# Patient Record
Sex: FEMALE | Race: BLACK OR AFRICAN AMERICAN | Employment: UNEMPLOYED | URBAN - METROPOLITAN AREA
[De-identification: names, ages, dates, MRNs, and addresses within clinical notes are randomized per-mention and may not be internally consistent; named-entity substitution may affect disease eponyms.]

---

## 2017-06-13 ENCOUNTER — HOSPITAL ENCOUNTER (OUTPATIENT)
Dept: MRI IMAGING | Age: 47
Discharge: HOME | End: 2017-06-13
Attending: FAMILY MEDICINE
Payer: MEDICAID

## 2017-06-13 DIAGNOSIS — M79.602 PAIN OF LEFT UPPER EXTREMITY: ICD-10-CM

## 2017-06-13 DIAGNOSIS — M54.2 NECK PAIN: ICD-10-CM

## 2017-06-13 DIAGNOSIS — R20.0 LEFT UPPER EXTREMITY NUMBNESS: ICD-10-CM

## 2017-06-13 PROCEDURE — 72141 MRI NECK SPINE W/O DYE: CPT

## 2017-06-29 PROBLEM — M50.30 DDD (DEGENERATIVE DISC DISEASE), CERVICAL: Status: ACTIVE | Noted: 2017-06-29

## 2017-06-29 PROBLEM — M54.12 CERVICAL RADICULOPATHY: Status: ACTIVE | Noted: 2017-06-29

## 2017-07-17 ENCOUNTER — HOSPITAL ENCOUNTER (OUTPATIENT)
Dept: PHYSICAL THERAPY | Age: 47
Discharge: HOME | End: 2017-07-17
Payer: MEDICAID

## 2017-07-17 PROCEDURE — 97161 PT EVAL LOW COMPLEX 20 MIN: CPT

## 2017-07-17 PROCEDURE — 97110 THERAPEUTIC EXERCISES: CPT

## 2017-07-17 NOTE — PROGRESS NOTES
Katerina Zarate  : 1970 [facility] at [address]  [phone]   [phone]       OUTPATIENT PHYSICAL THERAPY:Initial Assessment and Daily Note 2017    ICD-10: Treatment Diagnosis: Cervicalgia (M54.2)   Precautions/Allergies:   Review of patient's allergies indicates no known allergies. Fall Risk Score: 0 (? 5 = High Risk)  MD Orders: Eval and Treat  MEDICAL/REFERRING DIAGNOSIS:  Other cervical disc degeneration, unspecified cervical region [M50.30]  Radiculopathy, cervical region [M54.12]   DATE OF ONSET: May 10, 2017  REFERRING PHYSICIAN: Avelino Jason MD  RETURN PHYSICIAN APPOINTMENT: TBD by patient      INITIAL ASSESSMENT:  Ms. Katerina Zarate has attended 1 physical therapy session including initial evaluation. Katerina Zarate presents with S/S of increased pain, decreased ROM, decreased strength, decreased functional tolerance consistent with S/S of median nerve radiculopathy. Katerina Zarate will benefit from home exercise program, therapeutic and postural strengthening exercises, manual therapeutic techniques (ie. Distraction, SOR, myofascial release/soft tissue mobilization) as appropriate to address [address] Prymer's current condition. Katerina Zarate will benefit from skilled PT (medically necessary) to address above deficits affecting participation in basic ADLs and overall functional tolerance. PROBLEM LIST (Impacting functional limitations):  1. Decreased Strength  2. Decreased ADL/Functional Activities  3. Decreased Transfer Abilities  4. Decreased Ambulation Ability/Technique  5. Decreased Balance  6. Increased Pain  7. Decreased Work Simplification/Energy Conservation Techniques  8. Decreased Flexibility/Joint Mobility  9. Decreased [facility] with Home Exercise Program INTERVENTIONS PLANNED:  1. Balance Exercise  2. Bed Mobility  3. Cold  4. Cryotherapy  5. Heat  6. Home Exercise Program (HEP)  7.  Manual Therapy  8. Neuromuscular Re-education/Strengthening  9. Range of Motion (ROM)  10. Therapeutic Activites  11. Therapeutic Exercise/Strengthening  12. Transfer Training   TREATMENT PLAN:  Effective Dates: 7/17/2017 TO 10/17/2017. Frequency/Duration: 2 times a week for 8 weeks  GOALS: (Goals have been discussed and agreed upon with patient.)  SHORT-TERM FUNCTIONAL GOALS: Time Frame: 4 weeks  1. Natha Ervin will report <=5/10 pain to cervical spine with performance of functional spinal mobility and rotation. 2.  Natha Ervin will demonstrate improved NDI score, indicating spinal improvement from 35/50 to 25/50 affecting minimal to no difficulty with performance of cervical mobility and strengthening. 3.  Gail Prymer to be independent with initial HEP for cervical region and UE's.   4.  Gail Prymer will improve ROM to >=90% to assist with improved function during instrumental activities of daily living. 5.  Natha Ervin will improve MMT to >=4+/5 to all UE strengthening to return to PLOF and improve functional tolerance. DISCHARGE GOALS: Time Frame: 8 weeks  1. Natha Ervin will report <=2/10 pain to cervical spine with performance of functional spinal mobility and rotation and minimal to no difficulty with such tasks. 2. Natha Ervin will demonstrate improved NDI score, indicating spinal improvement from 25/50 to 15/50 affecting minimal to no difficulty with performance of cervical mobility and strengthening. 3. Gail Prymer to be independent with advanced HEP for cervical region and UE's. Rehabilitation Potential For Stated Goals: Good  Regarding Gail Johnson's therapy, I certify that the treatment plan above will be carried out by a therapist or under their direction.   Thank you for this referral,  Lottie Tran PT     Referring Physician Signature: Jessika Frank MD              Date                    HISTORY:   History of Present Injury/Illness (Reason for Referral):  Ms. Murali Anton reports onset for left sided neck and arm pain that has disturbed sleep, bathing, dressing, job duties, housework, cooking, eating, sitting, and driving. She reports that her symptoms are occasional sharp, throbbing, twinge, ache, numbness (in the middle three fingers on left hand), tingling (occasional), tight, pulling. Past Medical History/Comorbidities:   Ms. Ventura Osgood  has a past medical history of Anxiety; Arthritis; Chronic obstructive pulmonary disease ([facility] [address] Chronic pain; Contact dermatitis and other eczema, due to unspecified cause; Diabetes ([facility] [place] [address] Hypercholesterolemia; Hypertension; and Trauma. Ms. Ventura Osgood  has a past surgical history that includes  section and myomectomy. Social History/Living Environment:     lives alone in an apartment  Prior Level of Function/Work/Activity:    Personal Factors:          Social Background:  Recently moved from another part of the [place]    Current Medications:    Current Outpatient Prescriptions:     aspirin delayed-release 81 mg tablet, Take  by mouth daily. , Disp: , Rfl:     cephALEXin (KEFLEX) 500 mg capsule, Take 500 mg by mouth four (4) times daily. , Disp: , Rfl:     atenolol (TENORMIN) 25 mg tablet, Take 25 mg by mouth daily. , Disp: , Rfl:     gabapentin (NEURONTIN) 100 mg capsule, One capsule three times a day for nerve pain, Disp: 90 Cap, Rfl: 1    doxepin (SINEQUAN) 10 mg capsule, Take 1 Cap by mouth nightly., Disp: 30 Cap, Rfl: 0    simvastatin (ZOCOR) 20 mg tablet, Take 1 Tab by mouth nightly., Disp: 90 Tab, Rfl: 3    meloxicam (MOBIC) 15 mg tablet, Take 1 Tab by mouth daily. , Disp: 30 Tab, Rfl: 6     Date Last Reviewed:  2017   Number of Personal Factors/Comorbidities that affect the Plan of Care: 1-2: MODERATE COMPLEXITY   EXAMINATION:   Observation/Orthostatic Postural Assessment: Forward head posture, guarded left arm position, increased thoracic kyphosis.   Increased mid cervical spine motion with mid-cervical hinging. MRI- left C6-7 disc bulge  Palpation:          Tenderness and decreased soft tissue mobility in bilateral upper trapezius, levator, and cervical paraspinals. Limited thoracic spine mobility, increased thoracic spine paraspinal mobility with tenderness throughout. ROM:    Joint: Eval Date: 7/17/2017 Re-Assess Date: Re-Assess Date:         Cervical AROM      Flexion (% of normal): 75     Extension (% of normal): 50     L sidebending (% of normal): 100     R sidebending (% of normal): 75     L rotation (% of normal): 100     R rotation (% of normal): 75                 Pain at end-range or with AROM? Yes/No Yes with right sidebending and rotation     Any pain with overpressure? Yes/No Yes on right spurlings       Strength:    Joint: Eval Date:  Re-Assess Date:  Re-Assess Date:     RIGHT LEFT RIGHT LEFT RIGHT LEFT   Shoulder flexion:  5/5 5/5       Shoulder extension: 5/5 5/5       Shoulder abduction: 5/5 5/5       Shoulder IR: 5/5 5/5       Shoulder ER: 5/5 5/5       Elbow flexion: 5/5 5/5       Elbow extension: 5/5 5/5       Wrist flexion/extension: 5/5 5/5           Special Tests: Assessed @ Initial Visit    Spurling's Test: Positive on right with left pain     Neurological Screen: Radiating symptoms? Yes on left  +left median nerve testing. Functional Mobility:  Assessed @ Initial Visit:  Impaired movement pattern supine-sit     Body Structures Involved:  1. Nerves  2. Thoracic Cage  3. Bones  4. Joints  5. Muscles  6. Ligaments Body Functions Affected:  1. Sensory/Pain  2. Neuromusculoskeletal  3. Movement Related Activities and Participation Affected:  1. General Tasks and Demands  2. Mobility  3. Self Care  4. Community, Social and [place]   Number of elements that affect the Plan of Care: 3: MODERATE COMPLEXITY   CLINICAL PRESENTATION:   Presentation: Stable and uncomplicated: LOW COMPLEXITY   CLINICAL DECISION MAKING:   Outcome Measure:    Tool Used: Neck Disability Index (NDI)  Score: Initial: 35/50  Most Recent: X/50 (Date: -- )   Interpretation of Score: The Neck Disability Index is a revised form of the Oswestry Low Back Pain Index and is designed to measure the activities of daily living in person's with neck pain. Each section is scored on a 0-5 scale, 5 representing the greatest disability. The scores of each section are added together for a total score of 50. Score 0 1-10 11-20 21-30 31-40 41-49 50   Modifier CH CI CJ CK CL CM CN       Medical Necessity:   · Skilled intervention continues to be required due to address above deficits affecting participation in basic ADLs and overall functional tolerance. Reason for Services/Other Comments:  · Patient continues to require skilled intervention due to address above deficits affecting participation in basic ADLs and overall functional tolerance. Use of outcome tool(s) and clinical judgement create a POC that gives a: Questionable prediction of patient's progress: MODERATE COMPLEXITY   TREATMENT:   (In addition to Assessment/Re-Assessment sessions the following treatments were rendered)  THERAPEUTIC EXERCISE: (15 minutes):  Exercises per grid below to improve mobility, strength, balance and coordination. Required minimal visual and verbal cues to promote proper body alignment and promote proper body posture. Progressed resistance, range, repetitions and complexity of movement as indicated. Date:  7/17/2017 Date:   Date:     Activity/Exercise Parameters Parameters Parameters   Sidelying trunk rotation 2x10     Deep cervcial chin tucks 10x5\"     Doorway pec stretch 3x30\"     Median nerve glides 2x20                             MANUAL THERAPY: (0 minutes): Joint mobilization, Soft tissue mobilization and Manipulation was utilized and necessary because of the patient's restricted joint motion, painful spasm and loss of articular motion.    MODALITIES: (0 minutes):    (Used abbreviations: MET - muscle energy technique; PNF - proprioceptive neuromuscular facilitation; NMR - neuromuscular re-education; AP - anterior to posterior; PA - posterior to anterior)    Pre-Treatment Assessment: see patient history  Treatment/Session Assessment: Patient had improved ROM   · Pain/ Symptoms: Initial:   10 Post Session:  7 ·   Compliance with Program/Exercises: Will assess as treatment progresses. · Recommendations/Intent for next treatment session: \"Next visit will focus on advancements to more challenging activities and reduction in assistance provided\".   Total Treatment Duration:  PT Patient Time In/Time Out  Time In: 1015  Time Out: [address],

## 2017-07-19 NOTE — PROGRESS NOTES
Ambulatory/Rehab Services H2 Model Falls Risk Assessment    Risk Factor Pts. ·   Confusion/Disorientation/Impulsivity  []    4 ·   Symptomatic Depression  []   2 ·   Altered Elimination  []   1 ·   Dizziness/Vertigo  []   1 ·   Gender (Male)  []   1 ·   Any administered antiepileptics (anticonvulsants):  []   2 ·   Any administered benzodiazepines:  []   1 ·   Visual Impairment (specify):  []   1 ·   Portable Oxygen Use  []   1 ·   Orthostatic ? BP  []   1 ·   History of Recent Falls (within 3 mos.)  []   5     Ability to Rise from Chair (choose one) Pts. ·   Ability to rise in a single movement  [x]   0 ·   Pushes up, successful in one attempt  []   1 ·   Multiple attempts, but successful  []   3 ·   Unable to rise without assistance  []   4   Total: (5 or greater = High Risk) 0     Falls Prevention Plan:   []                Physical Limitations to Exercise (specify):   []                Mobility Assistance Device (type):   []                Exercise/Equipment Adaptation (specify):    ©2010 [facility] Patent #[id number].  Federal Law prohibits the replication, distribution or use without written permission from [facility] [employer]

## 2017-07-21 ENCOUNTER — APPOINTMENT (OUTPATIENT)
Dept: PHYSICAL THERAPY | Age: 47
End: 2017-07-21
Payer: MEDICAID

## 2017-07-24 ENCOUNTER — HOSPITAL ENCOUNTER (OUTPATIENT)
Dept: PHYSICAL THERAPY | Age: 47
Discharge: HOME | End: 2017-07-24
Payer: MEDICAID

## 2017-07-24 PROCEDURE — 97110 THERAPEUTIC EXERCISES: CPT

## 2017-07-24 PROCEDURE — 97140 MANUAL THERAPY 1/> REGIONS: CPT

## 2017-07-24 NOTE — PROGRESS NOTES
Grayson Nuon  : 1970 [facility] at [address]  [phone]   [phone]       OUTPATIENT PHYSICAL THERAPY: Daily Note 2017    ICD-10: Treatment Diagnosis: Cervicalgia (M54.2)   Precautions/Allergies:   Review of patient's allergies indicates no known allergies. Fall Risk Score: 0 (? 5 = High Risk)  MD Orders: Eval and Treat  MEDICAL/REFERRING DIAGNOSIS:  Other cervical disc degeneration, unspecified cervical region [M50.30]  Radiculopathy, cervical region [M54.12]   DATE OF ONSET: May 10, 2017  REFERRING PHYSICIAN: Noe Gay MD  RETURN PHYSICIAN APPOINTMENT: TBD by patient      INITIAL ASSESSMENT:  Ms. Grayson Nuno has attended 1 physical therapy session including initial evaluation. Grayson Nuno presents with S/S of increased pain, decreased ROM, decreased strength, decreased functional tolerance consistent with S/S of median nerve radiculopathy. Grayson Nuno will benefit from home exercise program, therapeutic and postural strengthening exercises, manual therapeutic techniques (ie. Distraction, SOR, myofascial release/soft tissue mobilization) as appropriate to address Vee Ornelas's current condition. Grayson Nuno will benefit from skilled PT (medically necessary) to address above deficits affecting participation in basic ADLs and overall functional tolerance. PROBLEM LIST (Impacting functional limitations):  1. Decreased Strength  2. Decreased ADL/Functional Activities  3. Decreased Transfer Abilities  4. Decreased Ambulation Ability/Technique  5. Decreased Balance  6. Increased Pain  7. Decreased Work Simplification/Energy Conservation Techniques  8. Decreased Flexibility/Joint Mobility  9. Decreased [place] with Home Exercise Program INTERVENTIONS PLANNED:  1. Balance Exercise  2. Bed Mobility  3. Cold  4. Cryotherapy  5. Heat  6. Home Exercise Program (HEP)  7. Manual Therapy  8.  Neuromuscular Re-education/Strengthening  9. Range of Motion (ROM)  10. Therapeutic Activites  11. Therapeutic Exercise/Strengthening  12. Transfer Training   TREATMENT PLAN:  Effective Dates: 7/17/2017 TO 10/17/2017. Frequency/Duration: 2 times a week for 8 weeks  GOALS: (Goals have been discussed and agreed upon with patient.)  SHORT-TERM FUNCTIONAL GOALS: Time Frame: 4 weeks  1. Lior Rodriguez will report <=5/10 pain to cervical spine with performance of functional spinal mobility and rotation. 2.  Lior Rodriguez will demonstrate improved NDI score, indicating spinal improvement from 35/50 to 25/50 affecting minimal to no difficulty with performance of cervical mobility and strengthening. 3.  Gail Prymer to be independent with initial HEP for cervical region and UE's.   4.  Gail Prymer will improve ROM to >=90% to assist with improved function during instrumental activities of daily living. 5.  Liro Rodriguez will improve MMT to >=4+/5 to all UE strengthening to return to PLOF and improve functional tolerance. DISCHARGE GOALS: Time Frame: 8 weeks  1. Lior Rodriguez will report <=2/10 pain to cervical spine with performance of functional spinal mobility and rotation and minimal to no difficulty with such tasks. 2. Lior Rodriguez will demonstrate improved NDI score, indicating spinal improvement from 25/50 to 15/50 affecting minimal to no difficulty with performance of cervical mobility and strengthening. 3. Gail Prymer to be independent with advanced HEP for cervical region and UE's. Rehabilitation Potential For Stated Goals: Good  Regarding Gailamador Chavezer's therapy, I certify that the treatment plan above will be carried out by a therapist or under their direction.   Thank you for this referral,  Frank Clarke PT     Referring Physician Signature: Diallo Ghosh MD              Date                    HISTORY:   History of Present Injury/Illness (Reason for Referral):  Ms. Jb Terry reports onset for left sided neck and arm pain that has disturbed sleep, bathing, dressing, job duties, housework, cooking, eating, sitting, and driving. She reports that her symptoms are occasional sharp, throbbing, twinge, ache, numbness (in the middle three fingers on left hand), tingling (occasional), tight, pulling. Past Medical History/Comorbidities:   Ms. Justin Dunn  has a past medical history of Anxiety; Arthritis; Chronic obstructive pulmonary disease ([address] Chronic pain; Contact dermatitis and other eczema, due to unspecified cause; Diabetes ([address] Hypercholesterolemia; Hypertension; and Trauma. Ms. Justin Dunn  has a past surgical history that includes  section and myomectomy. Social History/Living Environment:     lives alone in an apartment  Prior Level of Function/Work/Activity:    Personal Factors:          Social Background:  Recently moved from another part of the [place]    Current Medications:    Current Outpatient Prescriptions:     aspirin delayed-release 81 mg tablet, Take  by mouth daily. , Disp: , Rfl:     cephALEXin (KEFLEX) 500 mg capsule, Take 500 mg by mouth four (4) times daily. , Disp: , Rfl:     atenolol (TENORMIN) 25 mg tablet, Take 25 mg by mouth daily. , Disp: , Rfl:     gabapentin (NEURONTIN) 100 mg capsule, One capsule three times a day for nerve pain, Disp: 90 Cap, Rfl: 1    doxepin (SINEQUAN) 10 mg capsule, Take 1 Cap by mouth nightly., Disp: 30 Cap, Rfl: 0    simvastatin (ZOCOR) 20 mg tablet, Take 1 Tab by mouth nightly., Disp: 90 Tab, Rfl: 3    meloxicam (MOBIC) 15 mg tablet, Take 1 Tab by mouth daily. , Disp: 30 Tab, Rfl: 6     Date Last Reviewed:  2017   Number of Personal Factors/Comorbidities that affect the Plan of Care: 1-2: MODERATE COMPLEXITY   EXAMINATION:   Observation/Orthostatic Postural Assessment: Forward head posture, guarded left arm position, increased thoracic kyphosis. Increased mid cervical spine motion with mid-cervical hinging.   MRI- left C6-7 disc bulge  Palpation:          Tenderness and decreased soft tissue mobility in bilateral upper trapezius, levator, and cervical paraspinals. Limited thoracic spine mobility, increased thoracic spine paraspinal mobility with tenderness throughout. ROM:    Joint: Eval Date: 7/17/2017 Re-Assess Date: Re-Assess Date:         Cervical AROM      Flexion (% of normal): 75     Extension (% of normal): 50     L sidebending (% of normal): 100     R sidebending (% of normal): 75     L rotation (% of normal): 100     R rotation (% of normal): 75                 Pain at end-range or with AROM? Yes/No Yes with right sidebending and rotation     Any pain with overpressure? Yes/No Yes on right spurlings       Strength:    Joint: Eval Date:  Re-Assess Date:  Re-Assess Date:     RIGHT LEFT RIGHT LEFT RIGHT LEFT   Shoulder flexion:  5/5 5/5       Shoulder extension: 5/5 5/5       Shoulder abduction: 5/5 5/5       Shoulder IR: 5/5 5/5       Shoulder ER: 5/5 5/5       Elbow flexion: 5/5 5/5       Elbow extension: 5/5 5/5       Wrist flexion/extension: 5/5 5/5           Special Tests: Assessed @ Initial Visit    Spurling's Test: Positive on right with left pain     Neurological Screen: Radiating symptoms? Yes on left  +left median nerve testing. Functional Mobility:  Assessed @ Initial Visit:  Impaired movement pattern supine-sit     Body Structures Involved:  1. Nerves  2. Thoracic Cage  3. Bones  4. Joints  5. Muscles  6. Ligaments Body Functions Affected:  1. Sensory/Pain  2. Neuromusculoskeletal  3. Movement Related Activities and Participation Affected:  1. General Tasks and Demands  2. Mobility  3. Self Care  4. Community, Social and [place]   Number of elements that affect the Plan of Care: 3: MODERATE COMPLEXITY   CLINICAL PRESENTATION:   Presentation: Stable and uncomplicated: LOW COMPLEXITY   CLINICAL DECISION MAKING:   Outcome Measure:    Tool Used: Neck Disability Index (NDI)  Score:  Initial: 35/50  Most Recent: X/50 (Date: -- )   Interpretation of Score: The Neck Disability Index is a revised form of the Oswestry Low Back Pain Index and is designed to measure the activities of daily living in person's with neck pain. Each section is scored on a 0-5 scale, 5 representing the greatest disability. The scores of each section are added together for a total score of 50. Score 0 1-10 11-20 21-30 31-40 41-49 50   Modifier CH CI CJ CK CL CM CN       Medical Necessity:   · Skilled intervention continues to be required due to address above deficits affecting participation in basic ADLs and overall functional tolerance. Reason for Services/Other Comments:  · Patient continues to require skilled intervention due to address above deficits affecting participation in basic ADLs and overall functional tolerance. Use of outcome tool(s) and clinical judgement create a POC that gives a: Questionable prediction of patient's progress: MODERATE COMPLEXITY   TREATMENT:   (In addition to Assessment/Re-Assessment sessions the following treatments were rendered)  THERAPEUTIC EXERCISE: (25 minutes):  Exercises per grid below to improve mobility, strength, balance and coordination. Required minimal visual and verbal cues to promote proper body alignment and promote proper body posture. Progressed resistance, range, repetitions and complexity of movement as indicated. Date:  7/17/2017 Date:  7/24/2017 Date:     Activity/Exercise Parameters Parameters Parameters   Sidelying trunk rotation 2x10 2x10    Deep cervcial chin tucks 10x5\" 10x5\"    Doorway pec stretch 3x30\" 3x30\"    Median nerve glides 2x20 2x20    Nu-step  x10'                      MANUAL THERAPY: (15 minutes): Joint mobilization, Soft tissue mobilization and Manipulation was utilized and necessary because of the patient's restricted joint motion, painful spasm and loss of articular motion.   Instrument assisted soft tissue mobilization to bilateral upper trap and cervical paraspinals. Manual cervical traction. MODALITIES: (0 minutes):    (Used abbreviations: MET - muscle energy technique; PNF - proprioceptive neuromuscular facilitation; NMR - neuromuscular re-education; AP - anterior to posterior; PA - posterior to anterior)    Pre-Treatment Assessment: Reports feeling better with H/EP  Treatment/Session Assessment: Patient had improved ROM and pain after treatment. · Pain/ Symptoms: Initial:   8 Post Session:  5 ·   Compliance with Program/Exercises: Will assess as treatment progresses. · Recommendations/Intent for next treatment session: \"Next visit will focus on advancements to more challenging activities and reduction in assistance provided\".   Total Treatment Duration:  PT Patient Time In/Time Out  Time In: 1400  Time Out: 87 Ariana Ledezma, PT

## 2017-07-26 ENCOUNTER — HOSPITAL ENCOUNTER (OUTPATIENT)
Dept: PHYSICAL THERAPY | Age: 47
Discharge: HOME | End: 2017-07-26
Payer: MEDICAID

## 2017-07-26 PROCEDURE — 97012 MECHANICAL TRACTION THERAPY: CPT

## 2017-07-26 PROCEDURE — 97110 THERAPEUTIC EXERCISES: CPT

## 2017-07-26 NOTE — PROGRESS NOTES
Davy Wu  : 1970 [facility] [address]  [phone]   [phone]       OUTPATIENT PHYSICAL THERAPY: Daily Note 2017    ICD-10: Treatment Diagnosis: Cervicalgia (M54.2)   Precautions/Allergies:   Review of patient's allergies indicates no known allergies. Fall Risk Score: 0 (? 5 = High Risk)  MD Orders: Eval and Treat  MEDICAL/REFERRING DIAGNOSIS:  Other cervical disc degeneration, unspecified cervical region [M50.30]  Radiculopathy, cervical region [M54.12]   DATE OF ONSET: May 10, 2017  REFERRING PHYSICIAN: Humberto Sol MD  RETURN PHYSICIAN APPOINTMENT: TBD by patient      INITIAL ASSESSMENT:  Ms. Davy Wu has attended 1 physical therapy session including initial evaluation. Davy Wu presents with S/S of increased pain, decreased ROM, decreased strength, decreased functional tolerance consistent with S/S of median nerve radiculopathy. Davy Wu will benefit from home exercise program, therapeutic and postural strengthening exercises, manual therapeutic techniques (ie. Distraction, SOR, myofascial release/soft tissue mobilization) as appropriate to address Lebron Ornelas's current condition. Davy Wu will benefit from skilled PT (medically necessary) to address above deficits affecting participation in basic ADLs and overall functional tolerance. PROBLEM LIST (Impacting functional limitations):  1. Decreased Strength  2. Decreased ADL/Functional Activities  3. Decreased Transfer Abilities  4. Decreased Ambulation Ability/Technique  5. Decreased Balance  6. Increased Pain  7. Decreased Work Simplification/Energy Conservation Techniques  8. Decreased Flexibility/Joint Mobility  9. Decreased [place] with Home Exercise Program INTERVENTIONS PLANNED:  1. Balance Exercise  2. Bed Mobility  3. Cold  4. Cryotherapy  5. Heat  6. Home Exercise Program (HEP)  7. Manual Therapy  8.  Neuromuscular Re-education/Strengthening  9. Range of Motion (ROM)  10. Therapeutic Activites  11. Therapeutic Exercise/Strengthening  12. Transfer Training   TREATMENT PLAN:  Effective Dates: 7/17/2017 TO 10/17/2017. Frequency/Duration: 2 times a week for 8 weeks  GOALS: (Goals have been discussed and agreed upon with patient.)  SHORT-TERM FUNCTIONAL GOALS: Time Frame: 4 weeks  1. Eufemia Stout will report <=5/10 pain to cervical spine with performance of functional spinal mobility and rotation. 2.  Eufemia Stout will demonstrate improved NDI score, indicating spinal improvement from 35/50 to 25/50 affecting minimal to no difficulty with performance of cervical mobility and strengthening. 3.  Gail Prymer to be independent with initial HEP for cervical region and UE's.   4.  Gail Prymer will improve ROM to >=90% to assist with improved function during instrumental activities of daily living. 5.  Eufemia Stout will improve MMT to >=4+/5 to all UE strengthening to return to PLOF and improve functional tolerance. DISCHARGE GOALS: Time Frame: 8 weeks  1. Eufemia Stout will report <=2/10 pain to cervical spine with performance of functional spinal mobility and rotation and minimal to no difficulty with such tasks. 2. Eufemia Stout will demonstrate improved NDI score, indicating spinal improvement from 25/50 to 15/50 affecting minimal to no difficulty with performance of cervical mobility and strengthening. 3. Gail Prymer to be independent with advanced HEP for cervical region and UE's. Rehabilitation Potential For Stated Goals: Good  Regarding Gailamador Ornelsa's therapy, I certify that the treatment plan above will be carried out by a therapist or under their direction.   Thank you for this referral,  Rebecca Gunderson PT     Referring Physician Signature: Dmitry Catalan MD              Date                    HISTORY:   History of Present Injury/Illness (Reason for Referral):  Ms. Lisa Marin reports onset for left sided neck and arm pain that has disturbed sleep, bathing, dressing, job duties, housework, cooking, eating, sitting, and driving. She reports that her symptoms are occasional sharp, throbbing, twinge, ache, numbness (in the middle three fingers on left hand), tingling (occasional), tight, pulling. Past Medical History/Comorbidities:   Ms. Valeria Mayo  has a past medical history of Anxiety; Arthritis; Chronic obstructive pulmonary disease ([address] Chronic pain; Contact dermatitis and other eczema, due to unspecified cause; Diabetes ([address] Hypercholesterolemia; Hypertension; and Trauma. Ms. Valeria Mayo  has a past surgical history that includes  section and myomectomy. Social History/Living Environment:     lives alone in an apartment  Prior Level of Function/Work/Activity:    Personal Factors:          Social Background:  Recently moved from another part of the [place]    Current Medications:    Current Outpatient Prescriptions:     aspirin delayed-release 81 mg tablet, Take  by mouth daily. , Disp: , Rfl:     cephALEXin (KEFLEX) 500 mg capsule, Take 500 mg by mouth four (4) times daily. , Disp: , Rfl:     atenolol (TENORMIN) 25 mg tablet, Take 25 mg by mouth daily. , Disp: , Rfl:     gabapentin (NEURONTIN) 100 mg capsule, One capsule three times a day for nerve pain, Disp: 90 Cap, Rfl: 1    doxepin (SINEQUAN) 10 mg capsule, Take 1 Cap by mouth nightly., Disp: 30 Cap, Rfl: 0    simvastatin (ZOCOR) 20 mg tablet, Take 1 Tab by mouth nightly., Disp: 90 Tab, Rfl: 3    meloxicam (MOBIC) 15 mg tablet, Take 1 Tab by mouth daily. , Disp: 30 Tab, Rfl: 6     Date Last Reviewed:  2017   Number of Personal Factors/Comorbidities that affect the Plan of Care: 1-2: MODERATE COMPLEXITY   EXAMINATION:   Observation/Orthostatic Postural Assessment: Forward head posture, guarded left arm position, increased thoracic kyphosis. Increased mid cervical spine motion with mid-cervical hinging.   MRI- left C6-7 disc bulge  Palpation:          Tenderness and decreased soft tissue mobility in bilateral upper trapezius, levator, and cervical paraspinals. Limited thoracic spine mobility, increased thoracic spine paraspinal mobility with tenderness throughout. ROM:    Joint: Eval Date: 7/17/2017 Re-Assess Date: Re-Assess Date:         Cervical AROM      Flexion (% of normal): 75     Extension (% of normal): 50     L sidebending (% of normal): 100     R sidebending (% of normal): 75     L rotation (% of normal): 100     R rotation (% of normal): 75                 Pain at end-range or with AROM? Yes/No Yes with right sidebending and rotation     Any pain with overpressure? Yes/No Yes on right spurlings       Strength:    Joint: Eval Date:  Re-Assess Date:  Re-Assess Date:     RIGHT LEFT RIGHT LEFT RIGHT LEFT   Shoulder flexion:  5/5 5/5       Shoulder extension: 5/5 5/5       Shoulder abduction: 5/5 5/5       Shoulder IR: 5/5 5/5       Shoulder ER: 5/5 5/5       Elbow flexion: 5/5 5/5       Elbow extension: 5/5 5/5       Wrist flexion/extension: 5/5 5/5           Special Tests: Assessed @ Initial Visit    Spurling's Test: Positive on right with left pain     Neurological Screen: Radiating symptoms? Yes on left  +left median nerve testing. Functional Mobility:  Assessed @ Initial Visit:  Impaired movement pattern supine-sit     Body Structures Involved:  1. Nerves  2. Thoracic Cage  3. Bones  4. Joints  5. Muscles  6. Ligaments Body Functions Affected:  1. Sensory/Pain  2. Neuromusculoskeletal  3. Movement Related Activities and Participation Affected:  1. General Tasks and Demands  2. Mobility  3. Self Care  4. Community, Social and [place]   Number of elements that affect the Plan of Care: 3: MODERATE COMPLEXITY   CLINICAL PRESENTATION:   Presentation: Stable and uncomplicated: LOW COMPLEXITY   CLINICAL DECISION MAKING:   Outcome Measure:    Tool Used: Neck Disability Index (NDI)  Score:  Initial: 35/50  Most Recent: X/50 (Date: -- )   Interpretation of Score: The Neck Disability Index is a revised form of the Oswestry Low Back Pain Index and is designed to measure the activities of daily living in person's with neck pain. Each section is scored on a 0-5 scale, 5 representing the greatest disability. The scores of each section are added together for a total score of 50. Score 0 1-10 11-20 21-30 31-40 41-49 50   Modifier CH CI CJ CK CL CM CN       Medical Necessity:   · Skilled intervention continues to be required due to address above deficits affecting participation in basic ADLs and overall functional tolerance. Reason for Services/Other Comments:  · Patient continues to require skilled intervention due to address above deficits affecting participation in basic ADLs and overall functional tolerance. Use of outcome tool(s) and clinical judgement create a POC that gives a: Questionable prediction of patient's progress: MODERATE COMPLEXITY   TREATMENT:   (In addition to Assessment/Re-Assessment sessions the following treatments were rendered)  THERAPEUTIC EXERCISE: (25 minutes):  Exercises per grid below to improve mobility, strength, balance and coordination. Required minimal visual and verbal cues to promote proper body alignment and promote proper body posture. Progressed resistance, range, repetitions and complexity of movement as indicated.      Date:  7/17/2017 Date:  7/24/2017 Date:  7/26/2017   Activity/Exercise Parameters Parameters Parameters   Sidelying trunk rotation 2x10 2x10    Deep cervcial chin tucks 10x5\" 10x5\"    Doorway pec stretch 3x30\" 3x30\" 3x30\"   Median nerve glides 2x20 2x20    Nu-step  x10' x10'   Cervical joint position training   With multi direction control with visual bio-feedback x10'   TB B ER      TB Rows            MANUAL THERAPY: (0 minutes): Joint mobilization, Soft tissue mobilization and Manipulation was utilized and necessary because of the patient's restricted joint motion, painful spasm and loss of articular motion. Instrument assisted soft tissue mobilization to bilateral upper trap and cervical paraspinals. Manual cervical traction. MODALITIES: (15 minutes):  Mechanical cervical traction in supine hooklyin# max tension, 8# least tension. No adverse reaction with treatment. (Used abbreviations: MET - muscle energy technique; PNF - proprioceptive neuromuscular facilitation; NMR - neuromuscular re-education; AP - anterior to posterior; PA - posterior to anterior)    Pre-Treatment Assessment: Reports feeling better with H/EP, but continued tingling. Treatment/Session Assessment: Patient had improved ROM and pain after treatment. Improved tingling after cervical traction. · Pain/ Symptoms: Initial:   5 Post Session:  5 ·   Compliance with Program/Exercises: Will assess as treatment progresses. · Recommendations/Intent for next treatment session: \"Next visit will focus on advancements to more challenging activities and reduction in assistance provided\".   Total Treatment Duration:  PT Patient Time In/Time Out  Time In: 1300  Time Out: [place], PT

## 2017-08-01 ENCOUNTER — HOSPITAL ENCOUNTER (OUTPATIENT)
Dept: PHYSICAL THERAPY | Age: 47
Discharge: HOME | End: 2017-08-01
Payer: MEDICAID

## 2017-08-01 PROCEDURE — 97012 MECHANICAL TRACTION THERAPY: CPT

## 2017-08-01 PROCEDURE — 97110 THERAPEUTIC EXERCISES: CPT

## 2017-08-01 PROCEDURE — 97140 MANUAL THERAPY 1/> REGIONS: CPT

## 2017-08-01 NOTE — PROGRESS NOTES
Bubba Ornelas  : 1970 [facility] [address]  [phone]   [phone]       OUTPATIENT PHYSICAL THERAPY: Daily Note 2017    ICD-10: Treatment Diagnosis: Cervicalgia (M54.2)   Precautions/Allergies:   Review of patient's allergies indicates no known allergies. Fall Risk Score: 0 (? 5 = High Risk)  MD Orders: Eval and Treat  MEDICAL/REFERRING DIAGNOSIS:  Other cervical disc degeneration, unspecified cervical region [M50.30]  Radiculopathy, cervical region [M54.12]   DATE OF ONSET: May 10, 2017  REFERRING PHYSICIAN: Konstantin Doyle MD  RETURN PHYSICIAN APPOINTMENT: TBD by patient      INITIAL ASSESSMENT:  Ms. Lorie Nielsen has attended 1 physical therapy session including initial evaluation. Lorie Nielsen presents with S/S of increased pain, decreased ROM, decreased strength, decreased functional tolerance consistent with S/S of median nerve radiculopathy. Lorie Nielsen will benefit from home exercise program, therapeutic and postural strengthening exercises, manual therapeutic techniques (ie. Distraction, SOR, myofascial release/soft tissue mobilization) as appropriate to address Bubba Ornelas's current condition. Lorie Nielsen will benefit from skilled PT (medically necessary) to address above deficits affecting participation in basic ADLs and overall functional tolerance. PROBLEM LIST (Impacting functional limitations):  1. Decreased Strength  2. Decreased ADL/Functional Activities  3. Decreased Transfer Abilities  4. Decreased Ambulation Ability/Technique  5. Decreased Balance  6. Increased Pain  7. Decreased Work Simplification/Energy Conservation Techniques  8. Decreased Flexibility/Joint Mobility  9. Decreased [place] with Home Exercise Program INTERVENTIONS PLANNED:  1. Balance Exercise  2. Bed Mobility  3. Cold  4. Cryotherapy  5. Heat  6. Home Exercise Program (HEP)  7. Manual Therapy  8.  Neuromuscular Re-education/Strengthening  9. Range of Motion (ROM)  10. Therapeutic Activites  11. Therapeutic Exercise/Strengthening  12. Transfer Training   TREATMENT PLAN:  Effective Dates: 7/17/2017 TO 10/17/2017. Frequency/Duration: 2 times a week for 8 weeks  GOALS: (Goals have been discussed and agreed upon with patient.)  SHORT-TERM FUNCTIONAL GOALS: Time Frame: 4 weeks  1. Rosario Alford will report <=5/10 pain to cervical spine with performance of functional spinal mobility and rotation. 2.  Rosario Alford will demonstrate improved NDI score, indicating spinal improvement from 35/50 to 25/50 affecting minimal to no difficulty with performance of cervical mobility and strengthening. 3.  Gail Ornelas to be independent with initial HEP for cervical region and UE's.   4.  Gail Ornelas will improve ROM to >=90% to assist with improved function during instrumental activities of daily living. 5.  Rosario Alford will improve MMT to >=4+/5 to all UE strengthening to return to PLOF and improve functional tolerance. DISCHARGE GOALS: Time Frame: 8 weeks  1. Rosario Alford will report <=2/10 pain to cervical spine with performance of functional spinal mobility and rotation and minimal to no difficulty with such tasks. 2. Rosario Alford will demonstrate improved NDI score, indicating spinal improvement from 25/50 to 15/50 affecting minimal to no difficulty with performance of cervical mobility and strengthening. 3. Gail Ornelas to be independent with advanced HEP for cervical region and UE's. Rehabilitation Potential For Stated Goals: Good  Regarding Gail Chavezlaina's therapy, I certify that the treatment plan above will be carried out by a therapist or under their direction.   Thank you for this referral,  Carolin Ballard PT     Referring Physician Signature: Irving Hampton MD              Date                    HISTORY:   History of Present Injury/Illness (Reason for Referral):  Ms. Skylar Montgomery reports onset for left sided neck and arm pain that has disturbed sleep, bathing, dressing, job duties, housework, cooking, eating, sitting, and driving. She reports that her symptoms are occasional sharp, throbbing, twinge, ache, numbness (in the middle three fingers on left hand), tingling (occasional), tight, pulling. Past Medical History/Comorbidities:   Ms. Charbel Pacheco  has a past medical history of Anxiety; Arthritis; Chronic obstructive pulmonary disease ([facility] [address] Chronic pain; Contact dermatitis and other eczema, due to unspecified cause; Diabetes ([address] Hypercholesterolemia; Hypertension; and Trauma. Ms. Charbel Pacheco  has a past surgical history that includes  section and myomectomy. Social History/Living Environment:     lives alone in an apartment  Prior Level of Function/Work/Activity:    Personal Factors:          Social Background:  Recently moved from another part of the [place]    Current Medications:    Current Outpatient Prescriptions:     fluconazole (DIFLUCAN) 100 mg tablet, One a day for 5 days for yeast infection, Disp: 5 Tab, Rfl: 0    aspirin delayed-release 81 mg tablet, Take  by mouth daily. , Disp: , Rfl:     atenolol (TENORMIN) 25 mg tablet, Take 25 mg by mouth daily. , Disp: , Rfl:     gabapentin (NEURONTIN) 100 mg capsule, One capsule three times a day for nerve pain, Disp: 90 Cap, Rfl: 1    doxepin (SINEQUAN) 10 mg capsule, Take 1 Cap by mouth nightly., Disp: 30 Cap, Rfl: 0    simvastatin (ZOCOR) 20 mg tablet, Take 1 Tab by mouth nightly., Disp: 90 Tab, Rfl: 3    meloxicam (MOBIC) 15 mg tablet, Take 1 Tab by mouth daily. , Disp: 30 Tab, Rfl: 6     Date Last Reviewed:  2017   Number of Personal Factors/Comorbidities that affect the Plan of Care: 1-2: MODERATE COMPLEXITY   EXAMINATION:   Observation/Orthostatic Postural Assessment: Forward head posture, guarded left arm position, increased thoracic kyphosis. Increased mid cervical spine motion with mid-cervical hinging.   MRI- left C6-7 disc bulge  Palpation:          Tenderness and decreased soft tissue mobility in bilateral upper trapezius, levator, and cervical paraspinals. Limited thoracic spine mobility, increased thoracic spine paraspinal mobility with tenderness throughout. ROM:    Joint: Eval Date: 7/17/2017 Re-Assess Date: Re-Assess Date:         Cervical AROM      Flexion (% of normal): 75     Extension (% of normal): 50     L sidebending (% of normal): 100     R sidebending (% of normal): 75     L rotation (% of normal): 100     R rotation (% of normal): 75                 Pain at end-range or with AROM? Yes/No Yes with right sidebending and rotation     Any pain with overpressure? Yes/No Yes on right spurlings       Strength:    Joint: Eval Date:  Re-Assess Date:  Re-Assess Date:     RIGHT LEFT RIGHT LEFT RIGHT LEFT   Shoulder flexion:  5/5 5/5       Shoulder extension: 5/5 5/5       Shoulder abduction: 5/5 5/5       Shoulder IR: 5/5 5/5       Shoulder ER: 5/5 5/5       Elbow flexion: 5/5 5/5       Elbow extension: 5/5 5/5       Wrist flexion/extension: 5/5 5/5           Special Tests: Assessed @ Initial Visit    Spurling's Test: Positive on right with left pain     Neurological Screen: Radiating symptoms? Yes on left  +left median nerve testing. Functional Mobility:  Assessed @ Initial Visit:  Impaired movement pattern supine-sit     Body Structures Involved:  1. Nerves  2. Thoracic Cage  3. Bones  4. Joints  5. Muscles  6. Ligaments Body Functions Affected:  1. Sensory/Pain  2. Neuromusculoskeletal  3. Movement Related Activities and Participation Affected:  1. General Tasks and Demands  2. Mobility  3. Self Care  4. Community, Social and [place]   Number of elements that affect the Plan of Care: 3: MODERATE COMPLEXITY   CLINICAL PRESENTATION:   Presentation: Stable and uncomplicated: LOW COMPLEXITY   CLINICAL DECISION MAKING:   Outcome Measure:    Tool Used: Neck Disability Index (NDI)  Score:  Initial: 35/50  Most Recent: X/50 (Date: -- )   Interpretation of Score: The Neck Disability Index is a revised form of the Oswestry Low Back Pain Index and is designed to measure the activities of daily living in person's with neck pain. Each section is scored on a 0-5 scale, 5 representing the greatest disability. The scores of each section are added together for a total score of 50. Score 0 1-10 11-20 21-30 31-40 41-49 50   Modifier CH CI CJ CK CL CM CN       Medical Necessity:   · Skilled intervention continues to be required due to address above deficits affecting participation in basic ADLs and overall functional tolerance. Reason for Services/Other Comments:  · Patient continues to require skilled intervention due to address above deficits affecting participation in basic ADLs and overall functional tolerance. Use of outcome tool(s) and clinical judgement create a POC that gives a: Questionable prediction of patient's progress: MODERATE COMPLEXITY   TREATMENT:   (In addition to Assessment/Re-Assessment sessions the following treatments were rendered)  THERAPEUTIC EXERCISE: (25 minutes):  Exercises per grid below to improve mobility, strength, balance and coordination. Required minimal visual and verbal cues to promote proper body alignment and promote proper body posture. Progressed resistance, range, repetitions and complexity of movement as indicated.      Date:  7/17/2017 Date:  7/24/2017 Date:  7/26/2017 Date:  8/1/2017   Activity/Exercise Parameters Parameters Parameters    Sidelying trunk rotation 2x10 2x10     Deep cervcial chin tucks 10x5\" 10x5\"     Doorway pec stretch 3x30\" 3x30\" 3x30\"    Median nerve glides 2x20 2x20     Nu-step  x10' x10' x10'   Cervical joint position training   With multi direction control with visual bio-feedback x10' With multi direction control with visual bio-feedback x10'  -JPET in horizontal plane  x5'   TB B ER       TB Rows             MANUAL THERAPY: (15 minutes): Joint mobilization, Soft tissue mobilization and Manipulation was utilized and necessary because of the patient's restricted joint motion, painful spasm and loss of articular motion. Instrument assisted soft tissue mobilization to left upper trap, levator, and cervical paraspinals with physiologic motion in muscle ROM. MODALITIES: (15 minutes):  Mechanical cervical traction in supine hooklyin# max tension, 8# least tension. No adverse reaction with treatment. (Used abbreviations: MET - muscle energy technique; PNF - proprioceptive neuromuscular facilitation; NMR - neuromuscular re-education; AP - anterior to posterior; PA - posterior to anterior)    Pre-Treatment Assessment: Reports feeling better with daily home activities, but continued tingling in the left arm intermittently  Treatment/Session Assessment: Patient had improved ROM and decreased tingling after treatment. Patient had poor JPET  Control. · Pain/ Symptoms: Initial:   5 Post Session:  3 ·   Compliance with Program/Exercises: Will assess as treatment progresses. · Recommendations/Intent for next treatment session: \"Next visit will focus on advancements to more challenging activities and reduction in assistance provided\".   Total Treatment Duration:  PT Patient Time In/Time Out  Time In: 1300  Time Out: [address], PT

## 2017-08-03 ENCOUNTER — HOSPITAL ENCOUNTER (OUTPATIENT)
Dept: PHYSICAL THERAPY | Age: 47
Discharge: HOME | End: 2017-08-03
Payer: MEDICAID

## 2017-08-03 PROCEDURE — 97140 MANUAL THERAPY 1/> REGIONS: CPT

## 2017-08-03 PROCEDURE — 97012 MECHANICAL TRACTION THERAPY: CPT

## 2017-08-03 PROCEDURE — 97110 THERAPEUTIC EXERCISES: CPT

## 2017-08-03 NOTE — PROGRESS NOTES
Marcin Grajeda  : 1970 [facility] [address]  [phone]   [id number] [id number]       OUTPATIENT PHYSICAL THERAPY: Daily Note 8/3/2017    ICD-10: Treatment Diagnosis: Cervicalgia (M54.2)   Precautions/Allergies:   Review of patient's allergies indicates no known allergies. Fall Risk Score: 0 (? 5 = High Risk)  MD Orders: Eval and Treat  MEDICAL/REFERRING DIAGNOSIS:  Other cervical disc degeneration, unspecified cervical region [M50.30]  Radiculopathy, cervical region [M54.12]   DATE OF ONSET: May 10, 2017  REFERRING PHYSICIAN: Gene Sin MD  RETURN PHYSICIAN APPOINTMENT: TBD by patient      INITIAL ASSESSMENT:  Ms. Marcin Grajeda has attended 1 physical therapy session including initial evaluation. Marcin Grajeda presents with S/S of increased pain, decreased ROM, decreased strength, decreased functional tolerance consistent with S/S of median nerve radiculopathy. Marcin Grajeda will benefit from home exercise program, therapeutic and postural strengthening exercises, manual therapeutic techniques (ie. Distraction, SOR, myofascial release/soft tissue mobilization) as appropriate to address Angeline Benedict Realdawnalaina's current condition. Marcin Grajeda will benefit from skilled PT (medically necessary) to address above deficits affecting participation in basic ADLs and overall functional tolerance. PROBLEM LIST (Impacting functional limitations):  1. Decreased Strength  2. Decreased ADL/Functional Activities  3. Decreased Transfer Abilities  4. Decreased Ambulation Ability/Technique  5. Decreased Balance  6. Increased Pain  7. Decreased Work Simplification/Energy Conservation Techniques  8. Decreased Flexibility/Joint Mobility  9. Decreased [place] with Home Exercise Program INTERVENTIONS PLANNED:  1. Balance Exercise  2. Bed Mobility  3. Cold  4. Cryotherapy  5. Heat  6. Home Exercise Program (HEP)  7. Manual Therapy  8.  Neuromuscular Re-education/Strengthening  9. Range of Motion (ROM)  10. Therapeutic Activites  11. Therapeutic Exercise/Strengthening  12. Transfer Training   TREATMENT PLAN:  Effective Dates: 7/17/2017 TO 10/17/2017. Frequency/Duration: 2 times a week for 8 weeks  GOALS: (Goals have been discussed and agreed upon with patient.)  SHORT-TERM FUNCTIONAL GOALS: Time Frame: 4 weeks  1. Ardith La will report <=5/10 pain to cervical spine with performance of functional spinal mobility and rotation. 2.  Ardith La will demonstrate improved NDI score, indicating spinal improvement from 35/50 to 25/50 affecting minimal to no difficulty with performance of cervical mobility and strengthening. 3.  Gail Prymer to be independent with initial HEP for cervical region and UE's.   4.  Gail Prymer will improve ROM to >=90% to assist with improved function during instrumental activities of daily living. 5.  Ardith La will improve MMT to >=4+/5 to all UE strengthening to return to PLOF and improve functional tolerance. DISCHARGE GOALS: Time Frame: 8 weeks  1. Ardith La will report <=2/10 pain to cervical spine with performance of functional spinal mobility and rotation and minimal to no difficulty with such tasks. 2. Ardith La will demonstrate improved NDI score, indicating spinal improvement from 25/50 to 15/50 affecting minimal to no difficulty with performance of cervical mobility and strengthening. 3. Gail Prymer to be independent with advanced HEP for cervical region and UE's. Rehabilitation Potential For Stated Goals: Good  Regarding Gail Prymer's therapy, I certify that the treatment plan above will be carried out by a therapist or under their direction.   Thank you for this referral,  Noemy Marin PT     Referring Physician Signature: Alyce Pate MD              Date                    HISTORY:   History of Present Injury/Illness (Reason for Referral):  Ms. Valeria Mayo reports onset for left sided neck and arm pain that has disturbed sleep, bathing, dressing, job duties, housework, cooking, eating, sitting, and driving. She reports that her symptoms are occasional sharp, throbbing, twinge, ache, numbness (in the middle three fingers on left hand), tingling (occasional), tight, pulling. Past Medical History/Comorbidities:   Ms. Violet Avalos  has a past medical history of Anxiety; Arthritis; Chronic obstructive pulmonary disease ([address] Chronic pain; Contact dermatitis and other eczema, due to unspecified cause; Diabetes ([address] Hypercholesterolemia; Hypertension; and Trauma. Ms. Violet Avalos  has a past surgical history that includes  section and myomectomy. Social History/Living Environment:     lives alone in an apartment  Prior Level of Function/Work/Activity:    Personal Factors:          Social Background:  Recently moved from another part of the [place]    Current Medications:    Current Outpatient Prescriptions:     fluconazole (DIFLUCAN) 100 mg tablet, One a day for 5 days for yeast infection, Disp: 5 Tab, Rfl: 0    aspirin delayed-release 81 mg tablet, Take  by mouth daily. , Disp: , Rfl:     atenolol (TENORMIN) 25 mg tablet, Take 25 mg by mouth daily. , Disp: , Rfl:     gabapentin (NEURONTIN) 100 mg capsule, One capsule three times a day for nerve pain, Disp: 90 Cap, Rfl: 1    doxepin (SINEQUAN) 10 mg capsule, Take 1 Cap by mouth nightly., Disp: 30 Cap, Rfl: 0    simvastatin (ZOCOR) 20 mg tablet, Take 1 Tab by mouth nightly., Disp: 90 Tab, Rfl: 3    meloxicam (MOBIC) 15 mg tablet, Take 1 Tab by mouth daily. , Disp: 30 Tab, Rfl: 6     Date Last Reviewed:  8/3/2017   Number of Personal Factors/Comorbidities that affect the Plan of Care: 1-2: MODERATE COMPLEXITY   EXAMINATION:   Observation/Orthostatic Postural Assessment: Forward head posture, guarded left arm position, increased thoracic kyphosis. Increased mid cervical spine motion with mid-cervical hinging.   MRI- left C6-7 disc bulge  Palpation:          Tenderness and decreased soft tissue mobility in bilateral upper trapezius, levator, and cervical paraspinals. Limited thoracic spine mobility, increased thoracic spine paraspinal mobility with tenderness throughout. ROM:    Joint: Eval Date: 7/17/2017 Re-Assess Date: Re-Assess Date:         Cervical AROM      Flexion (% of normal): 75     Extension (% of normal): 50     L sidebending (% of normal): 100     R sidebending (% of normal): 75     L rotation (% of normal): 100     R rotation (% of normal): 75                 Pain at end-range or with AROM? Yes/No Yes with right sidebending and rotation     Any pain with overpressure? Yes/No Yes on right spurlings       Strength:    Joint: Eval Date:  Re-Assess Date:  Re-Assess Date:     RIGHT LEFT RIGHT LEFT RIGHT LEFT   Shoulder flexion:  5/5 5/5       Shoulder extension: 5/5 5/5       Shoulder abduction: 5/5 5/5       Shoulder IR: 5/5 5/5       Shoulder ER: 5/5 5/5       Elbow flexion: 5/5 5/5       Elbow extension: 5/5 5/5       Wrist flexion/extension: 5/5 5/5           Special Tests: Assessed @ Initial Visit    Spurling's Test: Positive on right with left pain     Neurological Screen: Radiating symptoms? Yes on left  +left median nerve testing. Functional Mobility:  Assessed @ Initial Visit:  Impaired movement pattern supine-sit     Body Structures Involved:  1. Nerves  2. Thoracic Cage  3. Bones  4. Joints  5. Muscles  6. Ligaments Body Functions Affected:  1. Sensory/Pain  2. Neuromusculoskeletal  3. Movement Related Activities and Participation Affected:  1. General Tasks and Demands  2. Mobility  3. Self Care  4. Community, Social and [place]   Number of elements that affect the Plan of Care: 3: MODERATE COMPLEXITY   CLINICAL PRESENTATION:   Presentation: Stable and uncomplicated: LOW COMPLEXITY   CLINICAL DECISION MAKING:   Outcome Measure:    Tool Used: Neck Disability Index (NDI)  Score:  Initial: 35/50  Most Recent: X/50 (Date: -- )   Interpretation of Score: The Neck Disability Index is a revised form of the Oswestry Low Back Pain Index and is designed to measure the activities of daily living in person's with neck pain. Each section is scored on a 0-5 scale, 5 representing the greatest disability. The scores of each section are added together for a total score of 50. Score 0 1-10 11-20 21-30 31-40 41-49 50   Modifier CH CI CJ CK CL CM CN       Medical Necessity:   · Skilled intervention continues to be required due to address above deficits affecting participation in basic ADLs and overall functional tolerance. Reason for Services/Other Comments:  · Patient continues to require skilled intervention due to address above deficits affecting participation in basic ADLs and overall functional tolerance. Use of outcome tool(s) and clinical judgement create a POC that gives a: Questionable prediction of patient's progress: MODERATE COMPLEXITY   TREATMENT:   (In addition to Assessment/Re-Assessment sessions the following treatments were rendered)  THERAPEUTIC EXERCISE: (15 minutes):  Exercises per grid below to improve mobility, strength, balance and coordination. Required minimal visual and verbal cues to promote proper body alignment and promote proper body posture. Progressed resistance, range, repetitions and complexity of movement as indicated.      Date:  7/17/2017 Date:  7/24/2017 Date:  7/26/2017 Date:  8/1/2017 Date:  8/3/2017   Activity/Exercise Parameters Parameters Parameters     Sidelying trunk rotation 2x10 2x10      Deep cervcial chin tucks 10x5\" 10x5\"      Doorway pec stretch 3x30\" 3x30\" 3x30\"  3x30\"   Median nerve glides 2x20 2x20      Nu-step  x10' x10' x10'    Cervical joint position training   With multi direction control with visual bio-feedback x10' With multi direction control with visual bio-feedback x10'  -JPET in horizontal plane  x5'    TB  ER     Yellow 2x15   Left UE distraction     3x1' with overhead flexion         MANUAL THERAPY: (15 minutes): Joint mobilization, Soft tissue mobilization and Manipulation was utilized and necessary because of the patient's restricted joint motion, painful spasm and loss of articular motion. Instrument assisted soft tissue mobilization to left upper arm and forearm with carpal tunnel. Manual wrist joint mobilization on left. MODALITIES: (15 minutes):  Mechanical cervical traction in supine hooklyin# max tension, 8# least tension. No adverse reaction with treatment. (Used abbreviations: MET - muscle energy technique; PNF - proprioceptive neuromuscular facilitation; NMR - neuromuscular re-education; AP - anterior to posterior; PA - posterior to anterior)    Pre-Treatment Assessment: Reports feeling better with daily home activities, but continued tingling in the left arm intermittently that was exacerbated with carrying groceries. Treatment/Session Assessment: Patient had improved ROM and decreased tingling after treatment. · Pain/ Symptoms: Initial:   5 Post Session:  2 ·   Compliance with Program/Exercises: Will assess as treatment progresses. · Recommendations/Intent for next treatment session: \"Next visit will focus on advancements to more challenging activities and reduction in assistance provided\".   Total Treatment Duration:  PT Patient Time In/Time Out  Time In: 1300  Time Out: [place], PT

## 2017-08-07 ENCOUNTER — HOSPITAL ENCOUNTER (OUTPATIENT)
Dept: PHYSICAL THERAPY | Age: 47
Discharge: HOME | End: 2017-08-07
Payer: MEDICAID

## 2017-08-07 NOTE — PROGRESS NOTES
[facility] at [address]  [phone]   [phone]     OUTPATIENT DAILY NOTE     DATE: 8/7/2017    SUBJECTIVE:  Patient called and cancelled for appointment today. Will plan to follow up on next scheduled visit.     Deuce Velez, PTA

## 2017-08-09 ENCOUNTER — HOSPITAL ENCOUNTER (OUTPATIENT)
Dept: PHYSICAL THERAPY | Age: 47
Discharge: HOME | End: 2017-08-09
Payer: MEDICAID

## 2017-08-09 PROCEDURE — 97012 MECHANICAL TRACTION THERAPY: CPT

## 2017-08-09 PROCEDURE — 97110 THERAPEUTIC EXERCISES: CPT

## 2017-08-09 PROCEDURE — 97140 MANUAL THERAPY 1/> REGIONS: CPT

## 2017-08-09 NOTE — PROGRESS NOTES
Leanna Ornelas  : 1970 [facility] [address]  [phone]   [phone]       OUTPATIENT PHYSICAL THERAPY: Daily Note 2017    ICD-10: Treatment Diagnosis: Cervicalgia (M54.2)   Precautions/Allergies:   Review of patient's allergies indicates no known allergies. Fall Risk Score: 0 (? 5 = High Risk)  MD Orders: Eval and Treat  MEDICAL/REFERRING DIAGNOSIS:  Other cervical disc degeneration, unspecified cervical region [M50.30]  Radiculopathy, cervical region [M54.12]   DATE OF ONSET: May 10, 2017  REFERRING PHYSICIAN: Canelo Quinonez MD  RETURN PHYSICIAN APPOINTMENT: TBD by patient      INITIAL ASSESSMENT:  Ms. Merlin Combe has attended 1 physical therapy session including initial evaluation. Merlin Combe presents with S/S of increased pain, decreased ROM, decreased strength, decreased functional tolerance consistent with S/S of median nerve radiculopathy. Merlin Combe will benefit from home exercise program, therapeutic and postural strengthening exercises, manual therapeutic techniques (ie. Distraction, SOR, myofascial release/soft tissue mobilization) as appropriate to address Leanna Ornelas's current condition. Merlin Combe will benefit from skilled PT (medically necessary) to address above deficits affecting participation in basic ADLs and overall functional tolerance. PROBLEM LIST (Impacting functional limitations):  1. Decreased Strength  2. Decreased ADL/Functional Activities  3. Decreased Transfer Abilities  4. Decreased Ambulation Ability/Technique  5. Decreased Balance  6. Increased Pain  7. Decreased Work Simplification/Energy Conservation Techniques  8. Decreased Flexibility/Joint Mobility  9. Decreased [place] with Home Exercise Program INTERVENTIONS PLANNED:  1. Balance Exercise  2. Bed Mobility  3. Cold  4. Cryotherapy  5. Heat  6. Home Exercise Program (HEP)  7. Manual Therapy  8.  Neuromuscular Re-education/Strengthening  9. Range of Motion (ROM)  10. Therapeutic Activites  11. Therapeutic Exercise/Strengthening  12. Transfer Training   TREATMENT PLAN:  Effective Dates: 7/17/2017 TO 10/17/2017. Frequency/Duration: 2 times a week for 8 weeks  GOALS: (Goals have been discussed and agreed upon with patient.)  SHORT-TERM FUNCTIONAL GOALS: Time Frame: 4 weeks  1. Marcin Mower will report <=5/10 pain to cervical spine with performance of functional spinal mobility and rotation. 2.  Marcin Mower will demonstrate improved NDI score, indicating spinal improvement from 35/50 to 25/50 affecting minimal to no difficulty with performance of cervical mobility and strengthening. 3.  Gail Prymer to be independent with initial HEP for cervical region and UE's.   4.  Gail Prymer will improve ROM to >=90% to assist with improved function during instrumental activities of daily living. 5.  Marcin Mower will improve MMT to >=4+/5 to all UE strengthening to return to PLOF and improve functional tolerance. DISCHARGE GOALS: Time Frame: 8 weeks  1. Marcin Mower will report <=2/10 pain to cervical spine with performance of functional spinal mobility and rotation and minimal to no difficulty with such tasks. 2. Marcin Mower will demonstrate improved NDI score, indicating spinal improvement from 25/50 to 15/50 affecting minimal to no difficulty with performance of cervical mobility and strengthening. 3. Gail Prymer to be independent with advanced HEP for cervical region and UE's. Rehabilitation Potential For Stated Goals: Good  Regarding Gailamador Ornelas's therapy, I certify that the treatment plan above will be carried out by a therapist or under their direction.   Thank you for this referral,  Kary Parker PT     Referring Physician Signature: Gene Sin MD              Date                    HISTORY:   History of Present Injury/Illness (Reason for Referral):  Ms. Huy Melara reports onset for left sided neck and arm pain that has disturbed sleep, bathing, dressing, job duties, housework, cooking, eating, sitting, and driving. She reports that her symptoms are occasional sharp, throbbing, twinge, ache, numbness (in the middle three fingers on left hand), tingling (occasional), tight, pulling. Past Medical History/Comorbidities:   Ms. Mariaelena Mcginnis  has a past medical history of Anxiety; Arthritis; Chronic obstructive pulmonary disease ([address] Chronic pain; Contact dermatitis and other eczema, due to unspecified cause; Diabetes ([address] Hypercholesterolemia; Hypertension; and Trauma. Ms. Mariaelena Mcginnis  has a past surgical history that includes  section and myomectomy. Social History/Living Environment:     lives alone in an apartment  Prior Level of Function/Work/Activity:    Personal Factors:          Social Background:  Recently moved from another part of the [place]    Current Medications:    Current Outpatient Prescriptions:     fluconazole (DIFLUCAN) 100 mg tablet, One a day for 5 days for yeast infection, Disp: 5 Tab, Rfl: 0    aspirin delayed-release 81 mg tablet, Take  by mouth daily. , Disp: , Rfl:     atenolol (TENORMIN) 25 mg tablet, Take 25 mg by mouth daily. , Disp: , Rfl:     gabapentin (NEURONTIN) 100 mg capsule, One capsule three times a day for nerve pain, Disp: 90 Cap, Rfl: 1    doxepin (SINEQUAN) 10 mg capsule, Take 1 Cap by mouth nightly., Disp: 30 Cap, Rfl: 0    simvastatin (ZOCOR) 20 mg tablet, Take 1 Tab by mouth nightly., Disp: 90 Tab, Rfl: 3    meloxicam (MOBIC) 15 mg tablet, Take 1 Tab by mouth daily. , Disp: 30 Tab, Rfl: 6     Date Last Reviewed:  2017   Number of Personal Factors/Comorbidities that affect the Plan of Care: 1-2: MODERATE COMPLEXITY   EXAMINATION:   Observation/Orthostatic Postural Assessment: Forward head posture, guarded left arm position, increased thoracic kyphosis. Increased mid cervical spine motion with mid-cervical hinging.   MRI- left C6-7 disc bulge  Palpation:          Tenderness and decreased soft tissue mobility in bilateral upper trapezius, levator, and cervical paraspinals. Limited thoracic spine mobility, increased thoracic spine paraspinal mobility with tenderness throughout. ROM:    Joint: Eval Date: 7/17/2017 Re-Assess Date: Re-Assess Date:         Cervical AROM      Flexion (% of normal): 75     Extension (% of normal): 50     L sidebending (% of normal): 100     R sidebending (% of normal): 75     L rotation (% of normal): 100     R rotation (% of normal): 75                 Pain at end-range or with AROM? Yes/No Yes with right sidebending and rotation     Any pain with overpressure? Yes/No Yes on right spurlings       Strength:    Joint: Eval Date:  Re-Assess Date:  Re-Assess Date:     RIGHT LEFT RIGHT LEFT RIGHT LEFT   Shoulder flexion:  5/5 5/5       Shoulder extension: 5/5 5/5       Shoulder abduction: 5/5 5/5       Shoulder IR: 5/5 5/5       Shoulder ER: 5/5 5/5       Elbow flexion: 5/5 5/5       Elbow extension: 5/5 5/5       Wrist flexion/extension: 5/5 5/5           Special Tests: Assessed @ Initial Visit    Spurling's Test: Positive on right with left pain     Neurological Screen: Radiating symptoms? Yes on left  +left median nerve testing. Functional Mobility:  Assessed @ Initial Visit:  Impaired movement pattern supine-sit     Body Structures Involved:  1. Nerves  2. Thoracic Cage  3. Bones  4. Joints  5. Muscles  6. Ligaments Body Functions Affected:  1. Sensory/Pain  2. Neuromusculoskeletal  3. Movement Related Activities and Participation Affected:  1. General Tasks and Demands  2. Mobility  3. Self Care  4. Community, Social and [place]   Number of elements that affect the Plan of Care: 3: MODERATE COMPLEXITY   CLINICAL PRESENTATION:   Presentation: Stable and uncomplicated: LOW COMPLEXITY   CLINICAL DECISION MAKING:   Outcome Measure:    Tool Used: Neck Disability Index (NDI)  Score:  Initial: 35/50  Most Recent: X/50 (Date: -- )   Interpretation of Score: The Neck Disability Index is a revised form of the Oswestry Low Back Pain Index and is designed to measure the activities of daily living in person's with neck pain. Each section is scored on a 0-5 scale, 5 representing the greatest disability. The scores of each section are added together for a total score of 50. Score 0 1-10 11-20 21-30 31-40 41-49 50   Modifier CH CI CJ CK CL CM CN       Medical Necessity:   · Skilled intervention continues to be required due to address above deficits affecting participation in basic ADLs and overall functional tolerance. Reason for Services/Other Comments:  · Patient continues to require skilled intervention due to address above deficits affecting participation in basic ADLs and overall functional tolerance. Use of outcome tool(s) and clinical judgement create a POC that gives a: Questionable prediction of patient's progress: MODERATE COMPLEXITY   TREATMENT:   (In addition to Assessment/Re-Assessment sessions the following treatments were rendered)  THERAPEUTIC EXERCISE: (15 minutes):  Exercises per grid below to improve mobility, strength, balance and coordination. Required minimal visual and verbal cues to promote proper body alignment and promote proper body posture. Progressed resistance, range, repetitions and complexity of movement as indicated.      Date:  7/17/2017 Date:  7/24/2017 Date:  7/26/2017 Date:  8/1/2017 Date:  8/3/2017 Date:  8/9/2017   Activity/Exercise Parameters Parameters Parameters      Sidelying trunk rotation 2x10 2x10       Deep cervcial chin tucks 10x5\" 10x5\"       Doorway pec stretch 3x30\" 3x30\" 3x30\"  3x30\" 3x30\"   Median nerve glides 2x20 2x20       Nu-step  x10' x10' x10'     Cervical joint position training   With multi direction control with visual bio-feedback x10' With multi direction control with visual bio-feedback x10'  -JPET in horizontal plane  x5'  With multi direction control with visual bio-feedback x10'  While standing on compliant surface   TB  ER     Yellow 2x15    Left UE distraction     3x1' with overhead flexion          MANUAL THERAPY: (15 minutes): Joint mobilization, Soft tissue mobilization and Manipulation was utilized and necessary because of the patient's restricted joint motion, painful spasm and loss of articular motion. Instrument assisted soft tissue mobilization to left upper arm and forearm with carpal tunnel. Manual wrist joint mobilization on left. MODALITIES: (15 minutes):  Mechanical cervical traction in supine hooklyin# max tension, 8# least tension. No adverse reaction with treatment. (Used abbreviations: MET - muscle energy technique; PNF - proprioceptive neuromuscular facilitation; NMR - neuromuscular re-education; AP - anterior to posterior; PA - posterior to anterior)    Pre-Treatment Assessment: Reports feeling better after last visit, but exacerbation of symptoms after extended house work with family in town. Treatment/Session Assessment: Patient had improved ROM and decreased to minimal tingling after treatment. Patient reports that she feels much better overall. · Pain/ Symptoms: Initial:   4 Post Session:  2 ·   Compliance with Program/Exercises: Will assess as treatment progresses. · Recommendations/Intent for next treatment session: \"Next visit will focus on advancements to more challenging activities and reduction in assistance provided\".   Total Treatment Duration:  PT Patient Time In/Time Out  Time In: 1300  Time Out: [place], PT

## 2017-08-14 ENCOUNTER — HOSPITAL ENCOUNTER (OUTPATIENT)
Dept: PHYSICAL THERAPY | Age: 47
Discharge: HOME | End: 2017-08-14
Payer: MEDICAID

## 2017-08-14 PROCEDURE — 97110 THERAPEUTIC EXERCISES: CPT

## 2017-08-14 PROCEDURE — 97012 MECHANICAL TRACTION THERAPY: CPT

## 2017-08-16 ENCOUNTER — HOSPITAL ENCOUNTER (OUTPATIENT)
Dept: PHYSICAL THERAPY | Age: 47
Discharge: HOME | End: 2017-08-16
Payer: MEDICAID

## 2017-08-16 PROCEDURE — 97110 THERAPEUTIC EXERCISES: CPT

## 2017-08-16 PROCEDURE — 97140 MANUAL THERAPY 1/> REGIONS: CPT

## 2017-08-16 PROCEDURE — 97012 MECHANICAL TRACTION THERAPY: CPT

## 2017-08-16 NOTE — PROGRESS NOTES
Grayson Nuno  : 1970 [facility] [address]  [phone]   [phone]       OUTPATIENT PHYSICAL THERAPY: Daily Note 2017    ICD-10: Treatment Diagnosis: Cervicalgia (M54.2)   Precautions/Allergies:   Review of patient's allergies indicates no known allergies. Fall Risk Score: 0 (? 5 = High Risk)  MD Orders: Eval and Treat  MEDICAL/REFERRING DIAGNOSIS:  Other cervical disc degeneration, unspecified cervical region [M50.30]  Radiculopathy, cervical region [M54.12]   DATE OF ONSET: May 10, 2017  REFERRING PHYSICIAN: Noe Gay MD  RETURN PHYSICIAN APPOINTMENT: TBD by patient      INITIAL ASSESSMENT:  Ms. Grayson Nuno has attended 1 physical therapy session including initial evaluation. Grayson Nuno presents with S/S of increased pain, decreased ROM, decreased strength, decreased functional tolerance consistent with S/S of median nerve radiculopathy. Grayson Nuno will benefit from home exercise program, therapeutic and postural strengthening exercises, manual therapeutic techniques (ie. Distraction, SOR, myofascial release/soft tissue mobilization) as appropriate to address Vee Ornelas's current condition. Grayson Nuno will benefit from skilled PT (medically necessary) to address above deficits affecting participation in basic ADLs and overall functional tolerance. PROBLEM LIST (Impacting functional limitations):  1. Decreased Strength  2. Decreased ADL/Functional Activities  3. Decreased Transfer Abilities  4. Decreased Ambulation Ability/Technique  5. Decreased Balance  6. Increased Pain  7. Decreased Work Simplification/Energy Conservation Techniques  8. Decreased Flexibility/Joint Mobility  9. Decreased [place] with Home Exercise Program INTERVENTIONS PLANNED:  1. Balance Exercise  2. Bed Mobility  3. Cold  4. Cryotherapy  5. Heat  6. Home Exercise Program (HEP)  7. Manual Therapy  8.  Neuromuscular Re-education/Strengthening  9. Range of Motion (ROM)  10. Therapeutic Activites  11. Therapeutic Exercise/Strengthening  12. Transfer Training   TREATMENT PLAN:  Effective Dates: 7/17/2017 TO 10/17/2017. Frequency/Duration: 2 times a week for 8 weeks  GOALS: (Goals have been discussed and agreed upon with patient.)  SHORT-TERM FUNCTIONAL GOALS: Time Frame: 4 weeks  1. Pasquale Bump will report <=5/10 pain to cervical spine with performance of functional spinal mobility and rotation. 2.  Delona Bump will demonstrate improved NDI score, indicating spinal improvement from 35/50 to 25/50 affecting minimal to no difficulty with performance of cervical mobility and strengthening. 3.  Gail Prymer to be independent with initial HEP for cervical region and UE's.   4.  Gail Prymer will improve ROM to >=90% to assist with improved function during instrumental activities of daily living. 5.  Delona Bump will improve MMT to >=4+/5 to all UE strengthening to return to PLOF and improve functional tolerance. DISCHARGE GOALS: Time Frame: 8 weeks  1. Pasquale Bump will report <=2/10 pain to cervical spine with performance of functional spinal mobility and rotation and minimal to no difficulty with such tasks. 2. Delona Bump will demonstrate improved NDI score, indicating spinal improvement from 25/50 to 15/50 affecting minimal to no difficulty with performance of cervical mobility and strengthening. 3. Gail Prymer to be independent with advanced HEP for cervical region and UE's. Rehabilitation Potential For Stated Goals: Good  Regarding Gail Johnson's therapy, I certify that the treatment plan above will be carried out by a therapist or under their direction.   Thank you for this referral,  Ochoa Hernández PT     Referring Physician Signature: Sid Resendez MD              Date                    HISTORY:   History of Present Injury/Illness (Reason for Referral):  Ms. Ioana Kumar reports onset for left sided neck and arm pain that has disturbed sleep, bathing, dressing, job duties, housework, cooking, eating, sitting, and driving. She reports that her symptoms are occasional sharp, throbbing, twinge, ache, numbness (in the middle three fingers on left hand), tingling (occasional), tight, pulling. Past Medical History/Comorbidities:   Ms. Cris Dixon  has a past medical history of Anxiety; Arthritis; Chronic obstructive pulmonary disease ([address] Chronic pain; Contact dermatitis and other eczema, due to unspecified cause; Diabetes ([address] Hypercholesterolemia; Hypertension; and Trauma. Ms. Cris Dixon  has a past surgical history that includes  section and myomectomy. Social History/Living Environment:     lives alone in an apartment  Prior Level of Function/Work/Activity:    Personal Factors:          Social Background:  Recently moved from another part of the [place]    Current Medications:    Current Outpatient Prescriptions:     fluconazole (DIFLUCAN) 100 mg tablet, One a day for 5 days for yeast infection, Disp: 5 Tab, Rfl: 0    aspirin delayed-release 81 mg tablet, Take  by mouth daily. , Disp: , Rfl:     atenolol (TENORMIN) 25 mg tablet, Take 25 mg by mouth daily. , Disp: , Rfl:     gabapentin (NEURONTIN) 100 mg capsule, One capsule three times a day for nerve pain, Disp: 90 Cap, Rfl: 1    doxepin (SINEQUAN) 10 mg capsule, Take 1 Cap by mouth nightly., Disp: 30 Cap, Rfl: 0    simvastatin (ZOCOR) 20 mg tablet, Take 1 Tab by mouth nightly., Disp: 90 Tab, Rfl: 3    meloxicam (MOBIC) 15 mg tablet, Take 1 Tab by mouth daily. , Disp: 30 Tab, Rfl: 6     Date Last Reviewed:  2017   Number of Personal Factors/Comorbidities that affect the Plan of Care: 1-2: MODERATE COMPLEXITY   EXAMINATION:   Observation/Orthostatic Postural Assessment: Forward head posture, guarded left arm position, increased thoracic kyphosis. Increased mid cervical spine motion with mid-cervical hinging.   MRI- left C6-7 disc bulge  Palpation:          Tenderness and decreased soft tissue mobility in bilateral upper trapezius, levator, and cervical paraspinals. Limited thoracic spine mobility, increased thoracic spine paraspinal mobility with tenderness throughout. ROM:    Joint: Eval Date: 7/17/2017 Re-Assess Date: Re-Assess Date:         Cervical AROM      Flexion (% of normal): 75     Extension (% of normal): 50     L sidebending (% of normal): 100     R sidebending (% of normal): 75     L rotation (% of normal): 100     R rotation (% of normal): 75                 Pain at end-range or with AROM? Yes/No Yes with right sidebending and rotation     Any pain with overpressure? Yes/No Yes on right spurlings       Strength:    Joint: Eval Date:  Re-Assess Date:  Re-Assess Date:     RIGHT LEFT RIGHT LEFT RIGHT LEFT   Shoulder flexion:  5/5 5/5       Shoulder extension: 5/5 5/5       Shoulder abduction: 5/5 5/5       Shoulder IR: 5/5 5/5       Shoulder ER: 5/5 5/5       Elbow flexion: 5/5 5/5       Elbow extension: 5/5 5/5       Wrist flexion/extension: 5/5 5/5           Special Tests: Assessed @ Initial Visit    Spurling's Test: Positive on right with left pain     Neurological Screen: Radiating symptoms? Yes on left  +left median nerve testing. Functional Mobility:  Assessed @ Initial Visit:  Impaired movement pattern supine-sit     Body Structures Involved:  1. Nerves  2. Thoracic Cage  3. Bones  4. Joints  5. Muscles  6. Ligaments Body Functions Affected:  1. Sensory/Pain  2. Neuromusculoskeletal  3. Movement Related Activities and Participation Affected:  1. General Tasks and Demands  2. Mobility  3. Self Care  4. Community, Social and [place]   Number of elements that affect the Plan of Care: 3: MODERATE COMPLEXITY   CLINICAL PRESENTATION:   Presentation: Stable and uncomplicated: LOW COMPLEXITY   CLINICAL DECISION MAKING:   Outcome Measure:    Tool Used: Neck Disability Index (NDI)  Score:  Initial: 35/50  Most Recent: X/50 (Date: -- )   Interpretation of Score: The Neck Disability Index is a revised form of the Oswestry Low Back Pain Index and is designed to measure the activities of daily living in person's with neck pain. Each section is scored on a 0-5 scale, 5 representing the greatest disability. The scores of each section are added together for a total score of 50. Score 0 1-10 11-20 21-30 31-40 41-49 50   Modifier CH CI CJ CK CL CM CN       Medical Necessity:   · Skilled intervention continues to be required due to address above deficits affecting participation in basic ADLs and overall functional tolerance. Reason for Services/Other Comments:  · Patient continues to require skilled intervention due to address above deficits affecting participation in basic ADLs and overall functional tolerance. Use of outcome tool(s) and clinical judgement create a POC that gives a: Questionable prediction of patient's progress: MODERATE COMPLEXITY   TREATMENT:   (In addition to Assessment/Re-Assessment sessions the following treatments were rendered)  THERAPEUTIC EXERCISE: (15 minutes):  Exercises per grid below to improve mobility, strength, balance and coordination. Required minimal visual and verbal cues to promote proper body alignment and promote proper body posture. Progressed resistance, range, repetitions and complexity of movement as indicated.      Date:  7/17/2017 Date:  7/24/2017 Date:  7/26/2017 Date:  8/1/2017 Date:  8/3/2017 Date:  8/9/2017 Date:  8/14/2017 Date:  8/16/2017   Activity/Exercise Parameters Parameters Parameters        Sidelying trunk rotation 2x10 2x10         Deep cervcial chin tucks 10x5\" 10x5\"     10x5\" 10x5\"   Doorway pec stretch 3x30\" 3x30\" 3x30\"  3x30\" 3x30\" 3x30\" 3x30\"   Median nerve glides 2x20 2x20         Nu-step  x10' x10' x10'       Cervical joint position training   With multi direction control with visual bio-feedback x10' With multi direction control with visual bio-feedback x10'  -JPET in horizontal plane  x5'  With multi direction control with visual bio-feedback x10'  While standing on compliant surface With multi direction control with visual bio-feedback x10'  While standing on compliant surface With multi direction control with visual bio-feedback x10'  While standing on compliant surface   TB  ER     Yellow 2x15  Red 3x10    Left UE distraction     3x1' with overhead flexion      TB rows       Blue 3x10    Tb extensions       Green 3x10    UBE       x6'                                           MANUAL THERAPY: (15 minutes): Joint mobilization, Soft tissue mobilization and Manipulation was utilized and necessary because of the patient's restricted joint motion, painful spasm and loss of articular motion. Deep trigger point release of left periscapular region and mid-thoracic paraspinals. MODALITIES: (15 minutes):  Mechanical cervical traction in supine hooklyin# max tension, 8# least tension. No adverse reaction with treatment. (Used abbreviations: MET - muscle energy technique; PNF - proprioceptive neuromuscular facilitation; NMR - neuromuscular re-education; AP - anterior to posterior; PA - posterior to anterior)    Pre-Treatment Assessment: Reports feeling better after last visit, with on/off symptoms and intermittent tingling. Treatment/Session Assessment: Patient had improved ROM and decreased to minimal tingling after treatment. Patient reports that she feels much better overall. · Pain/ Symptoms: Initial:   4 Post Session:  2 ·   Compliance with Program/Exercises: Will assess as treatment progresses. · Recommendations/Intent for next treatment session: \"Next visit will focus on advancements to more challenging activities and reduction in assistance provided\".   Total Treatment Duration:  PT Patient Time In/Time Out  Time In: 1300  Time Out: [facility], PT

## 2017-08-22 ENCOUNTER — HOSPITAL ENCOUNTER (OUTPATIENT)
Dept: PHYSICAL THERAPY | Age: 47
Discharge: HOME | End: 2017-08-22
Payer: MEDICAID

## 2017-08-22 PROCEDURE — 97012 MECHANICAL TRACTION THERAPY: CPT

## 2017-08-22 PROCEDURE — 97110 THERAPEUTIC EXERCISES: CPT

## 2017-08-22 PROCEDURE — 97140 MANUAL THERAPY 1/> REGIONS: CPT

## 2017-08-22 NOTE — PROGRESS NOTES
Rosario Alford  : 1970 [facility] at [address]  [phone]   [phone]       OUTPATIENT PHYSICAL THERAPY: Daily Note 2017    ICD-10: Treatment Diagnosis: Cervicalgia (M54.2)   Precautions/Allergies:   Review of patient's allergies indicates no known allergies. Fall Risk Score: 0 (? 5 = High Risk)  MD Orders: Eval and Treat  MEDICAL/REFERRING DIAGNOSIS:  Other cervical disc degeneration, unspecified cervical region [M50.30]  Radiculopathy, cervical region [M54.12]   DATE OF ONSET: May 10, 2017  REFERRING PHYSICIAN: Irving Hampton MD  RETURN PHYSICIAN APPOINTMENT: TBD by patient      INITIAL ASSESSMENT:  Ms. Rosario Alford has attended 1 physical therapy session including initial evaluation. Rosario Alford presents with S/S of increased pain, decreased ROM, decreased strength, decreased functional tolerance consistent with S/S of median nerve radiculopathy. Rosario Alford will benefit from home exercise program, therapeutic and postural strengthening exercises, manual therapeutic techniques (ie. Distraction, SOR, myofascial release/soft tissue mobilization) as appropriate to address Osvaldo Ornelas's current condition. Rosario Alford will benefit from skilled PT (medically necessary) to address above deficits affecting participation in basic ADLs and overall functional tolerance. PROBLEM LIST (Impacting functional limitations):  1. Decreased Strength  2. Decreased ADL/Functional Activities  3. Decreased Transfer Abilities  4. Decreased Ambulation Ability/Technique  5. Decreased Balance  6. Increased Pain  7. Decreased Work Simplification/Energy Conservation Techniques  8. Decreased Flexibility/Joint Mobility  9. Decreased [place] with Home Exercise Program INTERVENTIONS PLANNED:  1. Balance Exercise  2. Bed Mobility  3. Cold  4. Cryotherapy  5. Heat  6. Home Exercise Program (HEP)  7. Manual Therapy  8.  Neuromuscular Re-education/Strengthening  9. Range of Motion (ROM)  10. Therapeutic Activites  11. Therapeutic Exercise/Strengthening  12. Transfer Training   TREATMENT PLAN:  Effective Dates: 7/17/2017 TO 10/17/2017. Frequency/Duration: 2 times a week for 8 weeks  GOALS: (Goals have been discussed and agreed upon with patient.)  SHORT-TERM FUNCTIONAL GOALS: Time Frame: 4 weeks  1. Ejl Bulls will report <=5/10 pain to cervical spine with performance of functional spinal mobility and rotation. 2.  Mariamarl Bulls will demonstrate improved NDI score, indicating spinal improvement from 35/50 to 25/50 affecting minimal to no difficulty with performance of cervical mobility and strengthening. 3.  Gail Prymer to be independent with initial HEP for cervical region and UE's.   4.  Gail Prymer will improve ROM to >=90% to assist with improved function during instrumental activities of daily living. 5.  Ejl Bulls will improve MMT to >=4+/5 to all UE strengthening to return to PLOF and improve functional tolerance. DISCHARGE GOALS: Time Frame: 8 weeks  1. Ejl Bulls will report <=2/10 pain to cervical spine with performance of functional spinal mobility and rotation and minimal to no difficulty with such tasks. 2. Ejl Bulls will demonstrate improved NDI score, indicating spinal improvement from 25/50 to 15/50 affecting minimal to no difficulty with performance of cervical mobility and strengthening. 3. Gail Prymer to be independent with advanced HEP for cervical region and UE's. Rehabilitation Potential For Stated Goals: Good  Regarding Gailamador Ornelas's therapy, I certify that the treatment plan above will be carried out by a therapist or under their direction.   Thank you for this referral,  Marisol Chow PT     Referring Physician Signature: Humberto Sol MD              Date                    HISTORY:   History of Present Injury/Illness (Reason for Referral):  Ms. Shari Archuleta reports onset for left sided neck and arm pain that has disturbed sleep, bathing, dressing, job duties, housework, cooking, eating, sitting, and driving. She reports that her symptoms are occasional sharp, throbbing, twinge, ache, numbness (in the middle three fingers on left hand), tingling (occasional), tight, pulling. Past Medical History/Comorbidities:   Ms. Shaun Hines  has a past medical history of Anxiety; Arthritis; Chronic obstructive pulmonary disease ([address] Chronic pain; Contact dermatitis and other eczema, due to unspecified cause; Diabetes ([address] Hypercholesterolemia; Hypertension; and Trauma. Ms. Shaun Hines  has a past surgical history that includes  section and myomectomy. Social History/Living Environment:     lives alone in an apartment  Prior Level of Function/Work/Activity:    Personal Factors:          Social Background:  Recently moved from another part of the [place]    Current Medications:    Current Outpatient Prescriptions:     fluconazole (DIFLUCAN) 100 mg tablet, One a day for 5 days for yeast infection, Disp: 5 Tab, Rfl: 0    aspirin delayed-release 81 mg tablet, Take  by mouth daily. , Disp: , Rfl:     atenolol (TENORMIN) 25 mg tablet, Take 25 mg by mouth daily. , Disp: , Rfl:     gabapentin (NEURONTIN) 100 mg capsule, One capsule three times a day for nerve pain, Disp: 90 Cap, Rfl: 1    doxepin (SINEQUAN) 10 mg capsule, Take 1 Cap by mouth nightly., Disp: 30 Cap, Rfl: 0    simvastatin (ZOCOR) 20 mg tablet, Take 1 Tab by mouth nightly., Disp: 90 Tab, Rfl: 3    meloxicam (MOBIC) 15 mg tablet, Take 1 Tab by mouth daily. , Disp: 30 Tab, Rfl: 6     Date Last Reviewed:  2017   Number of Personal Factors/Comorbidities that affect the Plan of Care: 1-2: MODERATE COMPLEXITY   EXAMINATION:   Observation/Orthostatic Postural Assessment: Forward head posture, guarded left arm position, increased thoracic kyphosis. Increased mid cervical spine motion with mid-cervical hinging.   MRI- left C6-7 disc bulge  Palpation:          Tenderness and decreased soft tissue mobility in bilateral upper trapezius, levator, and cervical paraspinals. Limited thoracic spine mobility, increased thoracic spine paraspinal mobility with tenderness throughout. ROM:    Joint: Eval Date: 7/17/2017 Re-Assess Date: Re-Assess Date:         Cervical AROM      Flexion (% of normal): 75     Extension (% of normal): 50     L sidebending (% of normal): 100     R sidebending (% of normal): 75     L rotation (% of normal): 100     R rotation (% of normal): 75                 Pain at end-range or with AROM? Yes/No Yes with right sidebending and rotation     Any pain with overpressure? Yes/No Yes on right spurlings       Strength:    Joint: Eval Date:  Re-Assess Date:  Re-Assess Date:     RIGHT LEFT RIGHT LEFT RIGHT LEFT   Shoulder flexion:  5/5 5/5       Shoulder extension: 5/5 5/5       Shoulder abduction: 5/5 5/5       Shoulder IR: 5/5 5/5       Shoulder ER: 5/5 5/5       Elbow flexion: 5/5 5/5       Elbow extension: 5/5 5/5       Wrist flexion/extension: 5/5 5/5           Special Tests: Assessed @ Initial Visit    Spurling's Test: Positive on right with left pain     Neurological Screen: Radiating symptoms? Yes on left  +left median nerve testing. Functional Mobility:  Assessed @ Initial Visit:  Impaired movement pattern supine-sit     Body Structures Involved:  1. Nerves  2. Thoracic Cage  3. Bones  4. Joints  5. Muscles  6. Ligaments Body Functions Affected:  1. Sensory/Pain  2. Neuromusculoskeletal  3. Movement Related Activities and Participation Affected:  1. General Tasks and Demands  2. Mobility  3. Self Care  4. Community, Social and [place]   Number of elements that affect the Plan of Care: 3: MODERATE COMPLEXITY   CLINICAL PRESENTATION:   Presentation: Stable and uncomplicated: LOW COMPLEXITY   CLINICAL DECISION MAKING:   Outcome Measure:    Tool Used: Neck Disability Index (NDI)  Score:  Initial: 35/50  Most Recent: X/50 (Date: -- )   Interpretation of Score: The Neck Disability Index is a revised form of the Oswestry Low Back Pain Index and is designed to measure the activities of daily living in person's with neck pain. Each section is scored on a 0-5 scale, 5 representing the greatest disability. The scores of each section are added together for a total score of 50. Score 0 1-10 11-20 21-30 31-40 41-49 50   Modifier CH CI CJ CK CL CM CN       Medical Necessity:   · Skilled intervention continues to be required due to address above deficits affecting participation in basic ADLs and overall functional tolerance. Reason for Services/Other Comments:  · Patient continues to require skilled intervention due to address above deficits affecting participation in basic ADLs and overall functional tolerance. Use of outcome tool(s) and clinical judgement create a POC that gives a: Questionable prediction of patient's progress: MODERATE COMPLEXITY   TREATMENT:   (In addition to Assessment/Re-Assessment sessions the following treatments were rendered)  THERAPEUTIC EXERCISE: (15 minutes):  Exercises per grid below to improve mobility, strength, balance and coordination. Required minimal visual and verbal cues to promote proper body alignment and promote proper body posture. Progressed resistance, range, repetitions and complexity of movement as indicated.      Date:  8/22/2017   Activity/Exercise    Sidelying trunk rotation    Deep cervcial chin tucks 10x5\"   Doorway pec stretch 3x30\"   Median nerve glides    Nu-step    Cervical joint position training With multi direction control with visual bio-feedback x10'  While standing on compliant surface   TB  ER    Left UE distraction    TB rows    Tb extensions    UBE                      MANUAL THERAPY: (15 minutes): Joint mobilization, Soft tissue mobilization and Manipulation was utilized and necessary because of the patient's restricted joint motion, painful spasm and loss of articular motion. Deep trigger point release of left periscapular region and mid-thoracic paraspinals. MODALITIES: (15 minutes):  Mechanical cervical traction in supine hooklyin# max tension, 8# least tension. No adverse reaction with treatment. (Used abbreviations: MET - muscle energy technique; PNF - proprioceptive neuromuscular facilitation; NMR - neuromuscular re-education; AP - anterior to posterior; PA - posterior to anterior)    Pre-Treatment Assessment: Reports feeling better after last visit, with on/off symptoms and intermittent tingling. Patient reports that she has been working with her HEP regularly and feels that there is gradual improvement. Treatment/Session Assessment: Patient had improved ROM and decreased to minimal tingling after treatment. Patient reports that she feels much better overall. · Pain/ Symptoms: Initial:   3 Post Session:  1 ·   Compliance with Program/Exercises: Will assess as treatment progresses. · Recommendations/Intent for next treatment session: \"Next visit will focus on advancements to more challenging activities and reduction in assistance provided\".   Total Treatment Duration:  PT Patient Time In/Time Out  Time In: 1300  Time Out: [place], PT

## 2017-08-24 ENCOUNTER — HOSPITAL ENCOUNTER (OUTPATIENT)
Dept: PHYSICAL THERAPY | Age: 47
Discharge: HOME | End: 2017-08-24
Payer: MEDICAID

## 2017-08-24 PROCEDURE — 97140 MANUAL THERAPY 1/> REGIONS: CPT

## 2017-08-24 PROCEDURE — 97110 THERAPEUTIC EXERCISES: CPT

## 2017-08-28 NOTE — PROGRESS NOTES
Jacki Mueller  : 1970 [facility] [address]  [phone]   [phone]       OUTPATIENT PHYSICAL THERAPY: Daily Note 2017      ICD-10: Treatment Diagnosis: Cervicalgia (M54.2)   Precautions/Allergies:   Review of patient's allergies indicates no known allergies. Fall Risk Score: 0 (? 5 = High Risk)  MD Orders: Eval and Treat  MEDICAL/REFERRING DIAGNOSIS:  Other cervical disc degeneration, unspecified cervical region [M50.30]  Radiculopathy, cervical region [M54.12]   DATE OF ONSET: May 10, 2017  REFERRING PHYSICIAN: Jessika Frank MD  RETURN PHYSICIAN APPOINTMENT: TBD by patient      INITIAL ASSESSMENT:  Ms. Jacki Mueller has attended 10 physical therapy session including initial evaluation. Jacki Mueller presents with S/S of increased pain, decreased ROM, decreased strength, decreased functional tolerance consistent with S/S of median nerve radiculopathy. Jacki Mueller reports minimal improvement in symptoms since starting therapy and will benefit from medical follow up in addition to continued PT for progression of home exercise program, therapeutic and postural strengthening exercises, manual therapeutic techniques (ie. Distraction, SOR, myofascial release/soft tissue mobilization) as appropriate to address Paulette Ornelas's current condition. PROBLEM LIST (Impacting functional limitations):  1. Decreased Strength  2. Decreased ADL/Functional Activities  3. Decreased Transfer Abilities  4. Decreased Ambulation Ability/Technique  5. Decreased Balance  6. Increased Pain  7. Decreased Work Simplification/Energy Conservation Techniques  8. Decreased Flexibility/Joint Mobility  9. Decreased Kay with Home Exercise Program INTERVENTIONS PLANNED:  1. Balance Exercise  2. Bed Mobility  3. Cold  4. Cryotherapy  5. Heat  6. Home Exercise Program (HEP)  7. Manual Therapy  8.  Neuromuscular Re-education/Strengthening  9. Range of Motion (ROM)  10. Therapeutic Activites  11. Therapeutic Exercise/Strengthening  12. Transfer Training   TREATMENT PLAN:  Effective Dates: 7/17/2017 TO 10/17/2017. Frequency/Duration: 2 times a week for 8 weeks  GOALS: (Goals have been discussed and agreed upon with patient.)  SHORT-TERM FUNCTIONAL GOALS: Time Frame: 4 weeks  1. Suzi Nassar will report <=5/10 pain to cervical spine with performance of functional spinal mobility and rotation. progressed  2. Suzi Maday will demonstrate improved NDI score, indicating spinal improvement from 35/50 to 25/50 affecting minimal to no difficulty with performance of cervical mobility and strengthening. Not met  3. Suzi Maday to be independent with initial HEP for cervical region and UE's. Met  4. Suzi Maday will improve ROM to >=90% to assist with improved function during instrumental activities of daily living. Not met  5. Suzi Maday will improve MMT to >=4+/5 to all UE strengthening to return to PLOF and improve functional tolerance. Met    DISCHARGE GOALS: Time Frame: 8 weeks  1. Suzi Nassar will report <=2/10 pain to cervical spine with performance of functional spinal mobility and rotation and minimal to no difficulty with such tasks. Not Met  2. Suzi Maday will demonstrate improved NDI score, indicating spinal improvement from 25/50 to 15/50 affecting minimal to no difficulty with performance of cervical mobility and strengthening. Not Met  3. Suzi Maday to be independent with advanced HEP for cervical region and UE's. Not Met    Rehabilitation Potential For Stated Goals: Good  Regarding Gail Ornelas's therapy, I certify that the treatment plan above will be carried out by a therapist or under their direction.   Thank you for this referral,  Cynthia Hassan PT     Referring Physician Signature: Christy Wallace MD              Date                    HISTORY:   History of Present Injury/Illness (Reason for Referral):  Ms. Kyle Query reports onset for left sided neck and arm pain that has disturbed sleep, bathing, dressing, job duties, housework, cooking, eating, sitting, and driving. She reports that her symptoms are occasional sharp, throbbing, twinge, ache, numbness (in the middle three fingers on left hand), tingling (occasional), tight, pulling. Past Medical History/Comorbidities:   Ms. Saroj Earl  has a past medical history of Anxiety; Arthritis; Chronic obstructive pulmonary disease ([address] Chronic pain; Contact dermatitis and other eczema, due to unspecified cause; Diabetes ([facility] [address] Hypercholesterolemia; Hypertension; and Trauma. Ms. Saroj Earl  has a past surgical history that includes  section and myomectomy. Social History/Living Environment:     lives alone in an apartment  Prior Level of Function/Work/Activity:    Personal Factors:          Social Background:  Recently moved from another part of the [place]    Current Medications:    Current Outpatient Prescriptions:     fluconazole (DIFLUCAN) 100 mg tablet, One a day for 5 days for yeast infection, Disp: 5 Tab, Rfl: 0    aspirin delayed-release 81 mg tablet, Take  by mouth daily. , Disp: , Rfl:     atenolol (TENORMIN) 25 mg tablet, Take 25 mg by mouth daily. , Disp: , Rfl:     gabapentin (NEURONTIN) 100 mg capsule, One capsule three times a day for nerve pain, Disp: 90 Cap, Rfl: 1    doxepin (SINEQUAN) 10 mg capsule, Take 1 Cap by mouth nightly., Disp: 30 Cap, Rfl: 0    simvastatin (ZOCOR) 20 mg tablet, Take 1 Tab by mouth nightly., Disp: 90 Tab, Rfl: 3    meloxicam (MOBIC) 15 mg tablet, Take 1 Tab by mouth daily. , Disp: 30 Tab, Rfl: 6     Date Last Reviewed:  2017   Number of Personal Factors/Comorbidities that affect the Plan of Care: 1-2: MODERATE COMPLEXITY   EXAMINATION:   Observation/Orthostatic Postural Assessment: Forward head posture, guarded left arm position, increased thoracic kyphosis.   Increased mid cervical spine motion with mid-cervical hinging. MRI- left C6-7 disc bulge  Palpation:          Tenderness and decreased soft tissue mobility in bilateral upper trapezius, levator, and cervical paraspinals. Limited thoracic spine mobility, increased thoracic spine paraspinal mobility with tenderness throughout. ROM:    Joint: Eval Date: 7/17/2017 Re-Assess Date: Re-Assess Date:         Cervical AROM      Flexion (% of normal): 75     Extension (% of normal): 75     L sidebending (% of normal): 100     R sidebending (% of normal): 75     L rotation (% of normal): 100     R rotation (% of normal): 75                 Pain at end-range or with AROM? Yes/No Yes with right sidebending and rotation     Any pain with overpressure? Yes/No Yes on right spurlings       Strength:    Joint: Eval Date:  Re-Assess Date:  Re-Assess Date:     RIGHT LEFT RIGHT LEFT RIGHT LEFT   Shoulder flexion:  5/5 5/5       Shoulder extension: 5/5 5/5       Shoulder abduction: 5/5 5/5       Shoulder IR: 5/5 5/5       Shoulder ER: 5/5 5/5       Elbow flexion: 5/5 5/5       Elbow extension: 5/5 5/5       Wrist flexion/extension: 5/5 5/5           Special Tests: Assessed @ Initial Visit    Spurling's Test: Positive on right with left pain     Neurological Screen: Radiating symptoms? Yes on left  +left median nerve testing. Functional Mobility:  Assessed @ Initial Visit:  Impaired movement pattern supine-sit     Body Structures Involved:  1. Nerves  2. Thoracic Cage  3. Bones  4. Joints  5. Muscles  6. Ligaments Body Functions Affected:  1. Sensory/Pain  2. Neuromusculoskeletal  3. Movement Related Activities and Participation Affected:  1. General Tasks and Demands  2. Mobility  3. Self Care  4. Community, Social and [place]   Number of elements that affect the Plan of Care: 3: MODERATE COMPLEXITY   CLINICAL PRESENTATION:   Presentation: Stable and uncomplicated: LOW COMPLEXITY   CLINICAL DECISION MAKING:   Outcome Measure:    Tool Used: Neck Disability Index (NDI)  Score:  Initial: 35/50  Most Recent: X/50 (Date: -- )   Interpretation of Score: The Neck Disability Index is a revised form of the Oswestry Low Back Pain Index and is designed to measure the activities of daily living in person's with neck pain. Each section is scored on a 0-5 scale, 5 representing the greatest disability. The scores of each section are added together for a total score of 50. Score 0 1-10 11-20 21-30 31-40 41-49 50   Modifier CH CI CJ CK CL CM CN       Medical Necessity:   · Skilled intervention continues to be required due to address above deficits affecting participation in basic ADLs and overall functional tolerance. Reason for Services/Other Comments:  · Patient continues to require skilled intervention due to address above deficits affecting participation in basic ADLs and overall functional tolerance. Use of outcome tool(s) and clinical judgement create a POC that gives a: Questionable prediction of patient's progress: MODERATE COMPLEXITY   TREATMENT:   (In addition to Assessment/Re-Assessment sessions the following treatments were rendered)  THERAPEUTIC EXERCISE: (30 minutes):  Exercises per grid below to improve mobility, strength, balance and coordination. Required minimal visual and verbal cues to promote proper body alignment and promote proper body posture. Progressed resistance, range, repetitions and complexity of movement as indicated.      Date:  8/24/2017   Activity/Exercise    Sidelying trunk rotation    Deep cervcial chin tucks 10x5\"   Doorway pec stretch 3x30\"   Median nerve glides    Nu-step x10' for improved general contditioning   Cervical joint position training With multi direction control with visual bio-feedback x10'  While standing on compliant surface   TB  ER    Left UE distraction    TB rows 3x10 with postural scapular cueing   Tb extensions 3x10 with postural scapular cueing   UBE                      MANUAL THERAPY: (15 minutes): Joint mobilization, Soft tissue mobilization and Manipulation was utilized and necessary because of the patient's restricted joint motion, painful spasm and loss of articular motion. Deep trigger point release of left periscapular region and mid-thoracic paraspinals. MODALITIES: (15 minutes):  Mechanical cervical traction in supine hooklyin# max tension, 8# least tension. No adverse reaction with treatment. (Used abbreviations: MET - muscle energy technique; PNF - proprioceptive neuromuscular facilitation; NMR - neuromuscular re-education; AP - anterior to posterior; PA - posterior to anterior)    Pre-Treatment Assessment: Reports that overall she feels that she has only made minimal progress and has had continued symptoms over the week with increased housework. Treatment/Session Assessment: Patient had improved ROM and decreased to minimal tingling after treatment. Patient had less positive subjective report today. · Pain/ Symptoms: Initial:   3-4 Post Session:  3 ·   Compliance with Program/Exercises: Will assess as treatment progresses. · Recommendations/Intent for next treatment session: \"Next visit will focus on advancements to more challenging activities and reduction in assistance provided\".   Total Treatment Duration:  PT Patient Time In/Time Out  Time In: 1200  Time Out: [address], PT

## 2017-09-13 PROBLEM — R73.02 IMPAIRED GLUCOSE TOLERANCE: Chronic | Status: ACTIVE | Noted: 2017-09-13

## 2017-09-13 PROBLEM — N18.30 STAGE 3 CHRONIC KIDNEY DISEASE (HCC): Chronic | Status: ACTIVE | Noted: 2017-09-13

## 2017-09-13 PROBLEM — M54.12 CERVICAL RADICULOPATHY: Chronic | Status: ACTIVE | Noted: 2017-06-29

## 2017-09-13 PROBLEM — M50.30 DDD (DEGENERATIVE DISC DISEASE), CERVICAL: Chronic | Status: ACTIVE | Noted: 2017-06-29

## 2017-10-09 ENCOUNTER — APPOINTMENT (OUTPATIENT)
Dept: CT IMAGING | Age: 47
End: 2017-10-09
Attending: EMERGENCY MEDICINE
Payer: MEDICAID

## 2017-10-09 ENCOUNTER — APPOINTMENT (OUTPATIENT)
Dept: GENERAL RADIOLOGY | Age: 47
End: 2017-10-09
Attending: EMERGENCY MEDICINE
Payer: MEDICAID

## 2017-10-09 ENCOUNTER — HOSPITAL ENCOUNTER (EMERGENCY)
Age: 47
Discharge: HOME | End: 2017-10-10
Attending: EMERGENCY MEDICINE
Payer: MEDICAID

## 2017-10-09 DIAGNOSIS — R20.2 NUMBNESS AND TINGLING: Primary | ICD-10-CM

## 2017-10-09 DIAGNOSIS — M54.2 CHRONIC NECK PAIN: ICD-10-CM

## 2017-10-09 DIAGNOSIS — M54.50 ACUTE LOW BACK PAIN WITHOUT SCIATICA, UNSPECIFIED BACK PAIN LATERALITY: ICD-10-CM

## 2017-10-09 DIAGNOSIS — R20.0 NUMBNESS AND TINGLING: Primary | ICD-10-CM

## 2017-10-09 DIAGNOSIS — G89.29 CHRONIC NECK PAIN: ICD-10-CM

## 2017-10-09 LAB
ALBUMIN SERPL-MCNC: 4 G/DL (ref 3.5–5)
ALBUMIN/GLOB SERPL: 1 {RATIO} (ref 1.2–3.5)
ALP SERPL-CCNC: 52 U/L (ref 50–136)
ALT SERPL-CCNC: 28 U/L (ref 12–65)
ANION GAP SERPL CALC-SCNC: 6 MMOL/L (ref 7–16)
AST SERPL-CCNC: 18 U/L (ref 15–37)
ATRIAL RATE: 73 BPM
BASOPHILS # BLD: 0 K/UL (ref 0–0.2)
BASOPHILS NFR BLD: 0 % (ref 0–2)
BILIRUB SERPL-MCNC: 0.3 MG/DL (ref 0.2–1.1)
BUN SERPL-MCNC: 16 MG/DL (ref 6–23)
CALCIUM SERPL-MCNC: 8.8 MG/DL (ref 8.3–10.4)
CALCULATED P AXIS, ECG09: 66 DEGREES
CALCULATED R AXIS, ECG10: 24 DEGREES
CALCULATED T AXIS, ECG11: 31 DEGREES
CHLORIDE SERPL-SCNC: 107 MMOL/L (ref 98–107)
CO2 SERPL-SCNC: 31 MMOL/L (ref 21–32)
CREAT SERPL-MCNC: 1.15 MG/DL (ref 0.6–1)
DIAGNOSIS, 93000: NORMAL
DIFFERENTIAL METHOD BLD: ABNORMAL
EOSINOPHIL # BLD: 0.2 K/UL (ref 0–0.8)
EOSINOPHIL NFR BLD: 2 % (ref 0.5–7.8)
ERYTHROCYTE [DISTWIDTH] IN BLOOD BY AUTOMATED COUNT: 13.7 % (ref 11.9–14.6)
GLOBULIN SER CALC-MCNC: 4 G/DL (ref 2.3–3.5)
GLUCOSE SERPL-MCNC: 102 MG/DL (ref 65–100)
HCT VFR BLD AUTO: 39.7 % (ref 35.8–46.3)
HGB BLD-MCNC: 13.4 G/DL (ref 11.7–15.4)
IMM GRANULOCYTES # BLD: 0 K/UL (ref 0–0.5)
IMM GRANULOCYTES NFR BLD: 0.2 % (ref 0–5)
LYMPHOCYTES # BLD: 3.4 K/UL (ref 0.5–4.6)
LYMPHOCYTES NFR BLD: 37 % (ref 13–44)
MCH RBC QN AUTO: 31.4 PG (ref 26.1–32.9)
MCHC RBC AUTO-ENTMCNC: 33.8 G/DL (ref 31.4–35)
MCV RBC AUTO: 93 FL (ref 79.6–97.8)
MONOCYTES # BLD: 0.5 K/UL (ref 0.1–1.3)
MONOCYTES NFR BLD: 6 % (ref 4–12)
NEUTS SEG # BLD: 5.1 K/UL (ref 1.7–8.2)
NEUTS SEG NFR BLD: 55 % (ref 43–78)
P-R INTERVAL, ECG05: 164 MS
PLATELET # BLD AUTO: 292 K/UL (ref 150–450)
PMV BLD AUTO: 10.6 FL (ref 10.8–14.1)
POTASSIUM SERPL-SCNC: 3.9 MMOL/L (ref 3.5–5.1)
PROT SERPL-MCNC: 8 G/DL (ref 6.3–8.2)
Q-T INTERVAL, ECG07: 372 MS
QRS DURATION, ECG06: 80 MS
QTC CALCULATION (BEZET), ECG08: 409 MS
RBC # BLD AUTO: 4.27 M/UL (ref 4.05–5.25)
SODIUM SERPL-SCNC: 144 MMOL/L (ref 136–145)
TROPONIN I SERPL-MCNC: <0.02 NG/ML (ref 0.02–0.05)
VENTRICULAR RATE, ECG03: 73 BPM
WBC # BLD AUTO: 9.2 K/UL (ref 4.3–11.1)

## 2017-10-09 PROCEDURE — 70450 CT HEAD/BRAIN W/O DYE: CPT

## 2017-10-09 PROCEDURE — 99284 EMERGENCY DEPT VISIT MOD MDM: CPT | Performed by: EMERGENCY MEDICINE

## 2017-10-09 PROCEDURE — 93005 ELECTROCARDIOGRAM TRACING: CPT | Performed by: EMERGENCY MEDICINE

## 2017-10-09 PROCEDURE — 84484 ASSAY OF TROPONIN QUANT: CPT | Performed by: EMERGENCY MEDICINE

## 2017-10-09 PROCEDURE — 85025 COMPLETE CBC W/AUTO DIFF WBC: CPT | Performed by: EMERGENCY MEDICINE

## 2017-10-09 PROCEDURE — 72040 X-RAY EXAM NECK SPINE 2-3 VW: CPT

## 2017-10-09 PROCEDURE — 72100 X-RAY EXAM L-S SPINE 2/3 VWS: CPT

## 2017-10-09 PROCEDURE — 80053 COMPREHEN METABOLIC PANEL: CPT | Performed by: EMERGENCY MEDICINE

## 2017-10-09 NOTE — ED NOTES
Triage note reviewed. On eval, patient with complaint of intermittent tingling to L side neck, face, LUE, LLE x several months. Patient with known neck/back problems- has been seen by PMD and neurosurg Clay Calderón) for cervical radiculopathy, DDD with L arm tingling/pain/weakness. . Patient with = , - arm sway, - facial droop, pupils PERRLA. Patient speaking clearly w/o difficulty. No s/s of acute neuro distress. Patient reports some intermittent light headedness. Will secure EKG, trop as precaution. Patient restful in room awaiting provider.

## 2017-10-10 VITALS
SYSTOLIC BLOOD PRESSURE: 137 MMHG | OXYGEN SATURATION: 98 % | DIASTOLIC BLOOD PRESSURE: 87 MMHG | HEIGHT: 67 IN | HEART RATE: 80 BPM | RESPIRATION RATE: 20 BRPM | WEIGHT: 188 LBS | BODY MASS INDEX: 29.51 KG/M2 | TEMPERATURE: 98.4 F

## 2017-10-10 NOTE — ED PROVIDER NOTES
HPI Comments: Per nurse's notes: \"On eval, patient with complaint of intermittent tingling to L side neck, face, LUE, LLE x several months. Patient with known neck/back problems- has been seen by PMD and neurosurg Astrid Angel) for cervical radiculopathy, DDD with L arm tingling/pain/weakness. . Patient with = , - arm sway, - facial droop, pupils PERRLA. Patient speaking clearly w/o difficulty. No s/s of acute neuro distress. Patient reports some intermittent light headedness. Will secure EKG, trop as precaution. Patient restful in room awaiting provider. \"    Patient is a history of chronic neck pain and actually carrying her MRI report from earlier this year with her. Her numbness sensation to her left upper extremity is consistent with the C  67 foraminal narrowing. Patient is a 48 y.o. female presenting with numbness. The history is provided by the patient. Numbness   This is a new problem. The current episode started more than 1 week ago. The problem has been gradually worsening. There was left facial focality noted. Primary symptoms include loss of sensation. Pertinent negatives include no focal weakness, no loss of balance, no slurred speech, no speech difficulty, no memory loss, no movement disorder, no agitation, no visual change, no auditory change, no mental status change, no unresponsiveness and no disorientation. There has been no fever. Associated symptoms include headaches (does complain of some lightheadedness, and a generalized fullness. ). Pertinent negatives include no shortness of breath, no chest pain, no vomiting, no altered mental status, no confusion, no choking, no nausea, no bowel incontinence and no bladder incontinence. There were no medications administered prior to arrival. Associated medical issues do not include trauma, mood changes, bleeding disorder, seizures, dementia, CVA or clotting disorder.         Past Medical History:   Diagnosis Date    Anxiety     Arthritis     Chronic obstructive pulmonary disease (HCC)     Chronic pain     Contact dermatitis and other eczema, due to unspecified cause     Diabetes ([facility] [address]     Hypercholesterolemia     Hypertension     Trauma        Past Surgical History:   Procedure Laterality Date    HX  SECTION      x2    HX MYOMECTOMY           Family History:   Problem Relation Age of Onset   Vertie Amis Arthritis-osteo Mother     Hypertension Mother     Hypertension Father     Hypertension Sister     Thyroid Disease Sister     Other Sister      fibroids    Hypertension Brother     Heart Attack Paternal Grandmother     Diabetes Paternal Grandmother      colon    Cancer Paternal Grandmother        Social History     Social History    Marital status: SINGLE     Spouse name: N/A    Number of children: N/A    Years of education: N/A     Occupational History    Not on file. Social History Main Topics    Smoking status: Current Some Day Smoker    Smokeless tobacco: Never Used    Alcohol use Yes      Comment: occ    Drug use: No    Sexual activity: Not on file     Other Topics Concern    Not on file     Social History Narrative         ALLERGIES: Review of patient's allergies indicates no known allergies. Review of Systems   Constitutional: Negative for chills and fever. Respiratory: Negative for choking and shortness of breath. Cardiovascular: Negative for chest pain. Gastrointestinal: Negative for bowel incontinence, nausea and vomiting. Genitourinary: Negative for bladder incontinence. Musculoskeletal: Positive for arthralgias, back pain and neck pain. Negative for gait problem. Neurological: Positive for numbness and headaches (does complain of some lightheadedness, and a generalized fullness. ). Negative for dizziness, focal weakness, facial asymmetry, speech difficulty, weakness and loss of balance. Psychiatric/Behavioral: Negative for agitation, confusion and memory loss.    All other systems reviewed and are negative. Vitals:    10/09/17 1846   BP: 139/79   Pulse: 83   Resp: 16   Temp: 98.3 °F (36.8 °C)   SpO2: 100%   Weight: 85.3 kg (188 lb)   Height: 5' 7\" (1.702 m)            Physical Exam   Constitutional: She is oriented to person, place, and time. She appears well-developed and well-nourished. No distress. HENT:   Head: Normocephalic and atraumatic. Right Ear: Tympanic membrane and external ear normal.   Left Ear: Tympanic membrane and external ear normal.   Mouth/Throat: Oropharynx is clear and moist.   Eyes: Conjunctivae and EOM are normal. Pupils are equal, round, and reactive to light. Neck: Normal range of motion. Neck supple. No tracheal deviation present. Cardiovascular: Normal rate, regular rhythm, normal heart sounds and intact distal pulses. Exam reveals no gallop and no friction rub. No murmur heard. Pulmonary/Chest: Effort normal and breath sounds normal. No respiratory distress. She has no wheezes. Abdominal: Soft. Bowel sounds are normal. She exhibits no distension and no mass. There is no hepatosplenomegaly. There is no tenderness. There is no rebound and no guarding. Musculoskeletal: Normal range of motion. She exhibits no edema. Lymphadenopathy:     She has no cervical adenopathy. Neurological: She is alert and oriented to person, place, and time. She has normal strength. She displays normal reflexes. A sensory deficit (subjective decreased sensation to light touch to the ulnar aspect of the forearm and the fourth and fifth digits of the left hand) is present. No cranial nerve deficit (subjective decreased sensation to light touch to the face not including the forehead. ). Coordination and gait normal.   Skin: Skin is warm and dry. No rash noted. She is not diaphoretic. No erythema. Psychiatric: She has a normal mood and affect. Her speech is normal and behavior is normal. Cognition and memory are normal.   Nursing note and vitals reviewed.        MDM  Number of Diagnoses or Management Options  Acute low back pain without sciatica, unspecified back pain laterality: established and worsening  Chronic neck pain: established and worsening  Numbness and tingling: established and worsening     Amount and/or Complexity of Data Reviewed  Clinical lab tests: ordered and reviewed  Tests in the radiology section of CPT®: ordered and reviewed  Decide to obtain previous medical records or to obtain history from someone other than the patient: yes  Review and summarize past medical records: yes  Independent visualization of images, tracings, or specimens: yes    Risk of Complications, Morbidity, and/or Mortality  Presenting problems: high  Diagnostic procedures: moderate  Management options: moderate    Patient Progress  Patient progress: stable    ED Course       Procedures      Results Reviewed:      Recent Results (from the past 24 hour(s))   CBC WITH AUTOMATED DIFF    Collection Time: 10/09/17  6:51 PM   Result Value Ref Range    WBC 9.2 4.3 - 11.1 K/uL    RBC 4.27 4.05 - 5.25 M/uL    HGB 13.4 11.7 - 15.4 g/dL    HCT 39.7 35.8 - 46.3 %    MCV 93.0 79.6 - 97.8 FL    MCH 31.4 26.1 - 32.9 PG    MCHC 33.8 31.4 - 35.0 g/dL    RDW 13.7 11.9 - 14.6 %    PLATELET 311 295 - 539 K/uL    MPV 10.6 (L) 10.8 - 14.1 FL    DF AUTOMATED      NEUTROPHILS 55 43 - 78 %    LYMPHOCYTES 37 13 - 44 %    MONOCYTES 6 4.0 - 12.0 %    EOSINOPHILS 2 0.5 - 7.8 %    BASOPHILS 0 0.0 - 2.0 %    IMMATURE GRANULOCYTES 0.2 0.0 - 5.0 %    ABS. NEUTROPHILS 5.1 1.7 - 8.2 K/UL    ABS. LYMPHOCYTES 3.4 0.5 - 4.6 K/UL    ABS. MONOCYTES 0.5 0.1 - 1.3 K/UL    ABS. EOSINOPHILS 0.2 0.0 - 0.8 K/UL    ABS. BASOPHILS 0.0 0.0 - 0.2 K/UL    ABS. IMM.  GRANS. 0.0 0.0 - 0.5 K/UL   METABOLIC PANEL, COMPREHENSIVE    Collection Time: 10/09/17  6:51 PM   Result Value Ref Range    Sodium 144 136 - 145 mmol/L    Potassium 3.9 3.5 - 5.1 mmol/L    Chloride 107 98 - 107 mmol/L    CO2 31 21 - 32 mmol/L    Anion gap 6 (L) 7 - 16 mmol/L    Glucose 102 (H) 65 - 100 mg/dL    BUN 16 6 - 23 MG/DL    Creatinine 1.15 (H) 0.6 - 1.0 MG/DL    GFR est AA >60 >60 ml/min/1.73m2    GFR est non-AA 54 (L) >60 ml/min/1.73m2    Calcium 8.8 8.3 - 10.4 MG/DL    Bilirubin, total 0.3 0.2 - 1.1 MG/DL    ALT (SGPT) 28 12 - 65 U/L    AST (SGOT) 18 15 - 37 U/L    Alk. phosphatase 52 50 - 136 U/L    Protein, total 8.0 6.3 - 8.2 g/dL    Albumin 4.0 3.5 - 5.0 g/dL    Globulin 4.0 (H) 2.3 - 3.5 g/dL    A-G Ratio 1.0 (L) 1.2 - 3.5     TROPONIN I    Collection Time: 10/09/17  6:51 PM   Result Value Ref Range    Troponin-I, Qt. <0.02 (L) 0.02 - 0.05 NG/ML   EKG, 12 LEAD, INITIAL    Collection Time: 10/09/17  8:03 PM   Result Value Ref Range    Ventricular Rate 73 BPM    Atrial Rate 73 BPM    P-R Interval 164 ms    QRS Duration 80 ms    Q-T Interval 372 ms    QTC Calculation (Bezet) 409 ms    Calculated P Axis 66 degrees    Calculated R Axis 24 degrees    Calculated T Axis 31 degrees    Diagnosis       Normal sinus rhythm  Nonspecific T wave abnormality  Abnormal ECG  No previous ECGs available  Confirmed by Loly Coto MD (), PEGGY HASTINGS ([id number]) on 10/9/2017 10:43:40 PM         I discussed the results of all labs, procedures, radiographs, and treatments with the patient and available family. Treatment plan is agreed upon and the patient is ready for discharge. All voiced understanding of the discharge plan and medication instructions or changes as appropriate. Questions about treatment in the ED were answered. All were encouraged to return should symptoms worsen or new problems develop.

## 2017-10-10 NOTE — ED NOTES
Patient advised of plan of care. Will secure UDS, send patient for CT. Patient ambulatory to restroom w/o difficulty.

## 2017-10-10 NOTE — ED NOTES
Patient remains restful in bed awaiting provider. Patient denies any changing or worsening symptoms.

## 2017-10-10 NOTE — ED NOTES
I have reviewed discharge instructions with the patient. The patient verbalized understanding. Patient to follow up with PMD as referred and RTED with any changes/concerns. Patient expresses understanding. Patient ambulatory from ED in NAD. No new Rx.

## 2017-10-10 NOTE — DISCHARGE INSTRUCTIONS
Learning About Relief for Back Pain  What is back tension and strain? Back strain happens when you overstretch, or pull, a muscle in your back. You may hurt your back in an accident or when you exercise or lift something. Most back pain will get better with rest and time. You can take care of yourself at home to help your back heal.  What can you do first to relieve back pain? When you first feel back pain, try these steps:  · Walk. Take a short walk (10 to 20 minutes) on a level surface (no slopes, hills, or stairs) every 2 to 3 hours. Walk only distances you can manage without pain, especially leg pain. · Relax. Find a comfortable position for rest. Some people are comfortable on the floor or a medium-firm bed with a small pillow under their head and another under their knees. Some people prefer to lie on their side with a pillow between their knees. Don't stay in one position for too long. · Try heat or ice. Try using a heating pad on a low or medium setting, or take a warm shower, for 15 to 20 minutes every 2 to 3 hours. Or you can buy single-use heat wraps that last up to 8 hours. You can also try an ice pack for 10 to 15 minutes every 2 to 3 hours. You can use an ice pack or a bag of frozen vegetables wrapped in a thin towel. There is not strong evidence that either heat or ice will help, but you can try them to see if they help. You may also want to try switching between heat and cold. · Take pain medicine exactly as directed. ¨ If the doctor gave you a prescription medicine for pain, take it as prescribed. ¨ If you are not taking a prescription pain medicine, ask your doctor if you can take an over-the-counter medicine. What else can you do? · Stretch and exercise. Exercises that increase flexibility may relieve your pain and make it easier for your muscles to keep your spine in a good, neutral position. And don't forget to keep walking. · Do self-massage.  You can use self-massage to unwind after work or school or to energize yourself in the morning. You can easily massage your feet, hands, or neck. Self-massage works best if you are in comfortable clothes and are sitting or lying in a comfortable position. Use oil or lotion to massage bare skin. · Reduce stress. Back pain can lead to a vicious Stebbins: Distress about the pain tenses the muscles in your back, which in turn causes more pain. Learn how to relax your mind and your muscles to lower your stress. Where can you learn more? Go to http://anurag-dana.info/. Enter D594 in the search box to learn more about \"Learning About Relief for Back Pain. \"  Current as of: March 21, 2017  Content Version: 11.3  © 1727-5346 [employer]. Care instructions adapted under license by [employer] (which disclaims liability or warranty for this information). If you have questions about a medical condition or this instruction, always ask your healthcare professional. [place] [address] any warranty or liability for your use of this information. Neck Pain: Care Instructions  Your Care Instructions  You can have neck pain anywhere from the bottom of your head to the top of your shoulders. It can spread to the upper back or arms. Injuries, painting a ceiling, sleeping with your neck twisted, staying in one position for too long, and many other activities can cause neck pain. Most neck pain gets better with home care. Your doctor may recommend medicine to relieve pain or relax your muscles. He or she may suggest exercise and physical therapy to increase flexibility and relieve stress. You may need to wear a special (cervical) collar to support your neck for a day or two. Follow-up care is a key part of your treatment and safety. Be sure to make and go to all appointments, and call your doctor if you are having problems.  It's also a good idea to know your test results and keep a list of the medicines you take. How can you care for yourself at home? · Try using a heating pad on a low or medium setting for 15 to 20 minutes every 2 or 3 hours. Try a warm shower in place of one session with the heating pad. · You can also try an ice pack for 10 to 15 minutes every 2 to 3 hours. Put a thin cloth between the ice and your skin. · Take pain medicines exactly as directed. ¨ If the doctor gave you a prescription medicine for pain, take it as prescribed. ¨ If you are not taking a prescription pain medicine, ask your doctor if you can take an over-the-counter medicine. · If your doctor recommends a cervical collar, wear it exactly as directed. When should you call for help? Call your doctor now or seek immediate medical care if:  · You have new or worsening numbness in your arms, buttocks or legs. · You have new or worsening weakness in your arms or legs. (This could make it hard to stand up.)  · You lose control of your bladder or bowels. Watch closely for changes in your health, and be sure to contact your doctor if:  · Your neck pain is getting worse. · You are not getting better after 1 week. · You do not get better as expected. Where can you learn more? Go to http://anurag-dana.info/. Enter [id number] in the search box to learn more about \"Neck Pain: Care Instructions. \"  Current as of: March 21, 2017  Content Version: 11.3  © 5521-8697 [employer]. Care instructions adapted under license by [facility] (which disclaims liability or warranty for this information). If you have questions about a medical condition or this instruction, always ask your healthcare professional. [place] [address] any warranty or liability for your use of this information. Numbness and Tingling: Care Instructions  Your Care Instructions  Many things can cause numbness or tingling. Swelling may put pressure on a nerve.  This could cause you to lose feeling or have a pins-and-needles sensation on part of your body. Nerves may be damaged from trauma, toxins, or diseases, such as diabetes or multiple sclerosis (MS). Sometimes, though, the cause is not clear. If there is no clear reason for your symptoms, and you are not having any other symptoms, your doctor may suggest watching and waiting for a while to see if the numbness or tingling goes away on its own. Your doctor may want you to have blood or nerve tests to find the cause of your symptoms. Follow-up care is a key part of your treatment and safety. Be sure to make and go to all appointments, and call your doctor if you are having problems. It's also a good idea to know your test results and keep a list of the medicines you take. How can you care for yourself at home? · If your doctor prescribes medicine, take it exactly as directed. Call your doctor if you think you are having a problem with your medicine. · If you have any swelling, put ice or a cold pack on the area for 10 to 20 minutes at a time. Put a thin cloth between the ice and your skin. When should you call for help? Call 911 anytime you think you may need emergency care. For example, call if:  · You have weakness, numbness, or tingling in both legs. · You lose bowel or bladder control. · You have symptoms of a stroke. These may include:  ¨ Sudden numbness, tingling, weakness, or loss of movement in your face, arm, or leg, especially on only one side of your body. ¨ Sudden vision changes. ¨ Sudden trouble speaking. ¨ Sudden confusion or trouble understanding simple statements. ¨ Sudden problems with walking or balance. ¨ A sudden, severe headache that is different from past headaches. Watch closely for changes in your health, and be sure to contact your doctor if you have any problems, or if:  · You do not get better as expected. Where can you learn more? Go to http://anurag-dana.info/.   Enter D360 in the search box to learn more about \"Numbness and Tingling: Care Instructions. \"  Current as of: October 14, 2016  Content Version: 11.3  © 4013-4096 [employer]. Care instructions adapted under license by [employer] (which disclaims liability or warranty for this information). If you have questions about a medical condition or this instruction, always ask your healthcare professional. [address] any warranty or liability for your use of this information.

## 2017-12-20 ENCOUNTER — HOSPITAL ENCOUNTER (OUTPATIENT)
Dept: MRI IMAGING | Age: 47
Discharge: HOME | End: 2017-12-20
Attending: FAMILY MEDICINE
Payer: MEDICAID

## 2017-12-20 DIAGNOSIS — M53.87 SCIATICA OF LEFT SIDE ASSOCIATED WITH DISORDER OF LUMBOSACRAL SPINE: ICD-10-CM

## 2017-12-20 PROCEDURE — 72148 MRI LUMBAR SPINE W/O DYE: CPT

## 2017-12-22 NOTE — PROGRESS NOTES
There is a disc bulge at L3-4 but no disc herniations and no evidence of a bony tumor. This is a good report and does not look like surgery would be needed.

## 2018-03-05 ENCOUNTER — HOSPITAL ENCOUNTER (OUTPATIENT)
Dept: MAMMOGRAPHY | Age: 48
Discharge: HOME | End: 2018-03-05
Attending: FAMILY MEDICINE
Payer: MEDICAID

## 2018-03-05 DIAGNOSIS — Z12.31 ENCOUNTER FOR SCREENING MAMMOGRAM FOR BREAST CANCER: ICD-10-CM

## 2018-03-05 PROCEDURE — 77067 SCR MAMMO BI INCL CAD: CPT

## 2018-03-13 NOTE — PROGRESS NOTES
NO SPECIFIC MAMMOGRAPHIC EVIDENCE FOR MALIGNANCY.  FOLLOW UP BILATERAL SCREENING  MAMMOGRAPHY IS RECOMMENDED IN ONE YEAR.

## 2018-06-07 ENCOUNTER — HOSPITAL ENCOUNTER (OUTPATIENT)
Dept: MRI IMAGING | Age: 48
Discharge: HOME | End: 2018-06-07
Attending: FAMILY MEDICINE

## 2018-06-13 ENCOUNTER — HOSPITAL ENCOUNTER (OUTPATIENT)
Dept: MRI IMAGING | Age: 48
Discharge: HOME | End: 2018-06-13
Attending: FAMILY MEDICINE
Payer: MEDICAID

## 2018-06-13 DIAGNOSIS — R51.9 PERSISTENT HEADACHES: ICD-10-CM

## 2018-06-13 DIAGNOSIS — R20.0 LEFT FACIAL NUMBNESS: ICD-10-CM

## 2018-06-13 PROCEDURE — 70551 MRI BRAIN STEM W/O DYE: CPT

## 2018-06-15 NOTE — PROGRESS NOTES
Please let patient know her brain MRI looks all normal. No stroke, tumor or mass.  Radiologist noted the scan was normal. Signed By: Abel Cotton MD  [facility]  [address] [place] [address]., [address], [address] [facility]  [phone]  [phone]  fax   Edwige 15, 2018

## 2018-06-18 NOTE — PROGRESS NOTES
Pt advised and sts she is going to have to get something done about this that she is still in pain and thought that you also ordered on her neck.

## 2019-04-26 ENCOUNTER — HOSPITAL ENCOUNTER (OUTPATIENT)
Dept: MAMMOGRAPHY | Age: 49
Discharge: HOME | End: 2019-04-26
Attending: FAMILY MEDICINE

## 2019-04-26 DIAGNOSIS — Z12.39 BREAST SCREENING, UNSPECIFIED: ICD-10-CM

## 2019-05-23 PROBLEM — Z12.11 ENCOUNTER FOR SCREENING COLONOSCOPY: Status: ACTIVE | Noted: 2019-05-23

## 2019-07-29 PROBLEM — N18.30 STAGE 3 CHRONIC KIDNEY DISEASE (HCC): Chronic | Status: RESOLVED | Noted: 2017-09-13 | Resolved: 2019-07-29

## 2019-07-29 RX ORDER — SODIUM CHLORIDE, SODIUM LACTATE, POTASSIUM CHLORIDE, CALCIUM CHLORIDE 600; 310; 30; 20 MG/100ML; MG/100ML; MG/100ML; MG/100ML
100 INJECTION, SOLUTION INTRAVENOUS CONTINUOUS
Status: CANCELLED | OUTPATIENT
Start: 2019-07-29

## 2019-07-30 ENCOUNTER — HOSPITAL ENCOUNTER (OUTPATIENT)
Age: 49
Setting detail: OUTPATIENT SURGERY
Discharge: HOME | End: 2019-07-30
Attending: SURGERY | Admitting: SURGERY

## 2019-09-02 ENCOUNTER — ANESTHESIA EVENT (OUTPATIENT)
Dept: ENDOSCOPY | Age: 49
End: 2019-09-02
Payer: MEDICAID

## 2019-09-02 RX ORDER — SODIUM CHLORIDE, SODIUM LACTATE, POTASSIUM CHLORIDE, CALCIUM CHLORIDE 600; 310; 30; 20 MG/100ML; MG/100ML; MG/100ML; MG/100ML
100 INJECTION, SOLUTION INTRAVENOUS CONTINUOUS
Status: CANCELLED | OUTPATIENT
Start: 2019-09-02

## 2019-09-02 RX ORDER — SODIUM CHLORIDE 0.9 % (FLUSH) 0.9 %
5-40 SYRINGE (ML) INJECTION EVERY 8 HOURS
Status: CANCELLED | OUTPATIENT
Start: 2019-09-02

## 2019-09-02 RX ORDER — SODIUM CHLORIDE 0.9 % (FLUSH) 0.9 %
5-40 SYRINGE (ML) INJECTION AS NEEDED
Status: CANCELLED | OUTPATIENT
Start: 2019-09-02

## 2019-09-03 ENCOUNTER — ANESTHESIA (OUTPATIENT)
Dept: ENDOSCOPY | Age: 49
End: 2019-09-03
Payer: MEDICAID

## 2019-09-03 ENCOUNTER — HOSPITAL ENCOUNTER (OUTPATIENT)
Age: 49
Setting detail: OUTPATIENT SURGERY
Discharge: HOME | End: 2019-09-03
Attending: SURGERY | Admitting: SURGERY
Payer: MEDICAID

## 2019-09-03 VITALS
HEIGHT: 66 IN | HEART RATE: 76 BPM | RESPIRATION RATE: 16 BRPM | SYSTOLIC BLOOD PRESSURE: 115 MMHG | BODY MASS INDEX: 32.3 KG/M2 | TEMPERATURE: 98.5 F | WEIGHT: 201 LBS | DIASTOLIC BLOOD PRESSURE: 65 MMHG | OXYGEN SATURATION: 97 %

## 2019-09-03 PROCEDURE — 76060000031 HC ANESTHESIA FIRST 0.5 HR: Performed by: SURGERY

## 2019-09-03 PROCEDURE — 76040000025: Performed by: SURGERY

## 2019-09-03 PROCEDURE — 74011250636 HC RX REV CODE- 250/636

## 2019-09-03 PROCEDURE — 74011250636 HC RX REV CODE- 250/636: Performed by: ANESTHESIOLOGY

## 2019-09-03 RX ORDER — PROPOFOL 10 MG/ML
INJECTION, EMULSION INTRAVENOUS
Status: DISCONTINUED | OUTPATIENT
Start: 2019-09-03 | End: 2019-09-03 | Stop reason: HOSPADM

## 2019-09-03 RX ORDER — LIDOCAINE HYDROCHLORIDE 20 MG/ML
INJECTION, SOLUTION EPIDURAL; INFILTRATION; INTRACAUDAL; PERINEURAL AS NEEDED
Status: DISCONTINUED | OUTPATIENT
Start: 2019-09-03 | End: 2019-09-03 | Stop reason: HOSPADM

## 2019-09-03 RX ORDER — PROPOFOL 10 MG/ML
INJECTION, EMULSION INTRAVENOUS AS NEEDED
Status: DISCONTINUED | OUTPATIENT
Start: 2019-09-03 | End: 2019-09-03 | Stop reason: HOSPADM

## 2019-09-03 RX ORDER — SODIUM CHLORIDE, SODIUM LACTATE, POTASSIUM CHLORIDE, CALCIUM CHLORIDE 600; 310; 30; 20 MG/100ML; MG/100ML; MG/100ML; MG/100ML
100 INJECTION, SOLUTION INTRAVENOUS CONTINUOUS
Status: DISCONTINUED | OUTPATIENT
Start: 2019-09-03 | End: 2019-09-03 | Stop reason: HOSPADM

## 2019-09-03 RX ADMIN — LIDOCAINE HYDROCHLORIDE 60 MG: 20 INJECTION, SOLUTION EPIDURAL; INFILTRATION; INTRACAUDAL; PERINEURAL at 08:36

## 2019-09-03 RX ADMIN — PROPOFOL 70 MG: 10 INJECTION, EMULSION INTRAVENOUS at 08:36

## 2019-09-03 RX ADMIN — PROPOFOL 140 MCG/KG/MIN: 10 INJECTION, EMULSION INTRAVENOUS at 08:36

## 2019-09-03 RX ADMIN — SODIUM CHLORIDE, SODIUM LACTATE, POTASSIUM CHLORIDE, AND CALCIUM CHLORIDE 100 ML/HR: 600; 310; 30; 20 INJECTION, SOLUTION INTRAVENOUS at 07:44

## 2019-09-03 NOTE — H&P
Gordy Ornelas    9/3/2019      Subjective:     Patient is a 46 y.o. female who was sent by their primary care physician for screening colonoscopy. Pt denies any symptoms of abdominal pain, change in bowel habits, blood per rectum, unexplained weight loss, or other symptoms that would be of concern for colon cancer. Patient Active Problem List    Diagnosis Date Noted    Encounter for screening colonoscopy 2019    Impaired glucose tolerance 2017    DDD (degenerative disc disease), cervical 2017    Cervical radiculopathy 2017    Mixed hyperlipidemia 2016    Essential hypertension 2016     Past Medical History:   Diagnosis Date    Anxiety     Arthritis     Chronic obstructive pulmonary disease (HCC)     Chronic pain     Contact dermatitis and other eczema, due to unspecified cause     Diabetes ([facility] [address]     Hypercholesterolemia     Hypertension     Trauma       Past Surgical History:   Procedure Laterality Date    HX  SECTION      x2    HX MYOMECTOMY        Prior to Admission Medications   Prescriptions Last Dose Informant Patient Reported? Taking? VITAMIN B COMPLEX PO 2019 at Unknown time  Yes Yes   Sig: Take 1 Tab by mouth. VITAMIN B COMPLEX PO 2019 at Unknown time  Yes Yes   Sig: Take 1 Tab by mouth. aspirin delayed-release 81 mg tablet Not Taking at Unknown time  Yes No   Sig: Take  by mouth daily. atenolol (TENORMIN) 25 mg tablet 9/3/2019 at Unknown time  No Yes   Sig: Take 1 Tab by mouth daily. ergocalciferol, vitamin D2, 2,000 unit tab 2019 at Unknown time  Yes Yes   Sig: Take 1 Cap by mouth.   levothyroxine (SYNTHROID) 25 mcg tablet 9/3/2019 at Unknown time  No Yes   Sig: Take 1 Tab by mouth Daily (before breakfast). simvastatin (ZOCOR) 20 mg tablet 9/3/2019 at Unknown time  No Yes   Sig: Take 1 Tab by mouth nightly.       Facility-Administered Medications: None     No Known Allergies   Social History     Tobacco Use    Smoking status: Current Some Day Smoker    Smokeless tobacco: Never Used   Substance Use Topics    Alcohol use: Yes     Comment: occ      Social History     Social History Narrative    Not on file     Family History   Problem Relation Age of Onset   Brenna.Antoine Arthritis-osteo Mother     Hypertension Mother    Brenna.Hait Hypertension Father     Hypertension Sister     Thyroid Disease Sister     Other Sister         fibroids    Hypertension Brother     Heart Attack Paternal Grandmother     Diabetes Paternal Grandmother         colon    Cancer Paternal Grandmother         Current Facility-Administered Medications   Medication Dose Route Frequency    lactated Ringers infusion  100 mL/hr IntraVENous CONTINUOUS       Review of Systems  A comprehensive review of systems was negative except for that written in the HPI. Objective:     Vitals:    09/03/19 0739   BP: 124/70   Pulse: 75   Resp: 16   Temp: 98.5 °F (36.9 °C)   SpO2: 98%   Weight: 201 lb (91.2 kg)   Height: 5' 6\" (1.676 m)     PHYSICAL EXAM     Gen- the patient is well developed and in no acute distress  HEENT- PERRL, EOMI, no scleral icterus       nose without alar flaring or epistaxis                  oral muscosa moist without cyanosis  Neck- no JVD or retractions  Lungs-clear  Heart- RRR without M,G,R  Abd- soft and non-tender; with positive bowel sounds. Ext- warm without cyanosis. There is no lower leg edema. Skin- no jaundice or rashes, no wounds   Neuro- alert and oriented x 3. No gross sensorimotor deficits are present. Plan:     Age appropriate screening colonoscopy. I have discussed the risks, benefits and alternatives of surgery. Pt understands and wishes to proceed.   Consent obtained           Jazz Irene MD

## 2019-09-03 NOTE — ANESTHESIA POSTPROCEDURE EVALUATION
Procedure(s):  COLONOSCOPY /BMI 28.    total IV anesthesia    Anesthesia Post Evaluation      Multimodal analgesia: multimodal analgesia used between 6 hours prior to anesthesia start to PACU discharge  Patient location during evaluation: PACU  Patient participation: complete - patient participated  Level of consciousness: awake and alert  Pain management: adequate  Airway patency: patent  Anesthetic complications: no  Cardiovascular status: acceptable and hemodynamically stable  Respiratory status: acceptable  Hydration status: acceptable        Vitals Value Taken Time   /65 9/3/2019  9:24 AM   Temp     Pulse 76 9/3/2019  9:24 AM   Resp 38 9/3/2019  9:24 AM   SpO2 97 % 9/3/2019  9:24 AM   Vitals shown include unvalidated device data. 3

## 2019-09-03 NOTE — ANESTHESIA PREPROCEDURE EVALUATION
Relevant Problems   No relevant active problems       Anesthetic History   No history of anesthetic complications            Review of Systems / Medical History  Patient summary reviewed and pertinent labs reviewed    Pulmonary    COPD: mild    Sleep apnea  Smoker         Neuro/Psych   Within defined limits           Cardiovascular    Hypertension          Hyperlipidemia    Exercise tolerance: >4 METS     GI/Hepatic/Renal  Within defined limits              Endo/Other      Hypothyroidism  Obesity     Other Findings            Physical Exam    Airway  Mallampati: III  TM Distance: 4 - 6 cm  Neck ROM: normal range of motion   Mouth opening: Normal     Cardiovascular    Rhythm: regular  Rate: normal         Dental         Pulmonary  Breath sounds clear to auscultation               Abdominal         Other Findings            Anesthetic Plan    ASA: 2  Anesthesia type: total IV anesthesia          Induction: Intravenous  Anesthetic plan and risks discussed with: Patient and Family

## 2019-09-03 NOTE — ROUTINE PROCESS
Vital signs stable. No complaints noted. Education given and reviewed with sister. Pt discharged via wheelchair by Chanell Stewart [facility].

## 2019-09-03 NOTE — DISCHARGE INSTRUCTIONS
Gastrointestinal Colonoscopy/Flexible Sigmoidoscopy - Lower Exam Discharge Instructions  1. Call Dr. Mateus Shea at [phone] for any problems or questions. 2. Contact the doctors office for follow up appointment as directed  3. Medication may cause drowsiness for several hours, therefore:  · Do not drive or operate machinery for reminder of the day. · No alcohol today. · Do not make any important or legal decisions for 24 hours. · Do not sign any legal documents for 24 hours. 4. Ordinarily, you may resume regular diet and activity after exam unless otherwise specified by your physician. 5. Because of air put into your colon during exam, you may experience some abdominal distension, relieved by the passage of gas, for several hours. 6. Contact your physician if you have any of the following:  · Excessive amount of bleeding - large amount when having a bowel movement. Occasional specks of blood with bowel movement would not be unusual.  · Severe abdominal pain  · Fever or Chills  Any additional instructions: repeat colonoscopy in 10 years; follow up with primary care doctor as needed. Instructions given to [employer] and other family members.

## 2019-09-20 PROBLEM — Z12.11 ENCOUNTER FOR SCREENING COLONOSCOPY: Status: RESOLVED | Noted: 2019-05-23 | Resolved: 2019-09-20

## 2019-10-09 NOTE — PROCEDURES
[address] [facility]  [phone]    Colonoscopy Procedure Note    Name: Aleisha Ornelas     Date: 9/3/2019  Med Record Number: [id number]   Age: 46 y.o. Sex: female   Procedure: Colonoscopy --screening  Pre-operative Diagnosis:  screening  Post-operative Diagnosis: normal colon  Indications: screening for colon cancer  Anesthesia/Sedation: MAC IV MAC anesthesia Propofol  Procedure Details:    Informed consent was obtained for the procedure, including sedation. Risks of perforation, hemorrhage, adverse drug reaction and aspiration were discussed. The patient was placed in the left lateral decubitus position. Based on the pre-procedure assessment, including review of the patient's medical history, medications, allergies, and review of systems, she had been deemed to be an appropriate candidate for sedation by the Anesthesia Dept. The patient was monitored continuously with ECG tracing, pulse oximetry, blood pressure monitoring, and direct observations. A time out was performed. Once sedation was adequate, a rectal examination was performed. The scope was inserted into the rectum and advanced under direct vision to the cecum, which was identified by the ileocecal valve and appendiceal orifice. The quality of the colonic preparation was good. A careful inspection was made as the colonoscope was withdrawn, including a retroflexed view of the rectum; findings and interventions are described below. Appropriate photodocumentation was obtained. Findings: ANUS: Anal exam reveals no masses or hemorrhoids, sphincter tone is normal.   RECTUM: Rectal exam reveals no masses or hemorrhoids. SIGMOID COLON: The mucosa is normal with good vascular pattern and without ulcers, diverticula, and polyps. DESCENDING COLON: The mucosa is normal with good vascular pattern and without ulcers, diverticula, and polyps.    SPLENIC FLEXURE: The splenic flexure is normal.   TRANSVERSE COLON: The mucosa is normal with good vascular pattern and without ulcers, diverticula, and polyps. HEPATIC FLEXURE: The hepatic flexure is normal.   ASCENDING COLON: The mucosa is normal with good vascular pattern and without ulcers, diverticula, and polyps. CECUM: The appendiceal orifice appears normal. The ileocecal valve appears normal.   TERMINAL ILEUM: The terminal ileum was not entered. Specimens: none    Estimated Blood Loss:  0-minimum           Complications: None; patient tolerated the procedure well. Attending Attestation: I performed the procedure. Impression:  Normal colonoscopy to the cecum, with no evidence of neoplasia, diverticular disease, or mucosal abnormality. Recommendations:-For colon cancer screening in this average-risk patient, colonoscopy may be repeated in 10 years.     Jose Cui MD

## 2019-10-30 PROBLEM — E11.65 TYPE 2 DIABETES MELLITUS WITH HYPERGLYCEMIA, WITHOUT LONG-TERM CURRENT USE OF INSULIN (HCC): Status: ACTIVE | Noted: 2019-10-30

## 2019-10-30 PROBLEM — E07.9 THYROID DISEASE: Status: ACTIVE | Noted: 2019-10-30

## 2020-09-28 ENCOUNTER — HOSPITAL ENCOUNTER (OUTPATIENT)
Dept: ULTRASOUND IMAGING | Age: 50
Discharge: HOME | End: 2020-09-28
Attending: NURSE PRACTITIONER
Payer: MEDICAID

## 2020-09-28 DIAGNOSIS — N95.0 ABNORMAL VAGINAL BLEEDING IN POSTMENOPAUSAL PATIENT: ICD-10-CM

## 2020-09-28 PROCEDURE — 76830 TRANSVAGINAL US NON-OB: CPT

## 2020-09-28 NOTE — PROGRESS NOTES
Please let patient know that her ultrasound showed uterine fibroids and recommended an endometrial biopsy. Have her follow up with gynecology as planned.

## 2020-09-28 NOTE — PROGRESS NOTES
Please let the patient know that her ultrasound showed uterine fibroids and recommended an endometrial biopsy. Have her follow up with gynecology as planned.

## 2020-09-30 ENCOUNTER — HOSPITAL ENCOUNTER (OUTPATIENT)
Dept: LAB | Age: 50
Discharge: HOME | End: 2020-09-30

## 2020-09-30 PROCEDURE — 88305 TISSUE EXAM BY PATHOLOGIST: CPT

## 2020-10-02 NOTE — PROGRESS NOTES
LMOM path was benign. She can see how things go over the next few months or if she wants she can move forward with a hysterectomy.

## 2020-11-24 PROBLEM — G56.03 BILATERAL CARPAL TUNNEL SYNDROME: Status: ACTIVE | Noted: 2020-11-24

## 2021-03-10 ENCOUNTER — HOSPITAL ENCOUNTER (OUTPATIENT)
Dept: MAMMOGRAPHY | Age: 51
Discharge: HOME | End: 2021-03-10
Attending: NURSE PRACTITIONER
Payer: MEDICAID

## 2021-03-10 DIAGNOSIS — Z12.39 BREAST CANCER SCREENING: ICD-10-CM

## 2021-03-10 PROCEDURE — 77067 SCR MAMMO BI INCL CAD: CPT

## 2021-08-13 ENCOUNTER — APPOINTMENT (OUTPATIENT)
Dept: PHYSICAL THERAPY | Age: 51
End: 2021-08-13
Payer: MEDICAID

## 2021-08-16 ENCOUNTER — HOSPITAL ENCOUNTER (OUTPATIENT)
Dept: PHYSICAL THERAPY | Age: 51
Discharge: HOME | End: 2021-08-16
Payer: MEDICAID

## 2021-08-16 PROCEDURE — 97110 THERAPEUTIC EXERCISES: CPT

## 2021-08-16 PROCEDURE — 97161 PT EVAL LOW COMPLEX 20 MIN: CPT

## 2021-08-16 NOTE — PROGRESS NOTES
Adam Nobleymlaina  : 1970  Primary: [employer] [place] [employer]  Secondary:  [address] at [facility]  [address], [address], [place] [address]  [phone]   [phone]      OUTPATIENT PHYSICAL THERAPY: Daily Treatment Note 2021  Visit Count:  1    ICD-10: Treatment Diagnosis: Cervicalgia (M54.2); Radiculopathy, cervical region (M54.12)  Precautions/Allergies:   Insect venom and Penicillin g latex  TREATMENT PLAN:  Effective Dates: 2021 TO 10/15/2021 (60 days). Frequency/Duration: 1 time a week for 60 Day(s) MEDICAL/REFERRING DIAGNOSIS:  Postlaminectomy syndrome, not elsewhere classified [M96.1]  Radiculopathy, cervical region [M54.12]   DATE OF ONSET: Chronic, neck sx in 2018  REFERRING PHYSICIAN: Krissy Tian MD MD Orders: PT eval and tx  Return MD Appointment: 21     Pre-treatment Symptoms/Complaints: L sided cervical pain that runs down her shoulder and arm. Pain: Initial:Pain Intensity 1: 6 Post Session:  6/10   Medications Last Reviewed:  2021  Updated Objective Findings:  See evaluation note from today  TREATMENT:     THERAPEUTIC EXERCISE: (5 minutes):  Exercises per grid below to improve mobility, strength, balance and coordination. Required minimal verbal and tactile cues to promote proper body alignment, promote proper body posture and promote proper body mechanics. Progressed resistance, range, repetitions and complexity of movement as indicated. Date:  21 Date:   Date:     Activity/Exercise Parameters Parameters Parameters   Chin tuck 10x     Scapular retraction 10x in chair     Thoracic extension 5x in chair                             MANUAL THERAPY: (5 minutes): Joint mobilization and Soft tissue mobilization was utilized and necessary because of the patient's restricted joint motion, painful spasm, loss of articular motion and restricted motion of soft tissue.      Date:  21   Parameters   STM to L upper trap in sit Occipital release in supine   MODALITIES: (5 minutes): *  Hot Pack Therapy in order to provide analgesia and relieve muscle spasm. [facility] Portal  Access Code: [email or web address]  URL: [email or web address]  Date: 08/16/2021  Prepared by: Berto Jacobs    Exercises  Seated Scapular Retraction - 1 x daily - 7 x weekly - 2 sets - 10 reps - 5 hold  Seated Thoracic Lumbar Extension with Pectoralis Stretch - 1 x daily - 7 x weekly - 1 sets - 10 reps - 5 hold  Seated Cervical Retraction - 1 x daily - 7 x weekly - 2 sets - 10 reps - 5 hold    Treatment/Session Summary:    · Response to Treatment: Pt had greatly improved ROM post therex and manual.   · Communication/Consultation:  None today  · Equipment provided today:  None today  · Recommendations/Intent for next treatment session: Next visit will focus on progression of functional tasks as tolerated.     Total Treatment Billable Duration:  5 minutes therex, 5 mins manual  PT Patient Time In/Time Out  Time In: 9041  Time Out: [address], PT    Future Appointments   Date Time Provider Jenn Durán   8/23/2021  9:00 AM Amanda Kylee, PT [facility]   8/30/2021  9:00 AM Amanda Kylee, PT [facility]   9/7/2021  9:00 AM Amanda Kylee, PT [facility]   9/13/2021  9:00 AM Amanda Kylee, PT [facility]   9/20/2021  9:00 AM Amanda Kylee, PT [facility]   9/27/2021  9:00 AM Amanda Kylee, PT [facility]   10/4/2021  9:00 AM Amanda Kylee, PT [facility]   1/20/2022  8:30 AM MAT [facility]   1/27/2022 10:00 AM DARRICK Alcaraz [facility]

## 2021-08-16 NOTE — THERAPY EVALUATION
Shaggy Ornelas  : 1970  Payor: Shira Brito / Plan: Ketan Whitmore / Product Type: Managed Care Medicaid /    [facility] [place]  at [facility]  [address], [address], [place] [address]  [phone]   [phone]         OUTPATIENT PHYSICAL THERAPY:Initial Assessment 2021    ICD-10: Treatment Diagnosis: Cervicalgia (M54.2); Radiculopathy, cervical region (M54.12)  Precautions/Allergies:   Insect venom and Penicillin g latex  TREATMENT PLAN:  Effective Dates: 2021 TO 10/15/2021 (60 days). Frequency/Duration: 1 time a week for 60 Day(s) MEDICAL/REFERRING DIAGNOSIS:  Postlaminectomy syndrome, not elsewhere classified [M96.1]  Radiculopathy, cervical region [M54.12]   DATE OF ONSET: Chronic, neck sx in 2018  REFERRING PHYSICIAN: Ofe Caballero MD MD Orders: PT eval and tx  Return MD Appointment: 21     INITIAL ASSESSMENT:  Ms. Patricio Hood presents to PT eval w/ c/o neck pain w/ radicular symptoms that are chronic in nature. She displayed gross deficits in cervical ROM and strength along w/ pain and weakness w/ shoulder motion. Additionally, her posture is poor at this time as she displays thoracic kyphosis and forward head. She will benefit from continued skilled PT services to improve her deficits listed below and to progress towards her PLOF. PROBLEM LIST (Impacting functional limitations):  1. Decreased Strength  2. Decreased ADL/Functional Activities  3. Decreased Transfer Abilities  4. Decreased Ambulation Ability/Technique  5. Decreased Balance  6. Increased Pain  7. Decreased Activity Tolerance  8. Decreased Flexibility/Joint Mobility  9. Decreased [place] with Home Exercise Program INTERVENTIONS PLANNED:  1. Balance Exercise  2. Cold  3. Electrical Stimulation  4. Heat  5. Home Exercise Program (HEP)  6. Manual Therapy  7. Range of Motion (ROM)  8.  Therapeutic Exercise/Strengthening   GOALS: (Goals have been discussed and agreed upon with patient.)  Short-Term Functional Goals: Time Frame: 4 weeks  1. Pt will be independent w/ HEP to improve outcomes and decrease pain levels. 2. Pt will have cervical rotation ROM of 55* or more in order to increase safety while driving w/ pain of 8/65 or less. 3. Pt will report pain of 4/10 or less while performing ADLS in order to return to PLOF. Discharge Goals: Time Frame: 6 weeks  1. Pt will have NDI score of 18 or less showing decreased pain and decreased functional impairments. 2. Pt will have cervical lateral bending ROM of 30* or more in order to increase safety while driving w/ pain of 9/81 or less. 3. Pt will report decrease in headaches by 25% in order to show improved symptoms and pain. Outcome Measure: Tool Used: Neck Disability Index (NDI)  Score:  Initial: 23/50  Most Recent: X/50 (Date: -- )   Interpretation of Score: The Neck Disability Index is a revised form of the Oswestry Low Back Pain Index and is designed to measure the activities of daily living in person's with neck pain. Each section is scored on a 0-5 scale, 5 representing the greatest disability. The scores of each section are added together for a total score of 50. Medical Necessity:   · Patient is expected to demonstrate progress in strength, range of motion, balance and coordination to increase independence with functional tasks. Reason for Services/Other Comments:  · Patient continues to demonstrate capacity to improve strength, ROM, balance, mobility which will increase independence. Total Duration: 17 min eval, 5 mins therex, 5 mins manual, 5 mins modalities  PT Patient Time In/Time Out  Time In: 0918  Time Out: 0950    Rehabilitation Potential For Stated Goals: Good  Regarding Kelin Ornelas's therapy, I certify that the treatment plan above will be carried out by a therapist or under their direction.   Thank you for this referral,  Uyen Chase, PT     Referring Physician Signature: Hailey Benjamine Lesch, MD              Date                    The information in this section was collected on 21 (except where otherwise noted). HISTORY:   History of Present Injury/Illness (Reason for Referral):  Chronic neck pain. Had sx in 2018. Notes she has tried PT before and it didn't help a lot. Past Medical History/Comorbidities:   Ms. Joaquina Connolly  has a past medical history of Anxiety, Arthritis, Bilateral carpal tunnel syndrome (2020), Chronic obstructive pulmonary disease ([facility] [address] Chronic pain, Contact dermatitis and other eczema, due to unspecified cause, Diabetes ([address] Fibroids, Hypercholesterolemia, Hypertension, and Trauma. Ms. Joaquina Connolly  has a past surgical history that includes hx  section; hx myomectomy; colonoscopy (N/A, 9/3/2019); and hx carpal tunnel release (Bilateral, 2021). Social History/Living Environment:     Lives w/ family in [place]. Has stairs   Prior Level of Function/Work/Activity:  Independent, not working      Ambulatory/Rehab Services H2 Model Falls Risk Assessment    Risk Factors:       No Risk Factors Identified Ability to Rise from Chair:       (0)  Ability to rise in a single movement    Falls Prevention Plan:       No modifications necessary   Total: (5 or greater = High Risk): 0     [facility] of [employer]. All Rights Reserved. [facility] Patent #[id number]. Federal Law prohibits the replication, distribution or use without written permission from [facility] [employer]     Current Medications:      Current Outpatient Medications:     pantoprazole (PROTONIX) 40 mg tablet, Take 40 mg by mouth daily. , Disp: , Rfl:     ascorbic acid, vitamin C, (VITAMIN C) 500 mg tablet, Take  by mouth., Disp: , Rfl:     erythromycin (ILOTYCIN) ophthalmic ointment, Apply thin layer to lower right eyelid BID for 7 days. , Disp: 1 Tube, Rfl: 0    metroNIDAZOLE (FLAGYL) 500 mg tablet, Take 1 Tablet by mouth two (2) times a day., Disp: 10 Tablet, Rfl: 0    fluconazole (DIFLUCAN) 150 mg tablet, Take 1 tablet and repeat second tablet in 4 days, Disp: 2 Tablet, Rfl: 2    amoxicillin (AMOXIL) 875 mg tablet, Take 1 Tablet by mouth two (2) times a day., Disp: 20 Tablet, Rfl: 0    ergocalciferol, vitamin D2, 50 mcg (2,000 unit) tab, Take 1 Capsule by mouth daily. , Disp: , Rfl:     amitriptyline (ELAVIL) 25 mg tablet, Take 25 mg by mouth nightly., Disp: , Rfl:     methocarbamoL (ROBAXIN) 500 mg tablet, Take 500 mg by mouth three (3) times daily. , Disp: , Rfl:     atenoloL (TENORMIN) 25 mg tablet, TAKE 1 TABLET BY MOUTH EVERY DAY, Disp: 30 Tab, Rfl: 11    levothyroxine (SYNTHROID) 25 mcg tablet, TAKE 1 TABLET BY MOUTH EVERY DAY BEFORE BREAKFAST, Disp: 30 Tab, Rfl: 11    metFORMIN (GLUCOPHAGE) 500 mg tablet, TAKE 1 TABLET BY MOUTH TWICE A DAY, Disp: 60 Tab, Rfl: 11    baclofen (LIORESAL) 10 mg tablet, Take 1 Tab by mouth three (3) times daily. , Disp: 90 Tab, Rfl: 1    OTHER, Dispense standard commode. Dx: impaired mobility, chronic back pain., Disp: 1 Act, Rfl: 0    OTHER, Dispense single High Commode Seat. Dx: chronic back pain with sciatica, DDD, Disp: 1 Act, Rfl: 0    simvastatin (ZOCOR) 20 mg tablet, Take 1 Tab by mouth nightly., Disp: 90 Tab, Rfl: 3    cetirizine (ZYRTEC) 10 mg tablet, TAKE 1 TABLET BY MOUTH EVERY DAY AT NIGHT, Disp: 30 Tab, Rfl: 2    multivitamin (ONE A DAY) tablet, Take 1 Tab by mouth daily. , Disp: , Rfl:     Blood-Glucose Meter monitoring kit, Check fasting BS daily. Dx: T2DM, Disp: 1 Kit, Rfl: 0    glucose blood VI test strips (ASCENSIA AUTODISC VI, ONE TOUCH ULTRA TEST VI) strip, Check fasting BS daily. Dx: T2DM, Disp: 100 Strip, Rfl: 2    lancets misc, Check fasting BS daily. Dx: T2DM, Disp: 1 Each, Rfl: 11    ergocalciferol, vitamin D2, 2,000 unit tab, Take 1 Cap by mouth., Disp: , Rfl:     aspirin delayed-release 81 mg tablet, Take  by mouth daily. , Disp: , Rfl:    Date Last Reviewed:  8/16/2021    Number of Personal Factors/Comorbidities that affect the Plan of Care: 1-2: MODERATE COMPLEXITY   EXAMINATION:   Observation/Orthostatic Postural Assessment: Forward head, raised shoulders, rounded thoracic spine and shoulders  Palpation:          Tender to B occipitals, capitus, upper trap, and levator muscles  Special Tests:          Spurlings: +   Sensation:         Intact per patient report    Joint/Muscle ROM Strength Updates   Cervical rotation 35* R, 30*L Decreased     Cervical side bending 20* R, 25* L Decreased     Cervical flexion 20*  Decreased    Cervical extension 25* Decreased    Shoulder flexion WFL but painful Decreased    Shoulder abduction WFL but painful Decreased    Shoulder ER WFL but painful Decreased    Shoulder IR WFL but painful Decreased          Body Structures Involved:  1. Nerves  2. Bones  3. Joints  4. Muscles  5. Ligaments Body Functions Affected:  1. Mental  2. Sensory/Pain  3. Neuromusculoskeletal  4. Movement Related Activities and Participation Affected:  1. General Tasks and Demands  2. Mobility  3. Self Care  4. Domestic Life  5. Interpersonal Interactions and Relationships  6. Community, Social and [place]   Number of elements (examined above) that affect the Plan of Care: 3: MODERATE COMPLEXITY   CLINICAL PRESENTATION:   Presentation: Stable and uncomplicated: LOW COMPLEXITY   CLINICAL DECISION MAKING:      Use of outcome tool(s) and clinical judgement create a POC that gives a: Clear prediction of patient's progress: LOW COMPLEXITY              Pain: Initial:   Pain Intensity 1: 6  Post Session:  6/10     [employer] Portal  Access Code: [email or web address]  URL: [email or web address]  Date: 08/16/2021  Prepared by: Anh Banuelos    Exercises  Seated Scapular Retraction - 1 x daily - 7 x weekly - 2 sets - 10 reps - 5 hold  Seated Thoracic Lumbar Extension with Pectoralis Stretch - 1 x daily - 7 x weekly - 1 sets - 10 reps - 5 hold  Seated Cervical Retraction - 1 x daily - 7 x weekly - 2 sets - 10 reps - 5 hold    Variance from POC: none    Antonia Chávez, PT

## 2021-08-20 ENCOUNTER — HOSPITAL ENCOUNTER (OUTPATIENT)
Dept: GENERAL RADIOLOGY | Age: 51
Discharge: HOME | End: 2021-08-20
Payer: MEDICAID

## 2021-08-20 DIAGNOSIS — M51.36 DDD (DEGENERATIVE DISC DISEASE), LUMBAR: ICD-10-CM

## 2021-08-20 DIAGNOSIS — M54.50 ACUTE BILATERAL LOW BACK PAIN WITHOUT SCIATICA: ICD-10-CM

## 2021-08-20 PROCEDURE — 72110 X-RAY EXAM L-2 SPINE 4/>VWS: CPT

## 2021-08-23 ENCOUNTER — HOSPITAL ENCOUNTER (OUTPATIENT)
Dept: PHYSICAL THERAPY | Age: 51
Discharge: HOME | End: 2021-08-23
Payer: MEDICAID

## 2021-08-23 PROCEDURE — 97110 THERAPEUTIC EXERCISES: CPT

## 2021-08-23 PROCEDURE — 97012 MECHANICAL TRACTION THERAPY: CPT

## 2021-08-23 NOTE — PROGRESS NOTES
Please let patient know that her x-ray shows progressive degeneration of the lumbar spine, she can proceed with the MRI of her spine as indicated. Follow up after mri to discuss findings.

## 2021-08-23 NOTE — PROGRESS NOTES
Placed call to pt and LVM requesting return call or to view message on my chart for imaging results. Left call back number.

## 2021-08-23 NOTE — PROGRESS NOTES
Kelin Britton Realvelvet  : 1970  Primary: Rosalina Vance Medicaid  Secondary:  [address] at [facility]  [address], [address], [place] [address]  [phone]   [phone]      OUTPATIENT PHYSICAL THERAPY: Daily Treatment Note 2021  Visit Count:  2    ICD-10: Treatment Diagnosis: Cervicalgia (M54.2); Radiculopathy, cervical region (M54.12)  Precautions/Allergies:   Insect venom and Penicillin g latex  TREATMENT PLAN:  Effective Dates: 2021 TO 10/15/2021 (60 days). Frequency/Duration: 1 time a week for 60 Day(s) MEDICAL/REFERRING DIAGNOSIS:  Postlaminectomy syndrome, not elsewhere classified [M96.1]  Radiculopathy, cervical region [M54.12]   DATE OF ONSET: Chronic, neck sx in 2018  REFERRING PHYSICIAN: Sophia Guerra MD MD Orders: PT eval and tx  Return MD Appointment: 21     Pre-treatment Symptoms/Complaints: L sided cervical pain that runs down her shoulder and arm has not changed. Exercises didn't help in patient's opinion. Pain: Initial:Pain Intensity 1: 6 Post Session:  6/10   Medications Last Reviewed:  2021  Updated Objective Findings:  None Today  TREATMENT:     THERAPEUTIC EXERCISE: (30 minutes):  Exercises per grid below to improve mobility, strength, balance and coordination. Required minimal verbal and tactile cues to promote proper body alignment, promote proper body posture and promote proper body mechanics. Progressed resistance, range, repetitions and complexity of movement as indicated.      Date:  21 Date:   Date:     Activity/Exercise Parameters Parameters Parameters   UBE  3/3 1.0    Chin tuck 10x     Scapular retraction 10x in chair     Thoracic extension 5x in chair     Rows  2x10 RTB seated in chair    Ts  2x10 RTB seated in chair    Ms  2x10 RTB seated in chair    Lateral raise  20x seated    Forward raise  20x seated    Shrug  20x seated           MANUAL THERAPY: (0 minutes): Joint mobilization and Soft tissue mobilization was utilized and necessary because of the patient's restricted joint motion, painful spasm, loss of articular motion and restricted motion of soft tissue. Date:  8/16/21   Parameters   STM to L upper trap in sit   Occipital release in supine   MECHANICAL TRACTION (10 mins) - 14# mobile unit to increase motion and decrease pain/headaches    MODALITIES: (5 minutes): *  Hot Pack Therapy in order to provide analgesia and relieve muscle spasm. [employer] Portal  Access Code: [email or web address]  URL: [email or web address]  Date: 08/16/2021  Prepared by: Meek Reis    Exercises  Seated Scapular Retraction - 1 x daily - 7 x weekly - 2 sets - 10 reps - 5 hold  Seated Thoracic Lumbar Extension with Pectoralis Stretch - 1 x daily - 7 x weekly - 1 sets - 10 reps - 5 hold  Seated Cervical Retraction - 1 x daily - 7 x weekly - 2 sets - 10 reps - 5 hold    Treatment/Session Summary:    · Response to Treatment: Pt's ROM was still improved from last session but her pain was unchanged. Added more strengthening this session and mechanical traction. Some reduced tightness/headache after traction. · Communication/Consultation:  None today  · Equipment provided today:  None today  · Recommendations/Intent for next treatment session: Next visit will focus on progression of functional tasks as tolerated.     Total Treatment Billable Duration:  30 minutes therex, 10 mins mechanical traction  PT Patient Time In/Time Out  Time In: 5781  Time Out: 225 [facility],     Future Appointments   Date Time Provider Jenn Durán   8/30/2021  9:00 AM Ct Postin, PT [facility]   9/7/2021  9:00 AM Ct Postin[facility]   9/13/2021  9:00 AM Ct [facility]   9/20/2021  9:00 AM Ct Postin, [facility]   9/27/2021  9:00 AM Ct Postin, [facility]   10/4/2021  9:00 AM Ct Postin, PT [facility] 1/20/2022  8:30 AM MAT LAB [facility]   1/27/2022 10:00 AM DARRICK Silva [facility]

## 2021-08-27 ENCOUNTER — HOSPITAL ENCOUNTER (OUTPATIENT)
Dept: GENERAL RADIOLOGY | Age: 51
Discharge: HOME | End: 2021-08-27
Payer: MEDICAID

## 2021-08-27 PROCEDURE — 72040 X-RAY EXAM NECK SPINE 2-3 VW: CPT

## 2021-08-30 ENCOUNTER — HOSPITAL ENCOUNTER (OUTPATIENT)
Dept: PHYSICAL THERAPY | Age: 51
Discharge: HOME | End: 2021-08-30
Payer: MEDICAID

## 2021-08-30 PROCEDURE — 97140 MANUAL THERAPY 1/> REGIONS: CPT

## 2021-08-30 PROCEDURE — 97110 THERAPEUTIC EXERCISES: CPT

## 2021-08-30 NOTE — PROGRESS NOTES
Maia Ornelas  : 1970  Primary: Asya Crumble Medicaid  Secondary:  [address] at [address]  [place] [address], [address], [place] [address]  [phone]   [phone]      OUTPATIENT PHYSICAL THERAPY: Daily Treatment Note 2021  Visit Count:  3    ICD-10: Treatment Diagnosis: Cervicalgia (M54.2); Radiculopathy, cervical region (M54.12)  Precautions/Allergies:   Insect venom and Penicillin g latex  TREATMENT PLAN:  Effective Dates: 2021 TO 10/15/2021 (60 days). Frequency/Duration: 1 time a week for 60 Day(s) MEDICAL/REFERRING DIAGNOSIS:  Postlaminectomy syndrome, not elsewhere classified [M96.1]  Radiculopathy, cervical region [M54.12]   DATE OF ONSET: Chronic, neck sx in 2018  REFERRING PHYSICIAN: Elayne Bartholomew MD MD Orders: PT eval and tx  Return MD Appointment: 21     Pre-treatment Symptoms/Complaints: L sided cervical pain that runs down her shoulder and arm has not changed. Was sore after last session. Pain: Initial:Pain Intensity 1: 7 Post Session:  4/10   Medications Last Reviewed:  2021  Updated Objective Findings:  None Today  TREATMENT:     THERAPEUTIC EXERCISE: (25 minutes):  Exercises per grid below to improve mobility, strength, balance and coordination. Required minimal verbal and tactile cues to promote proper body alignment, promote proper body posture and promote proper body mechanics. Progressed resistance, range, repetitions and complexity of movement as indicated.      Date:  21 Date:   Date:  21   Activity/Exercise Parameters Parameters Parameters   UBE  3/3 1.0 4/4 2.0   Chin tuck 10x     Scapular retraction 10x in chair     Thoracic extension 5x in chair     Rows  2x10 RTB seated in chair 2x10 RTB seated in chair   Ts  2x10 RTB seated in chair 2x10 YTB in hands seated in chair   Ms  2x10 RTB seated in chair 2x10 RTB seated in chair    Lateral raise  20x seated    Forward raise  20x seated    Shrug  20x seated Pulleys   15x flexion    Standing prayer stretch   At table 10x   MANUAL THERAPY: (15 minutes): Joint mobilization and Soft tissue mobilization was utilized and necessary because of the patient's restricted joint motion, painful spasm, loss of articular motion and restricted motion of soft tissue. Date:  8/16/21 Date:  8/30/21   Parameters Parameters   STM to L upper trap in sit STM to B upper trap and occipitals in sit with handle bar   Occipital release in supine STM to L scalenes   MECHANICAL TRACTION (0 mins) - 14# mobile unit to increase motion and decrease pain/headaches    MODALITIES: (5 minutes): *  Hot Pack Therapy in order to provide analgesia and relieve muscle spasm. [email or web address] Portal  Access Code: [email or web address]  URL: [email or web address]  Date: 08/16/2021  Prepared by: Malka Murphyer    Exercises  Seated Scapular Retraction - 1 x daily - 7 x weekly - 2 sets - 10 reps - 5 hold  Seated Thoracic Lumbar Extension with Pectoralis Stretch - 1 x daily - 7 x weekly - 1 sets - 10 reps - 5 hold  Seated Cervical Retraction - 1 x daily - 7 x weekly - 2 sets - 10 reps - 5 hold    Treatment/Session Summary:    · Response to Treatment: Pt had no issues w/ therex. Her ROM improved and her pain improved post manual.   · Communication/Consultation:  None today  · Equipment provided today:  None today  · Recommendations/Intent for next treatment session: Next visit will focus on progression of functional tasks as tolerated.     Total Treatment Billable Duration:  25 minutes therex, 15 mins manual   PT Patient Time In/Time Out  Time In: 0900  Time Out: [address], PT    Future Appointments   Date Time Provider Jenn Durán   9/7/2021  9:00 AM Shane Burn, PT [facility]   9/13/2021  9:00 AM Shane Burn, PT [facility]   9/17/2021  1:15 PM SFE MRI UNIT 1 [facility]   9/20/2021  9:00 AM Shane Burn, PT [facility]   9/27/2021  9:00 AM Rachna Bradley Silva [facility]   10/4/2021  9:00 AM [facility]   1/20/2022  8:30 AM MAT LAB [facility]   1/27/2022 10:00 AM Yenni Bryant NP [facility]

## 2021-09-07 ENCOUNTER — HOSPITAL ENCOUNTER (OUTPATIENT)
Dept: PHYSICAL THERAPY | Age: 51
Discharge: HOME | End: 2021-09-07

## 2021-09-07 NOTE — PROGRESS NOTES
Jacque Ornelas  : 1970  Primary: [place]  Secondary:  [facility] [place] [address], [address], [place] [address]  [phone]   [phone]        OUTPATIENT DAILY NOTE    NAME/AGE/GENDER: Rigoberto Hodge is a 44 y.o. female. DATE: 2021    Ms. Ornelas CANCELED for today's appointment due to illness.     Kirsten Spencer, PT

## 2021-09-13 ENCOUNTER — HOSPITAL ENCOUNTER (OUTPATIENT)
Dept: PHYSICAL THERAPY | Age: 51
Discharge: HOME | End: 2021-09-13

## 2021-09-13 NOTE — THERAPY EVALUATION
Gisell Ng Johnson  : 1970  Payor: Alberto Garcia / Plan: Priscilla Breed / Product Type: Managed Care Medicaid /    [facility] [place]  at [facility]  [address], [address], [place] [address]  [phone]   [phone]         OUTPATIENT PHYSICAL [facility] 2021    ICD-10: Treatment Diagnosis: Cervicalgia (M54.2); Radiculopathy, cervical region (M54.12)  Precautions/Allergies:   Insect venom and Penicillin g latex  TREATMENT PLAN:  Effective Dates: 2021 TO 10/15/2021 (60 days). Frequency/Duration: 1 time a week for 60 Day(s) MEDICAL/REFERRING DIAGNOSIS:  Postlaminectomy syndrome, not elsewhere classified [M96.1]  Radiculopathy, cervical region [M54.12]   DATE OF ONSET: Chronic, neck sx in 2018  REFERRING PHYSICIAN: Laura Valiente MD MD Orders: PT eval and tx  Return MD Appointment: 21     INITIAL ASSESSMENT:  Ms. Anjel Egan presents to PT eval w/ c/o neck pain w/ radicular symptoms that are chronic in nature. She displayed gross deficits in cervical ROM and strength along w/ pain and weakness w/ shoulder motion. Additionally, her posture is poor at this time as she displays thoracic kyphosis and forward head. She will benefit from continued skilled PT services to improve her deficits listed below and to progress towards her PLOF. Discharge Assessment: Ms. Anjel Egan presented to PT eval w/ c/o neck pain w/ radicular symptoms that are chronic in nature. She attended 3 total PT visits and cancelled 2x. Ms. Anjel Egan asked to be discharged as she doesn't feel PT is helping her neck. Thank you for this referral.      GOALS: (Goals have been discussed and agreed upon with patient.)  Short-Term Functional Goals: Time Frame: 4 weeks  1. Pt will be independent w/ HEP to improve outcomes and decrease pain levels. Not met  2. Pt will have cervical rotation ROM of 55* or more in order to increase safety while driving w/ pain of 9/50 or less. Not met  3.  Pt will report pain of 4/10 or less while performing ADLS in order to return to PLOF. Not met    Discharge Goals: Time Frame: 6 weeks  1. Pt will have NDI score of 18 or less showing decreased pain and decreased functional impairments. Not met  2. Pt will have cervical lateral bending ROM of 30* or more in order to increase safety while driving w/ pain of 8/66 or less. Not met  3. Pt will report decrease in headaches by 25% in order to show improved symptoms and pain. Not met    Outcome Measure: Tool Used: Neck Disability Index (NDI)  Score:  Initial:   Most Recent:  (Date: -- )   Interpretation of Score: The Neck Disability Index is a revised form of the Oswestry Low Back Pain Index and is designed to measure the activities of daily living in person's with neck pain. Each section is scored on a 0-5 scale, 5 representing the greatest disability. The scores of each section are added together for a total score of 50. Rehabilitation Potential For Stated Goals: Good  Regarding Gail Ornelas's therapy, I certify that the treatment plan above will be carried out by a therapist or under their direction. Thank you for this referral,  Pedro Roca, PT     Referring Physician Signature: Stephanie Wills MD             The information in this section was collected on 21 (except where otherwise noted). HISTORY:   History of Present Injury/Illness (Reason for Referral):  Chronic neck pain. Had sx in 2018. Notes she has tried PT before and it didn't help a lot. Past Medical History/Comorbidities:   Ms. Miguel Osborn  has a past medical history of Anxiety, Arthritis, Bilateral carpal tunnel syndrome (2020), Chronic obstructive pulmonary disease ([address] Chronic pain, Contact dermatitis and other eczema, due to unspecified cause, Diabetes ([address] Fibroids, Hypercholesterolemia, Hypertension, and Trauma.   Ms. Miguel Osborn  has a past surgical history that includes hx  section; hx myomectomy; colonoscopy (N/A, 9/3/2019); and hx carpal tunnel release (Bilateral, 02/23/2021). Social History/Living Environment:     Lives w/ family in [place]. Has stairs   Prior Level of Function/Work/Activity:  Independent, not working      Ambulatory/Rehab Services H2 Model Falls Risk Assessment    Risk Factors:       No Risk Factors Identified Ability to Rise from Chair:       (0)  Ability to rise in a single movement    Falls Prevention Plan:       No modifications necessary   Total: (5 or greater = High Risk): 0    ©2010 [facility] [employer]. All Rights Reserved. [facility] Patent #[id number]. Federal Law prohibits the replication, distribution or use without written permission from [facility]     Current Medications:      Current Outpatient Medications:     simvastatin (ZOCOR) 20 mg tablet, Take 1 Tablet by mouth nightly., Disp: 90 Tablet, Rfl: 3    pantoprazole (PROTONIX) 40 mg tablet, Take 40 mg by mouth daily. , Disp: , Rfl:     ascorbic acid, vitamin C, (VITAMIN C) 500 mg tablet, Take  by mouth., Disp: , Rfl:     erythromycin (ILOTYCIN) ophthalmic ointment, Apply thin layer to lower right eyelid BID for 7 days. , Disp: 1 Tube, Rfl: 0    metroNIDAZOLE (FLAGYL) 500 mg tablet, Take 1 Tablet by mouth two (2) times a day., Disp: 10 Tablet, Rfl: 0    fluconazole (DIFLUCAN) 150 mg tablet, Take 1 tablet and repeat second tablet in 4 days, Disp: 2 Tablet, Rfl: 2    amoxicillin (AMOXIL) 875 mg tablet, Take 1 Tablet by mouth two (2) times a day., Disp: 20 Tablet, Rfl: 0    ergocalciferol, vitamin D2, 50 mcg (2,000 unit) tab, Take 1 Capsule by mouth daily. , Disp: , Rfl:     amitriptyline (ELAVIL) 25 mg tablet, Take 25 mg by mouth nightly., Disp: , Rfl:     methocarbamoL (ROBAXIN) 500 mg tablet, Take 500 mg by mouth three (3) times daily. , Disp: , Rfl:     atenoloL (TENORMIN) 25 mg tablet, TAKE 1 TABLET BY MOUTH EVERY DAY, Disp: 30 Tab, Rfl: 11    levothyroxine (SYNTHROID) 25 mcg tablet, TAKE 1 TABLET BY MOUTH EVERY DAY BEFORE BREAKFAST, Disp: 30 Tab, Rfl: 11    metFORMIN (GLUCOPHAGE) 500 mg tablet, TAKE 1 TABLET BY MOUTH TWICE A DAY, Disp: 60 Tab, Rfl: 11    baclofen (LIORESAL) 10 mg tablet, Take 1 Tab by mouth three (3) times daily. , Disp: 90 Tab, Rfl: 1    OTHER, Dispense standard commode. Dx: impaired mobility, chronic back pain., Disp: 1 Act, Rfl: 0    OTHER, Dispense single High Commode Seat. Dx: chronic back pain with sciatica, DDD, Disp: 1 Act, Rfl: 0    cetirizine (ZYRTEC) 10 mg tablet, TAKE 1 TABLET BY MOUTH EVERY DAY AT NIGHT, Disp: 30 Tab, Rfl: 2    multivitamin (ONE A DAY) tablet, Take 1 Tab by mouth daily. , Disp: , Rfl:     Blood-Glucose Meter monitoring kit, Check fasting BS daily. Dx: T2DM, Disp: 1 Kit, Rfl: 0    glucose blood VI test strips (ASCENSIA AUTODISC VI, ONE TOUCH ULTRA TEST VI) strip, Check fasting BS daily. Dx: T2DM, Disp: 100 Strip, Rfl: 2    lancets misc, Check fasting BS daily. Dx: T2DM, Disp: 1 Each, Rfl: 11    ergocalciferol, vitamin D2, 2,000 unit tab, Take 1 Cap by mouth., Disp: , Rfl:     aspirin delayed-release 81 mg tablet, Take  by mouth daily. , Disp: , Rfl:    Date Last Reviewed:  9/13/2021    EXAMINATION:   Observation/Orthostatic Postural Assessment: Forward head, raised shoulders, rounded thoracic spine and shoulders  Palpation:          Tender to B occipitals, capitus, upper trap, and levator muscles  Special Tests:          Spurlings: +   Sensation:         Intact per patient report    Joint/Muscle ROM Strength Updates   Cervical rotation 35* R, 30*L Decreased     Cervical side bending 20* R, 25* L Decreased     Cervical flexion 20*  Decreased    Cervical extension 25* Decreased    Shoulder flexion WFL but painful Decreased    Shoulder abduction WFL but painful Decreased    Shoulder ER WFL but painful Decreased    Shoulder IR WFL but painful Decreased          Body Structures Involved:  1. Nerves  2. Bones  3. Joints  4. Muscles  5.  Ligaments Body Functions Affected:  1. Mental  2. Sensory/Pain  3. Neuromusculoskeletal  4. Movement Related Activities and Participation Affected:  1. General Tasks and Demands  2. Mobility  3. Self Care  4. Domestic Life  5. Interpersonal Interactions and Relationships  6. Community, Social and Civic Life   CLINICAL PRESENTATION:   CLINICAL DECISION MAKING:                   [email or web address]  Access Code: [email or web address]  URL: [email or web address]  Date: 08/16/2021  Prepared by: Ashely Brock    Exercises  Seated Scapular Retraction - 1 x daily - 7 x weekly - 2 sets - 10 reps - 5 hold  Seated Thoracic Lumbar Extension with Pectoralis Stretch - 1 x daily - 7 x weekly - 1 sets - 10 reps - 5 hold  Seated Cervical Retraction - 1 x daily - 7 x weekly - 2 sets - 10 reps - 5 hold    Variance from POC: none    Ashely Brock, PT

## 2021-09-20 ENCOUNTER — APPOINTMENT (OUTPATIENT)
Dept: PHYSICAL THERAPY | Age: 51
End: 2021-09-20

## 2021-09-27 ENCOUNTER — APPOINTMENT (OUTPATIENT)
Dept: PHYSICAL THERAPY | Age: 51
End: 2021-09-27

## 2021-09-29 ENCOUNTER — HOSPITAL ENCOUNTER (OUTPATIENT)
Dept: MRI IMAGING | Age: 51
Discharge: HOME | End: 2021-09-29
Attending: NURSE PRACTITIONER
Payer: MEDICAID

## 2021-09-29 DIAGNOSIS — M54.16 LUMBAR BACK PAIN WITH RADICULOPATHY AFFECTING LOWER EXTREMITY: ICD-10-CM

## 2021-09-29 PROCEDURE — 72148 MRI LUMBAR SPINE W/O DYE: CPT

## 2021-09-29 NOTE — PROGRESS NOTES
Please inform patient that her MRI shows mild arthritis without any significant stenosis or other acute findings. Treatment for this includes PT and possible spinal injections. I can refer to pain management or she can return to orthopedic to discuss options.

## 2021-09-30 NOTE — PROGRESS NOTES
LMOVM informing pt, per Dr. Ricky Smith, MRI shows mild arthritis without any significant stenosis or other acute findings. Treatment for this includes PT and possible spinal injections. I can refer to pain management or she can return to orthopedic to discuss options. Pt is to call back and let us know if she wants to be referred to pain management or return to Orthopedist to discuss options.

## 2021-10-04 ENCOUNTER — APPOINTMENT (OUTPATIENT)
Dept: PHYSICAL THERAPY | Age: 51
End: 2021-10-04

## 2021-10-29 ENCOUNTER — HOSPITAL ENCOUNTER (OUTPATIENT)
Dept: MRI IMAGING | Age: 51
Discharge: HOME | End: 2021-10-29
Attending: NURSE PRACTITIONER
Payer: MEDICAID

## 2021-10-29 DIAGNOSIS — G89.29 CHRONIC BILATERAL THORACIC BACK PAIN: ICD-10-CM

## 2021-10-29 DIAGNOSIS — M54.6 CHRONIC BILATERAL THORACIC BACK PAIN: ICD-10-CM

## 2021-10-29 DIAGNOSIS — R29.898 WEAKNESS OF LEFT LEG: ICD-10-CM

## 2021-10-29 PROCEDURE — 72146 MRI CHEST SPINE W/O DYE: CPT

## 2022-03-02 ENCOUNTER — NURSE TRIAGE (OUTPATIENT)
Dept: OTHER | Facility: CLINIC | Age: 52
End: 2022-03-02

## 2022-03-02 ENCOUNTER — HOSPITAL ENCOUNTER (EMERGENCY)
Age: 52
Discharge: HOME | End: 2022-03-02
Attending: EMERGENCY MEDICINE
Payer: MEDICAID

## 2022-03-02 ENCOUNTER — APPOINTMENT (OUTPATIENT)
Dept: GENERAL RADIOLOGY | Age: 52
End: 2022-03-02
Attending: PHYSICIAN ASSISTANT
Payer: MEDICAID

## 2022-03-02 VITALS
WEIGHT: 193 LBS | HEART RATE: 60 BPM | DIASTOLIC BLOOD PRESSURE: 88 MMHG | OXYGEN SATURATION: 94 % | RESPIRATION RATE: 18 BRPM | HEIGHT: 66 IN | SYSTOLIC BLOOD PRESSURE: 151 MMHG | BODY MASS INDEX: 31.02 KG/M2 | TEMPERATURE: 98.4 F

## 2022-03-02 DIAGNOSIS — J01.90 ACUTE SINUSITIS, RECURRENCE NOT SPECIFIED, UNSPECIFIED LOCATION: ICD-10-CM

## 2022-03-02 DIAGNOSIS — Z77.098 CHEMICAL EXPOSURE: Primary | ICD-10-CM

## 2022-03-02 LAB
ALBUMIN SERPL-MCNC: 4.3 G/DL (ref 3.5–5)
ALBUMIN/GLOB SERPL: 1 {RATIO} (ref 1.2–3.5)
ALP SERPL-CCNC: 56 U/L (ref 50–130)
ALT SERPL-CCNC: 45 U/L (ref 12–65)
ANION GAP SERPL CALC-SCNC: 6 MMOL/L (ref 7–16)
AST SERPL-CCNC: 21 U/L (ref 15–37)
BASOPHILS # BLD: 0.1 K/UL (ref 0–0.2)
BASOPHILS NFR BLD: 1 % (ref 0–2)
BILIRUB SERPL-MCNC: 0.4 MG/DL (ref 0.2–1.1)
BUN SERPL-MCNC: 8 MG/DL (ref 6–23)
CALCIUM SERPL-MCNC: 9.3 MG/DL (ref 8.3–10.4)
CHLORIDE SERPL-SCNC: 105 MMOL/L (ref 98–107)
CO2 SERPL-SCNC: 30 MMOL/L (ref 21–32)
COVID-19 RAPID TEST, COVR: NOT DETECTED
CREAT SERPL-MCNC: 1.07 MG/DL (ref 0.6–1)
DIFFERENTIAL METHOD BLD: NORMAL
EOSINOPHIL # BLD: 0.1 K/UL (ref 0–0.8)
EOSINOPHIL NFR BLD: 2 % (ref 0.5–7.8)
ERYTHROCYTE [DISTWIDTH] IN BLOOD BY AUTOMATED COUNT: 13.8 % (ref 11.9–14.6)
GLOBULIN SER CALC-MCNC: 4.1 G/DL (ref 2.3–3.5)
GLUCOSE SERPL-MCNC: 117 MG/DL (ref 65–100)
HCT VFR BLD AUTO: 42.1 % (ref 35.8–46.3)
HGB BLD-MCNC: 13.6 G/DL (ref 11.7–15.4)
IMM GRANULOCYTES # BLD AUTO: 0 K/UL (ref 0–0.5)
IMM GRANULOCYTES NFR BLD AUTO: 0 % (ref 0–5)
LYMPHOCYTES # BLD: 2.6 K/UL (ref 0.5–4.6)
LYMPHOCYTES NFR BLD: 32 % (ref 13–44)
MCH RBC QN AUTO: 30.7 PG (ref 26.1–32.9)
MCHC RBC AUTO-ENTMCNC: 32.3 G/DL (ref 31.4–35)
MCV RBC AUTO: 95 FL (ref 79.6–97.8)
MONOCYTES # BLD: 0.6 K/UL (ref 0.1–1.3)
MONOCYTES NFR BLD: 7 % (ref 4–12)
NEUTS SEG # BLD: 4.7 K/UL (ref 1.7–8.2)
NEUTS SEG NFR BLD: 58 % (ref 43–78)
NRBC # BLD: 0 K/UL (ref 0–0.2)
PLATELET # BLD AUTO: 319 K/UL (ref 150–450)
PMV BLD AUTO: 10.4 FL (ref 9.4–12.3)
POTASSIUM SERPL-SCNC: 4.1 MMOL/L (ref 3.5–5.1)
PROT SERPL-MCNC: 8.4 G/DL (ref 6.3–8.2)
RBC # BLD AUTO: 4.43 M/UL (ref 4.05–5.2)
SODIUM SERPL-SCNC: 141 MMOL/L (ref 136–145)
SOURCE, COVRS: NORMAL
TSH SERPL DL<=0.005 MIU/L-ACNC: 1.61 UIU/ML
WBC # BLD AUTO: 8.1 K/UL (ref 4.3–11.1)

## 2022-03-02 PROCEDURE — 87635 SARS-COV-2 COVID-19 AMP PRB: CPT

## 2022-03-02 PROCEDURE — 85025 COMPLETE CBC W/AUTO DIFF WBC: CPT

## 2022-03-02 PROCEDURE — 80053 COMPREHEN METABOLIC PANEL: CPT

## 2022-03-02 PROCEDURE — 99284 EMERGENCY DEPT VISIT MOD MDM: CPT

## 2022-03-02 PROCEDURE — 71046 X-RAY EXAM CHEST 2 VIEWS: CPT

## 2022-03-02 PROCEDURE — 84443 ASSAY THYROID STIM HORMONE: CPT

## 2022-03-02 RX ORDER — SODIUM CHLORIDE 0.9 % (FLUSH) 0.9 %
5-40 SYRINGE (ML) INJECTION AS NEEDED
Status: DISCONTINUED | OUTPATIENT
Start: 2022-03-02 | End: 2022-03-02 | Stop reason: HOSPADM

## 2022-03-02 RX ORDER — METHYLPREDNISOLONE 4 MG/1
TABLET ORAL
Qty: 1 DOSE PACK | Refills: 0 | Status: SHIPPED | OUTPATIENT
Start: 2022-03-02 | End: 2022-03-10 | Stop reason: ALTCHOICE

## 2022-03-02 RX ORDER — SODIUM CHLORIDE 0.9 % (FLUSH) 0.9 %
5-40 SYRINGE (ML) INJECTION EVERY 8 HOURS
Status: DISCONTINUED | OUTPATIENT
Start: 2022-03-02 | End: 2022-03-02 | Stop reason: HOSPADM

## 2022-03-02 NOTE — ED PROVIDER NOTES
Patient is a 40-year-old female who presents with concerns of chemical exposure and headache. She states 2 days ago she was cleaning her oven prior to inspection of her apartment and she believes the fumes from the oven  irritated her sinuses. She states that she has had fullness in her head since. She also has moments of feeling very hot and very cold. She also notes that she feels achy all over but states that she always feels achy all over and so she does not know if this is a new issue. She came to the ER because she was concerned that her sinuses are irritated from the chemical exposure. She would also like to be tested for Covid because she did go to a doctor's appointment into the grocery store recently. She denies any chest pain, shortness of breath or difficulty breathing. The history is provided by the patient. Headache  Associated symptoms include headaches. Pertinent negatives include no chest pain, no abdominal pain and no shortness of breath. Chemical exposure  Associated symptoms include headaches. Pertinent negatives include no chest pain, no abdominal pain and no shortness of breath.         Past Medical History:   Diagnosis Date    Anxiety     Arthritis     Bilateral carpal tunnel syndrome 2020    Chronic obstructive pulmonary disease (HCC)     Chronic pain     Contact dermatitis and other eczema, due to unspecified cause     [address] [address]     Fibroids     Hypercholesterolemia     Hypertension     Trauma        Past Surgical History:   Procedure Laterality Date    COLONOSCOPY N/A 9/3/2019    COLONOSCOPY /BMI 28 performed by [facility] at [facility] ENDOSCOPY    HX CARPAL TUNNEL RELEASE Bilateral 2021    HX  SECTION      x2    HX MYOMECTOMY      HX ORTHOPAEDIC Left     Broke ankle has screws in place    HX OTHER SURGICAL      Cervical spine C-6-C&7         Family History:   Problem Relation Age of Onset    OSTEOARTHRITIS Mother    Bandar Adams Hypertension Mother     Hypertension Father     Diabetes Father     Other Father     Hypertension Sister     Thyroid Disease Sister     Other Sister         fibroids    Hypertension Brother     Heart Attack Paternal Grandmother     Diabetes Paternal Grandmother         colon    Cancer Paternal Grandmother        Social History     Socioeconomic History    Marital status: SINGLE     Spouse name: Not on file    Number of children: Not on file    Years of education: Not on file    Highest education level: Not on file   Occupational History    Not on file   Tobacco Use    Smoking status: Former Smoker    Smokeless tobacco: Never Used   Vaping Use    Vaping Use: Never used   Substance and Sexual Activity    Alcohol use: Yes     Comment: occ    Drug use: No    Sexual activity: Not Currently   Other Topics Concern     Service Not Asked    Blood Transfusions Not Asked    Caffeine Concern Yes    Occupational Exposure Not Asked    Hobby Hazards Not Asked    Sleep Concern Not Asked    Stress Concern Not Asked    Weight Concern Not Asked    Special Diet Not Asked    Back Care Not Asked    Exercise Yes    Bike Helmet Not Asked    Seat Belt Yes    Self-Exams Yes   Social History Narrative    Not on file     Social Determinants of Health     Financial Resource Strain:     Difficulty of Paying Living Expenses: Not on file   Food Insecurity:     Worried About Running Out of Food in the Last Year: Not on file    Codie of Food in the Last Year: Not on file   Transportation Needs:     Lack of Transportation (Medical): Not on file    Lack of Transportation (Non-Medical):  Not on file   Physical Activity:     Days of Exercise per Week: Not on file    Minutes of Exercise per Session: Not on file   Stress:     Feeling of Stress : Not on file   Social Connections:     Frequency of Communication with Friends and Family: Not on file    Frequency of Social Gatherings with Friends and Family: Not on file    Attends Oriental orthodox Services: Not on file    Active Member of Clubs or Organizations: Not on file    Attends Club or Organization Meetings: Not on file    Marital Status: Not on file   Intimate Partner Violence:     Fear of Current or Ex-Partner: Not on file    Emotionally Abused: Not on file    Physically Abused: Not on file    Sexually Abused: Not on file   Housing Stability:     Unable to Pay for Housing in the Last Year: Not on file    Number of [place] in the Last Year: Not on file    Unstable Housing in the Last Year: Not on file         ALLERGIES: Insect venom, Nuts [tree nut], and Penicillin g    Review of Systems   Constitutional: Negative for chills, fatigue and fever. HENT: Positive for sinus pressure. Negative for congestion, postnasal drip, rhinorrhea and sore throat. Respiratory: Negative for cough and shortness of breath. Cardiovascular: Negative for chest pain. Gastrointestinal: Negative for abdominal pain, nausea and vomiting. Genitourinary: Negative for dysuria and flank pain. Musculoskeletal: Negative for back pain. Neurological: Positive for headaches. Negative for light-headedness. Psychiatric/Behavioral: Negative for behavioral problems. Vitals:    03/02/22 1128   BP: (!) 140/88   Pulse: 82   Resp: 16   Temp: 98.4 °F (36.9 °C)   SpO2: 99%   Weight: 87.5 kg (193 lb)   Height: 5' 6\" (1.676 m)            Physical Exam  Vitals and nursing note reviewed. Constitutional:       General: She is not in acute distress. Appearance: She is not ill-appearing or toxic-appearing. HENT:      Head: Normocephalic and atraumatic. Nose: No rhinorrhea. Mouth/Throat:      Mouth: Mucous membranes are moist.   Eyes:      Extraocular Movements: Extraocular movements intact. Conjunctiva/sclera: Conjunctivae normal.      Pupils: Pupils are equal, round, and reactive to light. Cardiovascular:      Rate and Rhythm: Normal rate. Pulses: Normal pulses. Pulmonary:      Effort: Pulmonary effort is normal.      Breath sounds: Normal breath sounds. Skin:     General: Skin is warm and dry. Neurological:      Mental Status: She is alert and oriented to person, place, and time. Psychiatric:         Mood and Affect: Mood normal.         Behavior: Behavior normal.         Thought Content: Thought content normal.         Judgment: Judgment normal.          MDM  Number of Diagnoses or Management Options  Acute sinusitis, recurrence not specified, unspecified location  Chemical exposure  Diagnosis management comments: Patient is a 57-year-old female who presents with complaint of concern for chemical exposure and sinus pressure. She is afebrile, nontoxic appearance, vital signs within appropriate limits. Lungs clear to auscultation on exam.     We will get chest x-ray and labs including CBC, CMP, TSH and rapid Covid test.    CXR: No acute cardiopulmonary abnormality. Labs Reviewed  METABOLIC PANEL, COMPREHENSIVE - Abnormal; Notable for the following components:     Anion gap                     6 (*)                  Glucose                       117 (*)                Creatinine                    1.07 (*)               GFR est non-AA                57 (*)                 Protein, total                8.4 (*)                Globulin                      4.1 (*)                A-G Ratio                     1.0 (*)             All other components within normal limits  COVID-19 RAPID TEST  CBC WITH AUTOMATED DIFF  TSH 3RD GENERATION    All results discussed with patient at bedside. Will DC with Medrol dose pack for sinus pressure but do not see any need for antibiotics at this time. Discussed follow-up as well as reasons to return to ER. Patient agreeable to plan.          Procedures

## 2022-03-02 NOTE — ED TRIAGE NOTES
Patient advises that she was exposed to over  on 2/28 and became light headache and headache at that time. Patient advises that it has not get any better and only got worse. Patient with mask on during triage. Patient advises headache and generalized body aches which is normal for her. Patient advises difficulty explaining complaint.

## 2022-03-02 NOTE — ED NOTES
Pt feeling lighted and headache started 2 days after exposed to oven ,  Patient evaluated initially in triage. Rapid Medical Evaluation was conducted and necessary orders have been placed. I have performed a medical screening exam.  Care will now be transferred to the provider in the back of the emergency department.   JESSE Reynolds 11:30 AM

## 2022-03-02 NOTE — TELEPHONE ENCOUNTER
Received call from   at [facility] with Red Flag Complaint. Subjective: Caller states \"Lightheaded \"     Current Symptoms:   Lightheaded and dizzy  With  Movement   pressure headache after cleaning a few days ago pt reports onset of symptoms     Onset: 2 days        Associated Symptoms:   Headache - pressure , temples  And head  Eyes feel irritated   Pressure on face  Loose stool   2 days ago  Pt states it has resolved   Chronic   Weakness   Chronic chest pain  History of Angina  Denies having it now   Pain under  Breast  2 days ago     Pain Severity:   Pt is under care for pain management     Temperature:   Pt unsure she felt feverish  No thermometer     What has been tried:   Nyquil,  Tylenol, ibuprofen, baby aspirin yesterday     Recommended disposition: See in Office Today    Care advice provided, patient verbalizes understanding; denies any other questions or concerns; instructed to call back for any new or worsening symptoms. Patient/Caller agrees with recommended disposition; writer provided warm transfer to [address] [place] [address] at [facility] for appointment scheduling    Attention Provider: Thank you for allowing me to participate in the care of your patient. The patient was connected to triage in response to information provided to the ECC. Please do not respond through this encounter as the response is not directed to a shared pool.       Reason for Disposition   MODERATE dizziness (e.g., interferes with normal activities) (Exception: dizziness caused by heat exposure, sudden standing, or poor fluid intake)    Protocols used: DIZZINESS-ADULT-[place]

## 2022-03-09 PROBLEM — Z09 HOSPITAL DISCHARGE FOLLOW-UP: Status: ACTIVE | Noted: 2022-03-09

## 2022-03-19 PROBLEM — M54.12 CERVICAL RADICULOPATHY: Status: ACTIVE | Noted: 2017-06-29

## 2022-03-19 PROBLEM — E07.9 THYROID DISEASE: Status: ACTIVE | Noted: 2019-10-30

## 2022-03-19 PROBLEM — E11.65 TYPE 2 DIABETES MELLITUS WITH HYPERGLYCEMIA, WITHOUT LONG-TERM CURRENT USE OF INSULIN (HCC): Status: ACTIVE | Noted: 2019-10-30

## 2022-03-19 PROBLEM — M50.30 DDD (DEGENERATIVE DISC DISEASE), CERVICAL: Status: ACTIVE | Noted: 2017-06-29

## 2022-03-19 PROBLEM — Z09 HOSPITAL DISCHARGE FOLLOW-UP: Status: ACTIVE | Noted: 2022-03-09

## 2022-03-19 PROBLEM — G56.03 BILATERAL CARPAL TUNNEL SYNDROME: Status: ACTIVE | Noted: 2020-11-24

## 2022-03-19 PROBLEM — R73.02 IMPAIRED GLUCOSE TOLERANCE: Status: ACTIVE | Noted: 2017-09-13

## 2022-04-08 PROBLEM — Z09 HOSPITAL DISCHARGE FOLLOW-UP: Status: RESOLVED | Noted: 2022-03-09 | Resolved: 2022-04-08

## 2022-06-16 ENCOUNTER — OFFICE VISIT (OUTPATIENT)
Dept: GYNECOLOGY | Age: 52
End: 2022-06-16
Payer: MEDICAID

## 2022-06-16 VITALS
DIASTOLIC BLOOD PRESSURE: 72 MMHG | BODY MASS INDEX: 29.73 KG/M2 | HEIGHT: 66 IN | WEIGHT: 185 LBS | SYSTOLIC BLOOD PRESSURE: 106 MMHG

## 2022-06-16 DIAGNOSIS — N89.8 VAGINAL IRRITATION: Primary | ICD-10-CM

## 2022-06-16 DIAGNOSIS — B96.89 BV (BACTERIAL VAGINOSIS): ICD-10-CM

## 2022-06-16 DIAGNOSIS — R10.2 PELVIC PAIN: ICD-10-CM

## 2022-06-16 DIAGNOSIS — N89.8 VAGINAL DISCHARGE: ICD-10-CM

## 2022-06-16 DIAGNOSIS — N76.0 BV (BACTERIAL VAGINOSIS): ICD-10-CM

## 2022-06-16 DIAGNOSIS — Z12.4 ROUTINE CERVICAL SMEAR: ICD-10-CM

## 2022-06-16 LAB
BILIRUBIN, URINE, POC: NEGATIVE
BLOOD URINE, POC: ABNORMAL
GLUCOSE URINE, POC: NEGATIVE
KETONES, URINE, POC: NEGATIVE
LEUKOCYTE ESTERASE, URINE, POC: NEGATIVE
NITRITE, URINE, POC: NEGATIVE
PH, URINE, POC: 6 (ref 4.6–8)
PROTEIN,URINE, POC: NEGATIVE
SPECIFIC GRAVITY, URINE, POC: 1.02 (ref 1–1.03)
URINALYSIS CLARITY, POC: CLEAR
URINALYSIS COLOR, POC: YELLOW
UROBILINOGEN, POC: 0.2
WET PREP (POC): NORMAL

## 2022-06-16 PROCEDURE — 81003 URINALYSIS AUTO W/O SCOPE: CPT | Performed by: OBSTETRICS & GYNECOLOGY

## 2022-06-16 PROCEDURE — 87210 SMEAR WET MOUNT SALINE/INK: CPT | Performed by: OBSTETRICS & GYNECOLOGY

## 2022-06-16 PROCEDURE — 99214 OFFICE O/P EST MOD 30 MIN: CPT | Performed by: OBSTETRICS & GYNECOLOGY

## 2022-06-16 RX ORDER — HYDROCODONE BITARTRATE AND ACETAMINOPHEN 5; 325 MG/1; MG/1
TABLET ORAL
COMMUNITY
Start: 2021-11-09

## 2022-06-16 RX ORDER — METRONIDAZOLE 7.5 MG/G
GEL VAGINAL
Qty: 70 G | Refills: 5 | Status: SHIPPED | OUTPATIENT
Start: 2022-06-16

## 2022-06-16 ASSESSMENT — PATIENT HEALTH QUESTIONNAIRE - PHQ9
2. FEELING DOWN, DEPRESSED OR HOPELESS: 0
SUM OF ALL RESPONSES TO PHQ QUESTIONS 1-9: 0
SUM OF ALL RESPONSES TO PHQ9 QUESTIONS 1 & 2: 0
SUM OF ALL RESPONSES TO PHQ QUESTIONS 1-9: 0
1. LITTLE INTEREST OR PLEASURE IN DOING THINGS: 0

## 2022-06-16 NOTE — PROGRESS NOTES
WALTER Sierra is a 46 y.o. female seen for vaginal dc with odor. Past Medical History, Past Surgical History, Family history, Social History, and Medications were all reviewed with the patient today and updated as necessary. Current Outpatient Medications   Medication Sig    HYDROcodone-acetaminophen (NORCO) 5-325 MG per tablet hydrocodone 5 mg-acetaminophen 325 mg tablet    metroNIDAZOLE (METROGEL) 0.75 % vaginal gel Use nightly in the vagina for 5 nights    Lancets MISC Check fasting BS daily. Dx: T2DM    ascorbic acid (VITAMIN C) 500 MG tablet Take by mouth    aspirin 81 MG EC tablet Take by mouth daily    atenolol (TENORMIN) 25 MG tablet Take 25 mg by mouth daily    cetirizine (ZYRTEC) 10 MG tablet TAKE 1 TABLET BY MOUTH EVERY DAY AT NIGHT    Ergocalciferol 50 MCG (2000 UT) TABS Take 1 capsule by mouth daily    Estradiol (VAGIFEM) 10 MCG TABS vaginal tablet Place 10 mcg vaginally Twice a Week    fluconazole (DIFLUCAN) 150 MG tablet 1 tablet now and repeat dose in 4 days    levothyroxine (SYNTHROID) 25 MCG tablet Take 25 mcg by mouth every morning (before breakfast)    metFORMIN (GLUCOPHAGE) 500 MG tablet TAKE 1 TABLET BY MOUTH TWICE A DAY    methocarbamol (ROBAXIN) 500 MG tablet Take 500 mg by mouth 3 times daily    oxybutynin (DITROPAN) 5 MG tablet Take 5 mg by mouth 3 times daily    pantoprazole (PROTONIX) 40 MG tablet Take 40 mg by mouth daily    simvastatin (ZOCOR) 20 MG tablet Take 20 mg by mouth    amitriptyline (ELAVIL) 25 MG tablet Take 25 mg by mouth (Patient not taking: Reported on 6/16/2022)    erythromycin (ROMYCIN) 5 MG/GM ophthalmic ointment Apply thin layer to lower right eyelid BID for 7 days. (Patient not taking: Reported on 6/16/2022)    meloxicam (MOBIC) 7.5 MG tablet Take 7.5 mg by mouth daily (Patient not taking: Reported on 6/16/2022)     No current facility-administered medications for this visit.      Allergies   Allergen Reactions    Macadamia Nut Oil Hives     [place] Nuts    Penicillin G Nausea Only     Past Medical History:   Diagnosis Date    Anxiety     Arthritis     Bilateral carpal tunnel syndrome 2020    Chronic obstructive pulmonary disease (HCC)     Chronic pain     Contact dermatitis and other eczema, due to unspecified cause     Diabetes ([address]     Fibroids     Hypercholesterolemia     Hypertension     Trauma      Past Surgical History:   Procedure Laterality Date    CARPAL TUNNEL RELEASE Bilateral 2021     SECTION      x2    COLONOSCOPY N/A 9/3/2019    COLONOSCOPY /BMI 28 performed by Sergey Davis MD at [facility] [address] Left     Broke ankle has screws in place    OTHER SURGICAL HISTORY      Cervical spine C-6-C&7     Family History   Problem Relation Age of Onset    Other Father     Diabetes Father     Hypertension Father     Hypertension Mother     Cancer Paternal Grandmother     Osteoarthritis Mother     Heart Attack Paternal Grandmother     Hypertension Brother     Hypertension Sister     Other Sister         fibroids    Thyroid Disease Sister     Diabetes Paternal Grandmother         colon      Social History     Tobacco Use    Smoking status: Former Smoker    Smokeless tobacco: Never Used   Substance Use Topics    Alcohol use: Yes       Social History     Substance and Sexual Activity   Sexual Activity Not Currently     OB History    Para Term  AB Living   2 0 0 0 0 0   SAB IAB Ectopic Molar Multiple Live Births   0 0 0 0 0 0       Health Maintenance  Mammogram:   Colonoscopy:   Bone Density:  Pap smear     ROS:    Review of Systems  General: Not Present- Chills, Fever, Fatigue, Insomnia, Hot flashes/Night sweats, Weight gain  Skin: Not Present- Bruising, Change in Wart/Mole, Excessive Sweating, Itching, Nail Changes, New Lesions, Rash, Skin Color Changes and Ulcer.   HEENT: Not Present- Headache, Blurred Vision, Double Vision, Glaucoma, Visual Disturbances, Hearing Loss, Ringing in the Ears, Vertigo, Nose Bleed, Bleeding Gums, Hoarseness and Sore Throat. Neck: Not Present- Neck Pain and Neck Swelling. Respiratory: Not Present- Cough, Difficulty Breathing and Difficulty Breathing on Exertion. Breast: Not Present- Breast Mass, Breast Pain, Breast Swelling, Nipple Discharge, Nipple Pain, Recent Breast Size Changes and Skin Changes. Cardiovascular: Not Present- Abnormal Blood Pressure, Chest Pain, Edema, Fainting / Blacking Out, Palpitations, Shortness of Breath and Swelling of Extremities. Gastrointestinal: Not Present- Abdominal Pain, Abdominal Swelling, Bloating, Change in Bowel Habits, Constipation, Diarrhea, Difficulty Swallowing, Gets full quickly at meals, Nausea, Rectal Bleeding and Vomiting. Female Genitourinary: Not Present- Dysmenorrhea, Dyspareunia, Decreased libido, Excessive Menstrual Bleeding, Menstrual Irregularities, Pelvic Pain, Urinary Complaints, Vaginal Discharge, Vaginal itching/burning, Vaginal odor  Musculoskeletal: Not Present- Joint Pain and Muscle Pain. Neurological: Not Present- Dizziness, Fainting, Headaches and Seizures. Psychiatric: Not Present- Anxiety, Depression, Mood changes and Panic Attacks. Endocrine: Not Present- Appetite Changes, Cold Intolerance, Excessive Thirst, Excessive Urination and Heat Intolerance. Hematology: Not Present- Abnormal Bleeding, Easy Bruising and Enlarged Lymph Nodes. PHYSICAL EXAM:    /72   Ht 5' 6\" (1.676 m)   Wt 185 lb (83.9 kg)   BMI 29.86 kg/m²     Physical Exam   General   Mental Status - Alert. General Appearance - Cooperative. Abdomen   Inspection: - Inspection Normal.   Palpation/Percussion: Palpation and Percussion of the abdomen reveal - Non Tender, No Rebound tenderness, No Rigidity (guarding), No hepatosplenomegaly, No Palpable abdominal masses and Soft.    Auscultation: Auscultation of the abdomen reveals - Bowel sounds normal.     Female Genitourinary     External Genitalia   Vulva: - Normal. Perineum - Normal. Bartholin's Gland - Bilateral - Normal. Clitoris - Normal.   Introitus: Characteristics - Normal.   Urethra: Characteristics - Normal.     Speculum & Bimanual   Vagina: Vaginal Mucosa - Normal.   Vaginal Wall: - Normal.   Vaginal Lesions - None. Cervix: Characteristics - Normal.   Uterus: Characteristics - Normal.   Adnexa: - Normal.   Bladder - Normal.     Lymphatics  No cervical, axillary or groin adenopathy            Medical problems and test results were reviewed with the patient today. OMAR Td Cheek was seen today for vaginal discharge. Diagnoses and all orders for this visit:    Vaginal irritation    Pelvic pain  -     AMB POC URINALYSIS DIP STICK AUTO W/O MICRO    Vaginal discharge  -     AMB POC SMEAR, STAIN & INTERPRET, WET MOUNT    Routine cervical smear  -     PAP IG, CT-NG, rfx Aptima HPV ASCUS ([id number], [id number])    BV (bacterial vaginosis)  -     metroNIDAZOLE (METROGEL) 0.75 % vaginal gel; Use nightly in the vagina for 5 nights        Wet prep done and reviewed by me - BV. Treat with Metrogel      Time:  I spent  30 minutes in preparing to see patient (including chart review and preparation), obtaining and/or reviewing additional medical history, performing a physical exam and evaluation, documenting clinical information in the electronic health record, independently interpreting results, communicating results to patient, family or caregiver, and/or coordinating care. No follow-up provider specified.         Jabari Egan MD

## 2022-06-17 ENCOUNTER — TELEPHONE (OUTPATIENT)
Dept: INTERNAL MEDICINE CLINIC | Facility: CLINIC | Age: 52
End: 2022-06-17

## 2022-06-17 DIAGNOSIS — E03.9 ACQUIRED HYPOTHYROIDISM: Primary | ICD-10-CM

## 2022-06-17 DIAGNOSIS — I10 ESSENTIAL HYPERTENSION: ICD-10-CM

## 2022-06-17 DIAGNOSIS — E11.65 TYPE 2 DIABETES MELLITUS WITH HYPERGLYCEMIA, WITHOUT LONG-TERM CURRENT USE OF INSULIN (HCC): ICD-10-CM

## 2022-06-17 RX ORDER — ATENOLOL 25 MG/1
25 TABLET ORAL DAILY
Qty: 90 TABLET | Refills: 0 | Status: SHIPPED | OUTPATIENT
Start: 2022-06-17

## 2022-06-17 RX ORDER — LEVOTHYROXINE SODIUM 0.03 MG/1
25 TABLET ORAL
Qty: 90 TABLET | Refills: 0 | Status: SHIPPED | OUTPATIENT
Start: 2022-06-17

## 2022-06-17 NOTE — TELEPHONE ENCOUNTER
Reviewed pt's chart. She needs refills on Metformin, atenolol and levothyroxine. Meds sent.      Pantoprazole and simvastatin were sent on 3/9/22 for a 1 year supply to [facility].

## 2022-06-17 NOTE — TELEPHONE ENCOUNTER
Patient is requesting refills on her BP meds, thyroid meds, cholesterol meds, and diabetes meds. She could not provide any of the names.

## 2022-06-21 LAB
C TRACH RRNA CVX QL NAA+PROBE: NEGATIVE
CYTOLOGIST CVX/VAG CYTO: NORMAL
CYTOLOGY CVX/VAG DOC THIN PREP: NORMAL
HPV REFLEX: NORMAL
Lab: NORMAL
N GONORRHOEA RRNA CVX QL NAA+PROBE: NEGATIVE
PATH REPORT.FINAL DX SPEC: NORMAL
STAT OF ADQ CVX/VAG CYTO-IMP: NORMAL

## 2022-06-23 ENCOUNTER — OFFICE VISIT (OUTPATIENT)
Dept: NEUROLOGY | Age: 52
End: 2022-06-23
Payer: MEDICAID

## 2022-06-23 DIAGNOSIS — R20.0 NUMBNESS AND TINGLING OF BOTH FEET: Primary | ICD-10-CM

## 2022-06-23 DIAGNOSIS — G62.9 NEUROPATHY: ICD-10-CM

## 2022-06-23 DIAGNOSIS — R20.2 NUMBNESS AND TINGLING OF BOTH FEET: Primary | ICD-10-CM

## 2022-06-23 PROCEDURE — 95885 MUSC TST DONE W/NERV TST LIM: CPT | Performed by: PSYCHIATRY & NEUROLOGY

## 2022-06-23 PROCEDURE — 95912 NRV CNDJ TEST 11-12 STUDIES: CPT | Performed by: PSYCHIATRY & NEUROLOGY

## 2022-06-23 NOTE — PROGRESS NOTES
EMG/Nerve Conduction Study Procedure Note  [facility], [address] [place] [address]   [phone]      Hx:    Exam:     46 y.o.r.h.. female +DM2 referred for EMG/NCV BLE by Amauri Hall MD. Pt has numbness and tingling of feet. Balance issues. A1C 6.2. high 6.4. Summary            needle EMG of selected muscles of the lower extremities as below. Conduction velocity testing lower extremities. 1.      Controlled environmental factors / EMG lab. Temperature. 2. NCV : sensory segments:   Normal bilateral sural SCB SNAP. 3. NCV plantar sensory segments:    Abnormal = = bilateral plantar SCV SNAP both medial and lateral segments are all no response/absent. 4. NCV Motor MCV segments: Normal bilateral peroneal bilateral tibial MCV CMAP. 5. F-wave studies:    Normal bilateral peroneal and tibial F waves. 6. H-REFLEX Studies:    Normal bilateral H-reflexes. 7.  NEEDLE EMG:   Tested muscles[de-identified]    Bilateral TA MG VL muscles = normal =Normal insertional activity and interference pattern/recruitment. No fasciculations fibrillations positive sharp waves. Normal MUP. No BSS AP. No giant MUP. No myotonia. No upper motor neuron sign. INTERPRETATION:   THESE FINDINGS ARE ELECTROPHYSIOLOGIC EVIDENCE OF PLANTAR NEUROPATHY THAT IS SYMMETRIC DISTAL IN THE LOWER EXTREMITIES ONLY WITHOUT OTHER EVIDENCE FOR NEUROPATHY. THIS WOULD BE CONSISTENT WITH THE LENGTH DEPENDENT DISTAL AND GRALISE SENSORY NEUROPATHY AT THE PRESENT TIME MIXED PROBABLY MOSTLY AXONALDEMYELINATING. NO MYOPATHY MYOTONIA OR FASCICULATIONS. CONCLUSION:      Compatible with distal length dependent polyneuropathy but mainly a sensory neuropathy of the feet. Plantar nerves. Procedure Details:       Correlates with complaints. Please Note[de-identified]     Data and waveforms * filed under Procedure category [facility]. See Procedure Files for complete data pages.                 Carrillo Mckenna Syd Kennedy MD  Consultative Neurology, Neurodiagnostics   [facility]    [address] [place] [address], [address] [place] [address]  Phone:  [phone]  Fax:   [phone]          + + +   Glossary:   MUP: motor unit potential;  SNAP: sensory nerve action potential; Fibs:  Fibrillations; Fascic: fasciculations; IA: insertional activity;  IP: interference pattern;  SCV :  Sensory conduction velocity;  MCV: motor conduction velocity; NOTE[de-identified] muscles are abbreviated latin initials. Nicolet raw datafile[de-identified]   * filed at Procedure or [employer].  *

## 2022-06-29 ENCOUNTER — OFFICE VISIT (OUTPATIENT)
Dept: NEUROLOGY | Age: 52
End: 2022-06-29
Payer: MEDICAID

## 2022-06-29 ENCOUNTER — TELEPHONE (OUTPATIENT)
Dept: INTERNAL MEDICINE CLINIC | Facility: CLINIC | Age: 52
End: 2022-06-29

## 2022-06-29 VITALS
HEART RATE: 68 BPM | SYSTOLIC BLOOD PRESSURE: 142 MMHG | WEIGHT: 189.6 LBS | DIASTOLIC BLOOD PRESSURE: 84 MMHG | BODY MASS INDEX: 30.6 KG/M2

## 2022-06-29 DIAGNOSIS — G62.9 PERIPHERAL POLYNEUROPATHY: ICD-10-CM

## 2022-06-29 DIAGNOSIS — G62.9 PERIPHERAL POLYNEUROPATHY: Primary | ICD-10-CM

## 2022-06-29 DIAGNOSIS — R25.1 TREMORS OF NERVOUS SYSTEM: ICD-10-CM

## 2022-06-29 DIAGNOSIS — M79.2 NEUROPATHIC PAIN: ICD-10-CM

## 2022-06-29 LAB
ERYTHROCYTE [SEDIMENTATION RATE] IN BLOOD: 9 MM/HR (ref 0–30)
VIT B12 SERPL-MCNC: 593 PG/ML (ref 193–986)

## 2022-06-29 PROCEDURE — 99214 OFFICE O/P EST MOD 30 MIN: CPT | Performed by: PSYCHIATRY & NEUROLOGY

## 2022-06-29 RX ORDER — PREGABALIN 75 MG/1
75 CAPSULE ORAL 3 TIMES DAILY
Qty: 90 CAPSULE | Refills: 2 | Status: SHIPPED | OUTPATIENT
Start: 2022-06-29 | End: 2022-10-31 | Stop reason: SDUPTHER

## 2022-06-29 ASSESSMENT — ENCOUNTER SYMPTOMS
BACK PAIN: 1
CONSTIPATION: 0
COUGH: 0

## 2022-06-29 NOTE — TELEPHONE ENCOUNTER
BP and thyroid sent to wrong pharm but pt spoke w pharm and got issue worked out and meds picked up. No other issues.

## 2022-06-29 NOTE — PROGRESS NOTES
[address], [facility], [address] [place] [address]  Phone: [phone] Fax [phone]  Charito Lilly MD      Patient: Karlei Tobin  Provider: Charito Lilly MD    CC:   Chief Complaint   Patient presents with    Follow-up     EMG    Results     Referring Provider:    History of Present Illness: Duane Baldy is a 46 y.o. RH female who presents for follow-up of tremor and neuropathic pain. She is unaccompanied for today's visit. She was last seen March 2022. Patient presents for follow-up and continued management of chronic neuropathic pain. She is followed by pain management for both cervical and lumbar pain and has had previous surgeries to the cervical spine. There is a history of facet injections in the past of the lower spine though she has not had any recently. She is currently treated with hydrocodone. Recent nerve conduction studies have been notable for a length dependent peripheral neuropathy, mostly sensory, and affecting the plantar nerves of the feet. She does have neuropathic pain in the feet in addition to the pain in her neck and back as noted above. There has been reported to be a tremor of the hands which is intermittent and fluctuates during the day. There have been no obvious findings with either essential tremor or parkinsonism. Current medications include: Atenolol 25 mg daily  Robaxin 500 mg as needed  Meloxicam 7.5 mg daily  Hydrocodone-acetaminophen 5-325 mg as needed     Previous medication trials include: Gabapentin, amitriptyline Cymbalta       Review of Systems:   Review of Systems   Constitutional: Negative for fever. HENT: Negative for congestion. Eyes: Negative for visual disturbance. Respiratory: Negative for cough. Cardiovascular: Negative for chest pain. Gastrointestinal: Negative for constipation. Genitourinary: Negative for dysuria.    Musculoskeletal: Positive for arthralgias, back pain, myalgias and neck pain. Skin: Negative for rash. Neurological: Positive for tremors and numbness. Psychiatric/Behavioral: Negative for hallucinations. Lab/Imaging Review:   I REVIEWED PERTINENT LABS, IMAGES, AND REPORTS WITH THE PATIENT PERSONALLY, DIRECTLY AND FULLY. THE MOST PERTINENT FINDINGS ARE NOTED BELOW:    EMG/NCV March 2022:  INTERPRETATION: THESE FINDINGS ARE ELECTROPHYSIOLOGIC EVIDENCE OF PLANTAR NEUROPATHY THAT IS SYMMETRIC DISTAL IN THE LOWER EXTREMITIES ONLY WITHOUT OTHER EVIDENCE FOR NEUROPATHY. THIS WOULD BE CONSISTENT WITH THE LENGTH DEPENDENT DISTAL AND GRALISE SENSORY NEUROPATHY AT THE PRESENT TIME MIXED PROBABLY MOSTLY AXONAL-DEMYELINATING. NO MYOPATHY MYOTONIA OR FASCICULATIONS. CONCLUSION: Compatible with distal length dependent polyneuropathy but mainly a sensory neuropathy of the feet. Plantar nerves. EMG/NCV November 2020: This nerve conduction study showed neurophysiologic evidence of bilateral carpal tunnel syndrome, moderate near to severe in degree on the right and moderate on the left.  Other nerves tested were normal.  Needle EMG to the left upper limb and bilateral APB was normal showing no evidence of left-sided C5/6/7/8/T1 radiculopathy or brachial plexopathy.  No denervation changes in bilateral APB.     MRI Cervical Spine Sept 2018: IMPRESSION:  1. Postsurgical changes at C6-7.  2.  Degenerative changes elsewhere maximal at C4-5 as detailed above     MRI Thoracic Spine September 2018: IMPRESSION:  Mild scoliosis. Otherwise unremarkable exam      MRI Brain May 2018: IMPRESSION: Normal MRI of the brain.     MRI Lumbar Spine December 2017:   IMPRESSION: L3-4: Moderate central stenosis due to hypertrophic facet arthropathy and mild annular bulge. Thecal sac measures 10 mm in AP diameter.  No focal disc herniation.       Past Medical History:     Past medical history, surgical history, social history, family history, medications, and allergies were reviewed and updated as appropriate. PAST MEDICAL HISTORY:  Past Medical History:   Diagnosis Date    Anxiety     Arthritis     Bilateral carpal tunnel syndrome 2020    Chronic obstructive pulmonary disease (HCC)     Chronic pain     Contact dermatitis and other eczema, due to unspecified cause     Diabetes ([address]     Fibroids     Hypercholesterolemia     Hypertension     Trauma      PAST SURGICAL HISTORY:   Past Surgical History:   Procedure Laterality Date    CARPAL TUNNEL RELEASE Bilateral 2021     SECTION      x2    COLONOSCOPY N/A 9/3/2019    COLONOSCOPY /BMI 28 performed by Radha Hagen MD at [address] [place] [address] Left     Broke ankle has screws in place    OTHER SURGICAL HISTORY      Cervical spine C-6-C&7     FAMILY HISTORY:  Family History   Problem Relation Age of Onset    Other Father     Diabetes Father     Hypertension Father     Hypertension Mother     Cancer Paternal Grandmother     Osteoarthritis Mother     Heart Attack Paternal Grandmother     Hypertension Brother     Hypertension Sister     Other Sister         fibroids    Thyroid Disease Sister     Diabetes Paternal Grandmother         colon      SOCIAL HISTORY:  Social History     Socioeconomic History    Marital status: Single     Spouse name: Not on file    Number of children: Not on file    Years of education: Not on file    Highest education level: Not on file   Occupational History    Not on file   Tobacco Use    Smoking status: Former Smoker    Smokeless tobacco: Never Used   Substance and Sexual Activity    Alcohol use:  Yes    Drug use: No    Sexual activity: Not Currently   Other Topics Concern    Not on file   Social History Narrative    Not on file     Social Determinants of Health     Financial Resource Strain:     Difficulty of Paying Living Expenses: Not on file   Food Insecurity:     Worried About Running Out of Food in the Last Year: Not on file    [address] in the Last Year: Not on file   Transportation Needs:     Lack of Transportation (Medical): Not on file    Lack of Transportation (Non-Medical): Not on file   Physical Activity:     Days of Exercise per Week: Not on file    Minutes of Exercise per Session: Not on file   Stress:     Feeling of Stress : Not on file   Social Connections:     Frequency of Communication with Friends and Family: Not on file    Frequency of Social Gatherings with Friends and Family: Not on file    Attends Mandaeism Services: Not on file    Active Member of [address] [place] [address] or Organizations: Not on file    Attends Club or Organization Meetings: Not on file    Marital Status: Not on file   Intimate Partner Violence:     Fear of Current or Ex-Partner: Not on file    Emotionally Abused: Not on file    Physically Abused: Not on file    Sexually Abused: Not on file   Housing Stability:     Unable to Pay for Housing in the Last Year: Not on file    Number of [place] in the Last Year: Not on file    Unstable Housing in the Last Year: Not on file       Medications/Allergies:     MEDICATIONS:   Outpatient Encounter Medications as of 6/29/2022   Medication Sig Dispense Refill    pregabalin (LYRICA) 75 MG capsule Take 1 capsule by mouth 3 times daily for 90 days.  90 capsule 2    levothyroxine (SYNTHROID) 25 MCG tablet Take 1 tablet by mouth every morning (before breakfast) 90 tablet 0    metFORMIN (GLUCOPHAGE) 500 MG tablet Take 1 tablet by mouth 2 times daily (with meals) TAKE 1 TABLET BY MOUTH TWICE A  tablet 0    atenolol (TENORMIN) 25 MG tablet Take 1 tablet by mouth daily 90 tablet 0    HYDROcodone-acetaminophen (NORCO) 5-325 MG per tablet hydrocodone 5 mg-acetaminophen 325 mg tablet      ascorbic acid (VITAMIN C) 500 MG tablet Take by mouth as needed       aspirin 81 MG EC tablet Take by mouth daily prn      cetirizine (ZYRTEC) 10 MG tablet TAKE 1 TABLET BY MOUTH EVERY DAY AT NIGHT      Ergocalciferol 50 MCG (2000 UT) TABS Take 1 capsule by mouth daily      Estradiol (VAGIFEM) 10 MCG TABS vaginal tablet Place 10 mcg vaginally Twice a Week      metroNIDAZOLE (METROGEL) 0.75 % vaginal gel Use nightly in the vagina for 5 nights 70 g 5    Lancets MISC Check fasting BS daily. Dx: T2DM      amitriptyline (ELAVIL) 25 MG tablet Take 25 mg by mouth (Patient not taking: Reported on 6/16/2022)      erythromycin [facility]) 5 MG/GM ophthalmic ointment Apply thin layer to lower right eyelid BID for 7 days. (Patient not taking: Reported on 6/16/2022)      fluconazole (DIFLUCAN) 150 MG tablet 1 tablet now and repeat dose in 4 days (Patient not taking: Reported on 6/29/2022)      meloxicam (MOBIC) 7.5 MG tablet Take 7.5 mg by mouth daily (Patient not taking: Reported on 6/16/2022)      methocarbamol (ROBAXIN) 500 MG tablet Take 500 mg by mouth 3 times daily (Patient not taking: Reported on 6/29/2022)      oxybutynin (DITROPAN) 5 MG tablet Take 5 mg by mouth 3 times daily      pantoprazole (PROTONIX) 40 MG tablet Take 40 mg by mouth daily      simvastatin (ZOCOR) 20 MG tablet Take 20 mg by mouth       No facility-administered encounter medications on file as of 6/29/2022. ALLERGIES:  Allergies   Allergen Reactions    Latex Other (See Comments)     Other reaction(s): Other (see comments)      Other Swelling     Insect    Macadamia Nut Oil Hives     [place] Nuts    Penicillin G Nausea Only       Physical Exam:     BP (!) 142/84 (Site: Right Upper Arm, Position: Sitting)   Pulse 68   Wt 189 lb 9.6 oz (86 kg)   BMI 30.60 kg/m²     General Exam:  General: Well developed, well nourished, in no apparent distress. HEENT: Normocephalic, atraumatic. Sclera anicteric. Oropharynx clear. Neck: Supple without masses  Cardiovascular: Regular rate and rhythm. No carotid bruits. Lungs: Non-labored breathing.   Abdomen: Soft, nontender, nondistended. Extremities: Peripheral pulses intact.  No edema and no lele.      Neurological Exam:      MS/Language/Speech:  Alert. Oriented x 3. Language fluent. Speech normal.      Cranial Nerves: PERRL. Eye movements full with normal pursuits. No nystagmus. Face was symmetric with good activation and normal sensation. Tongue and palate were midline. Shoulder shrug symmetric.     Motor: Strength was full in all proximal and distal muscle groups. Tone was normal.  Rapid alternating movements showed no evidence of slowing or amplitude decrement.     Abnormal Movements: I cannot appreciate any tremor at rest.  There is no tremor with posture or with finger-to-nose bilaterally.      Sensory: Intact to light touch and vibration in the distal lower extremities.     Cerebellar: No ataxia or dysmetria.      Reflexes (R/L): Biceps (2+/2+), Brachioradialis (2+/2+), Patellar (2+/2+), Ankle (2+/2+).       Gait: Can rise from a seated position without difficulty. Posture normal. Romberg was normal. Gait showed normal base with normal stride length. No difficulty turning. Arm swing was normal.       Assessment and Plan:     Sherine Gotti is a 46 y.o. female who presents with the following issues: Nusrat Calles was seen today for follow-up and results. Diagnoses and all orders for this visit:    Peripheral polyneuropathy  -     pregabalin (LYRICA) 75 MG capsule; Take 1 capsule by mouth 3 times daily for 90 days. -     Vitamin B12; Future  -     AMENA, Direct, w/Reflex; Future  -     Sedimentation Rate; Future  -     Electrophoresis Protein, Serum; Future  -     Rheumatoid Factor; Future  -     Sjogren's Ab (SS-A, SS-B); Future    Neuropathic pain    Tremors of nervous system      Patient presents for follow-up for review of recent nerve conduction studies of the lower extremities. There is evidence of a mostly sensory length dependent peripheral neuropathy affecting the plantar nerves of both feet. I have recommended serum lab work-up for other acquired causes of neuropathy.     Start pregabalin 75 mg up to 3 times a day as needed for neuropathic pain. She has reportedly failed trials of gabapentin and amitriptyline in the past.    She will continue follow-up with her pain management providers for treatment of her chronic neck and lumbar spine pain. I have reviewed these images and agree that no surgical intervention is necessary at this time. With respect to tremors these do not appear to be a significant concern in previous exams have noted no findings of essential tremor or parkinsonism. We will continue to monitor.        Follow up in 4 months.         Signature: Dyan Travis MD      Date:  6/29/2022    [facility]   [facility] , [facility], [address] [place] [address]  Ph: [phone]  Fax: [phone]         I have personally interviewed and examined Mrs. Ajay Odom and I have personally reviewed all relevant records including labs and imaging as noted above. I have written all aspects of this note. More than 50% of this time was used for counseling regarding my diagnosis, prognosis, and plans for management. Total visit time: 35 minutes.

## 2022-06-29 NOTE — PATIENT INSTRUCTIONS
Blood work today. You can get this done on the first floor (right off the elevator to the end of the ortez). Start pregabalin 75 mg capsules. Take 1 capsule up to 3 times a day for nerve pain.

## 2022-06-30 LAB
ALBUMIN SERPL ELPH-MCNC: 4.1 G/DL (ref 2.9–4.4)
ALBUMIN/GLOB SERPL: 1.4 {RATIO} (ref 0.7–1.7)
ALPHA1 GLOB SERPL ELPH-MCNC: 0.2 G/DL (ref 0–0.4)
ALPHA2 GLOB SERPL ELPH-MCNC: 0.7 G/DL (ref 0.4–1)
ANA SER QL: NEGATIVE
B-GLOBULIN SERPL ELPH-MCNC: 1 G/DL (ref 0.7–1.3)
ENA SS-A AB SER-ACNC: <0.2 AI (ref 0–0.9)
ENA SS-B AB SER-ACNC: <0.2 AI (ref 0–0.9)
GAMMA GLOB SERPL ELPH-MCNC: 1.1 G/DL (ref 0.4–1.8)
GLOBULIN SER CALC-MCNC: 3 G/DL (ref 2.2–3.9)
M PROTEIN SERPL ELPH-MCNC: NORMAL G/DL
PROT SERPL-MCNC: 7.1 G/DL (ref 6–8.5)
RHEUMATOID FACT SER QL LA: NEGATIVE

## 2022-07-06 ENCOUNTER — TELEPHONE (OUTPATIENT)
Dept: NEUROLOGY | Age: 52
End: 2022-07-06

## 2022-07-06 DIAGNOSIS — E11.65 TYPE 2 DIABETES MELLITUS WITH HYPERGLYCEMIA, WITHOUT LONG-TERM CURRENT USE OF INSULIN (HCC): Primary | ICD-10-CM

## 2022-07-07 ENCOUNTER — TELEPHONE (OUTPATIENT)
Dept: NEUROLOGY | Age: 52
End: 2022-07-07

## 2022-07-07 NOTE — TELEPHONE ENCOUNTER
Patient called to check the status of her prescription, states that it has not been filled yet.  The PA has been approved, patient is requesting the prescription for the Pregabalin 75 mg be sent in again:      [facility][id number][facility] [address] [place] [address],[address] Floor, [address] [place] [address]   Phone:  [phone]  Fax:  [phone]

## 2022-07-07 NOTE — TELEPHONE ENCOUNTER
Called and spoke with pt. Informed her pharmacy has rx and they are waiting for shipment to come in. Pharmacy will have medication tomorrow 07/08/22 and pt is aware.

## 2022-08-03 DIAGNOSIS — E11.65 TYPE 2 DIABETES MELLITUS WITH HYPERGLYCEMIA, WITHOUT LONG-TERM CURRENT USE OF INSULIN (HCC): Primary | ICD-10-CM

## 2022-08-03 DIAGNOSIS — E78.2 MIXED HYPERLIPIDEMIA: ICD-10-CM

## 2022-08-03 DIAGNOSIS — I10 ESSENTIAL HYPERTENSION: ICD-10-CM

## 2022-08-03 DIAGNOSIS — E03.9 ACQUIRED HYPOTHYROIDISM: ICD-10-CM

## 2022-08-08 ENCOUNTER — NURSE ONLY (OUTPATIENT)
Dept: INTERNAL MEDICINE CLINIC | Facility: CLINIC | Age: 52
End: 2022-08-08

## 2022-08-08 DIAGNOSIS — E03.9 ACQUIRED HYPOTHYROIDISM: ICD-10-CM

## 2022-08-08 DIAGNOSIS — E11.65 TYPE 2 DIABETES MELLITUS WITH HYPERGLYCEMIA, WITHOUT LONG-TERM CURRENT USE OF INSULIN (HCC): ICD-10-CM

## 2022-08-08 DIAGNOSIS — E78.2 MIXED HYPERLIPIDEMIA: ICD-10-CM

## 2022-08-08 DIAGNOSIS — I10 ESSENTIAL HYPERTENSION: ICD-10-CM

## 2022-08-09 LAB
ALBUMIN SERPL-MCNC: 4.4 G/DL (ref 3.5–5)
ALBUMIN/GLOB SERPL: 1.3 {RATIO} (ref 1.2–3.5)
ALP SERPL-CCNC: 54 U/L (ref 50–136)
ALT SERPL-CCNC: 18 U/L (ref 12–65)
ANION GAP SERPL CALC-SCNC: 2 MMOL/L (ref 7–16)
APPEARANCE UR: ABNORMAL
AST SERPL-CCNC: 10 U/L (ref 15–37)
BACTERIA URNS QL MICRO: ABNORMAL /HPF
BASOPHILS # BLD: 0.1 K/UL (ref 0–0.2)
BASOPHILS NFR BLD: 1 % (ref 0–2)
BILIRUB SERPL-MCNC: 0.4 MG/DL (ref 0.2–1.1)
BILIRUB UR QL: NEGATIVE
BUN SERPL-MCNC: 13 MG/DL (ref 6–23)
CALCIUM SERPL-MCNC: 9.2 MG/DL (ref 8.3–10.4)
CASTS URNS QL MICRO: 0 /LPF
CHLORIDE SERPL-SCNC: 108 MMOL/L (ref 98–107)
CHOLEST SERPL-MCNC: 192 MG/DL
CO2 SERPL-SCNC: 28 MMOL/L (ref 21–32)
COLOR UR: ABNORMAL
CREAT SERPL-MCNC: 1 MG/DL (ref 0.6–1)
CRYSTALS URNS QL MICRO: 0 /LPF
DIFFERENTIAL METHOD BLD: ABNORMAL
EOSINOPHIL # BLD: 0.2 K/UL (ref 0–0.8)
EOSINOPHIL NFR BLD: 3 % (ref 0.5–7.8)
EPI CELLS #/AREA URNS HPF: ABNORMAL /HPF
ERYTHROCYTE [DISTWIDTH] IN BLOOD BY AUTOMATED COUNT: 13.7 % (ref 11.9–14.6)
EST. AVERAGE GLUCOSE BLD GHB EST-MCNC: 128 MG/DL
GLOBULIN SER CALC-MCNC: 3.4 G/DL (ref 2.3–3.5)
GLUCOSE SERPL-MCNC: 93 MG/DL (ref 65–100)
GLUCOSE UR STRIP.AUTO-MCNC: NEGATIVE MG/DL
HBA1C MFR BLD: 6.1 % (ref 4.8–5.6)
HCT VFR BLD AUTO: 44.1 % (ref 35.8–46.3)
HDLC SERPL-MCNC: 59 MG/DL (ref 40–60)
HDLC SERPL: 3.3 {RATIO}
HGB BLD-MCNC: 13.6 G/DL (ref 11.7–15.4)
HGB UR QL STRIP: ABNORMAL
IMM GRANULOCYTES # BLD AUTO: 0 K/UL (ref 0–0.5)
IMM GRANULOCYTES NFR BLD AUTO: 0 % (ref 0–5)
KETONES UR QL STRIP.AUTO: NEGATIVE MG/DL
LDLC SERPL CALC-MCNC: 106.8 MG/DL
LEUKOCYTE ESTERASE UR QL STRIP.AUTO: NEGATIVE
LYMPHOCYTES # BLD: 2.2 K/UL (ref 0.5–4.6)
LYMPHOCYTES NFR BLD: 33 % (ref 13–44)
MCH RBC QN AUTO: 31.1 PG (ref 26.1–32.9)
MCHC RBC AUTO-ENTMCNC: 30.8 G/DL (ref 31.4–35)
MCV RBC AUTO: 100.9 FL (ref 79.6–97.8)
MONOCYTES # BLD: 0.4 K/UL (ref 0.1–1.3)
MONOCYTES NFR BLD: 6 % (ref 4–12)
MUCOUS THREADS URNS QL MICRO: ABNORMAL /LPF
NEUTS SEG # BLD: 3.8 K/UL (ref 1.7–8.2)
NEUTS SEG NFR BLD: 56 % (ref 43–78)
NITRITE UR QL STRIP.AUTO: NEGATIVE
NRBC # BLD: 0 K/UL (ref 0–0.2)
OTHER OBSERVATIONS: ABNORMAL
PH UR STRIP: 5.5 [PH] (ref 5–9)
PLATELET # BLD AUTO: 303 K/UL (ref 150–450)
PMV BLD AUTO: 10.6 FL (ref 9.4–12.3)
POTASSIUM SERPL-SCNC: 4.5 MMOL/L (ref 3.5–5.1)
PROT SERPL-MCNC: 7.8 G/DL (ref 6.3–8.2)
PROT UR STRIP-MCNC: NEGATIVE MG/DL
RBC # BLD AUTO: 4.37 M/UL (ref 4.05–5.2)
RBC #/AREA URNS HPF: ABNORMAL /HPF
SODIUM SERPL-SCNC: 138 MMOL/L (ref 136–145)
SP GR UR REFRACTOMETRY: 1.01 (ref 1–1.02)
TRIGL SERPL-MCNC: 131 MG/DL (ref 35–150)
TSH, 3RD GENERATION: 1.41 UIU/ML (ref 0.36–3.74)
URINE CULTURE IF INDICATED: ABNORMAL
UROBILINOGEN UR QL STRIP.AUTO: 0.2 EU/DL (ref 0.2–1)
VLDLC SERPL CALC-MCNC: 26.2 MG/DL (ref 6–23)
WBC # BLD AUTO: 6.8 K/UL (ref 4.3–11.1)
WBC URNS QL MICRO: ABNORMAL /HPF

## 2022-08-10 ENCOUNTER — OFFICE VISIT (OUTPATIENT)
Dept: INTERNAL MEDICINE CLINIC | Facility: CLINIC | Age: 52
End: 2022-08-10
Payer: MEDICAID

## 2022-08-10 VITALS
BODY MASS INDEX: 30.53 KG/M2 | WEIGHT: 190 LBS | HEIGHT: 66 IN | OXYGEN SATURATION: 99 % | HEART RATE: 79 BPM | TEMPERATURE: 97.4 F | DIASTOLIC BLOOD PRESSURE: 80 MMHG | SYSTOLIC BLOOD PRESSURE: 145 MMHG

## 2022-08-10 DIAGNOSIS — G56.03 BILATERAL CARPAL TUNNEL SYNDROME: ICD-10-CM

## 2022-08-10 DIAGNOSIS — I10 ESSENTIAL HYPERTENSION: ICD-10-CM

## 2022-08-10 DIAGNOSIS — E07.9 THYROID DISEASE: ICD-10-CM

## 2022-08-10 DIAGNOSIS — E78.2 MIXED HYPERLIPIDEMIA: ICD-10-CM

## 2022-08-10 DIAGNOSIS — E11.40 CONTROLLED TYPE 2 DIABETES WITH NEUROPATHY (HCC): Primary | ICD-10-CM

## 2022-08-10 DIAGNOSIS — Z76.0 MEDICATION REFILL: ICD-10-CM

## 2022-08-10 PROCEDURE — 3044F HG A1C LEVEL LT 7.0%: CPT | Performed by: NURSE PRACTITIONER

## 2022-08-10 PROCEDURE — 99214 OFFICE O/P EST MOD 30 MIN: CPT | Performed by: NURSE PRACTITIONER

## 2022-08-10 RX ORDER — ERYTHROMYCIN 5 MG/G
OINTMENT OPHTHALMIC
Qty: 1 EACH | Refills: 1 | Status: SHIPPED | OUTPATIENT
Start: 2022-08-10

## 2022-08-10 ASSESSMENT — ENCOUNTER SYMPTOMS
ABDOMINAL DISTENTION: 0
CONSTIPATION: 0
EYE REDNESS: 0
COLOR CHANGE: 0
SINUS PAIN: 0
APNEA: 0
COUGH: 0
BACK PAIN: 0
EYE DISCHARGE: 0
DIARRHEA: 0
EYE ITCHING: 0
NAUSEA: 0
EYE PAIN: 0
ABDOMINAL PAIN: 0
SHORTNESS OF BREATH: 0

## 2022-08-10 ASSESSMENT — PATIENT HEALTH QUESTIONNAIRE - PHQ9
SUM OF ALL RESPONSES TO PHQ QUESTIONS 1-9: 0
SUM OF ALL RESPONSES TO PHQ QUESTIONS 1-9: 0
2. FEELING DOWN, DEPRESSED OR HOPELESS: 0
SUM OF ALL RESPONSES TO PHQ QUESTIONS 1-9: 0
SUM OF ALL RESPONSES TO PHQ QUESTIONS 1-9: 0
SUM OF ALL RESPONSES TO PHQ9 QUESTIONS 1 & 2: 0
1. LITTLE INTEREST OR PLEASURE IN DOING THINGS: 0

## 2022-08-10 NOTE — PROGRESS NOTES
[unfilled]  [address] [place] [id number]      PROGRESS NOTE    SUBJECTIVE:   Jany Floyd is a 46 y.o. female seen for a follow up visit regarding the following chief complaint:     Chief Complaint   Patient presents with    Diabetes       Diabetes  Pertinent negatives for hypoglycemia include no dizziness, headaches or nervousness/anxiousness. Pertinent negatives for diabetes include no chest pain, no fatigue and no weakness. Patient presents for follow up diabetes management. She is following orthopedic routinely for trigger finger, cervical radiculopathy and CTS. Planning spinal injections next week. Diabetes: [id number] from previous to 6.1%  She is compliant with current medications and denies any intolerance. ACE/ARB-yes    Concerns of neuropathy in feet. She did have a nerve function test in lower extremities that showed neuropathy. Requesting referral for diabetic shoes. She has additional concerns of tinnitus for 3 months in left year. Denies dizziness or pain. Requesting refills of erythromycin prn eye infections. Current Outpatient Medications   Medication Sig Dispense Refill    Multiple Vitamins-Minerals (MULTIVITAMIN ADULT EXTRA C PO) Take 1 tablet by mouth      erythromycin (ROMYCIN) 5 MG/GM ophthalmic ointment Apply thin layer to lower right eyelid BID for 7 days. 1 each 1    blood glucose test strips (ASCENSIA AUTODISC VI;ONE TOUCH ULTRA TEST VI) strip 1 each by In Vitro route daily As needed. 100 each 3    pregabalin (LYRICA) 75 MG capsule Take 1 capsule by mouth 3 times daily for 90 days.  90 capsule 2    levothyroxine (SYNTHROID) 25 MCG tablet Take 1 tablet by mouth every morning (before breakfast) 90 tablet 0    metFORMIN (GLUCOPHAGE) 500 MG tablet Take 1 tablet by mouth 2 times daily (with meals) TAKE 1 TABLET BY MOUTH TWICE A  tablet 0    atenolol (TENORMIN) 25 MG tablet Take 1 tablet by mouth daily 90 tablet 0    HYDROcodone-acetaminophen labs/PE.          Ashley Lyons NP, APRN - CNP

## 2022-08-11 LAB
BACTERIA SPEC CULT: NORMAL
SERVICE CMNT-IMP: NORMAL

## 2022-09-07 ENCOUNTER — TELEPHONE (OUTPATIENT)
Dept: INTERNAL MEDICINE CLINIC | Facility: CLINIC | Age: 52
End: 2022-09-07

## 2022-09-07 NOTE — TELEPHONE ENCOUNTER
----- Message from [phone] sent at 9/7/2022 10:23 AM EDT -----  Subject: Referral Request    Reason for referral request? The patient wants a referral to a podiatrist   that can see her for an appt this year. The patient indicated the previous   referral for [facility] is not seeing new patients until next year. She   wants to be seen this year. She does not know who is in network with her   insurance but would like another provider. Provider patient wants to be referred to(if known):     Provider Phone Number(if known): Additional Information for Provider? Please call the patient back.    ---------------------------------------------------------------------------  --------------  Fre[facility] MOTA    [phone]; OK to leave message on voicemail  ---------------------------------------------------------------------------  --------------

## 2022-09-07 NOTE — TELEPHONE ENCOUNTER
Pt states that [facility] is not accepting new pt's. Can you send her referral to somewhere different?

## 2022-09-08 ENCOUNTER — TELEPHONE (OUTPATIENT)
Dept: INTERNAL MEDICINE CLINIC | Facility: CLINIC | Age: 52
End: 2022-09-08

## 2022-09-08 NOTE — TELEPHONE ENCOUNTER
Pt wanted to let you know that she started back on her Zyrtec because she was still experiencing tinnitus when she wasn't taking it.

## 2022-09-08 NOTE — TELEPHONE ENCOUNTER
Per Carrie Baron, I sent referral to [address] [place] [address]. (But I did call Dr Amy Drake pod) They are still taking patients, they are just booked out to 01/2023.  Hope

## 2022-09-20 ENCOUNTER — TELEPHONE (OUTPATIENT)
Dept: INTERNAL MEDICINE CLINIC | Facility: CLINIC | Age: 52
End: 2022-09-20

## 2022-09-28 ENCOUNTER — OFFICE VISIT (OUTPATIENT)
Dept: INTERNAL MEDICINE CLINIC | Facility: CLINIC | Age: 52
End: 2022-09-28
Payer: MEDICAID

## 2022-09-28 VITALS
DIASTOLIC BLOOD PRESSURE: 90 MMHG | HEART RATE: 80 BPM | OXYGEN SATURATION: 98 % | WEIGHT: 194 LBS | BODY MASS INDEX: 31.18 KG/M2 | SYSTOLIC BLOOD PRESSURE: 150 MMHG | HEIGHT: 66 IN | TEMPERATURE: 97.2 F

## 2022-09-28 DIAGNOSIS — J01.00 ACUTE NON-RECURRENT MAXILLARY SINUSITIS: Primary | ICD-10-CM

## 2022-09-28 PROCEDURE — 99213 OFFICE O/P EST LOW 20 MIN: CPT | Performed by: NURSE PRACTITIONER

## 2022-09-28 RX ORDER — PREGABALIN 100 MG/1
CAPSULE ORAL
COMMUNITY
End: 2022-09-28

## 2022-09-28 RX ORDER — AZITHROMYCIN 250 MG/1
250 TABLET, FILM COATED ORAL SEE ADMIN INSTRUCTIONS
Qty: 6 TABLET | Refills: 0 | Status: SHIPPED | OUTPATIENT
Start: 2022-09-28 | End: 2022-10-03

## 2022-09-28 ASSESSMENT — ENCOUNTER SYMPTOMS
EYE ITCHING: 0
EYE DISCHARGE: 0
NAUSEA: 0
CONSTIPATION: 0
DIARRHEA: 0
EYE REDNESS: 0
ABDOMINAL DISTENTION: 0
BACK PAIN: 0
SHORTNESS OF BREATH: 0
ABDOMINAL PAIN: 0
SINUS PAIN: 1
COLOR CHANGE: 0
EYE PAIN: 0
APNEA: 0
COUGH: 0
SINUS COMPLAINT: 1

## 2022-09-28 ASSESSMENT — PATIENT HEALTH QUESTIONNAIRE - PHQ9
SUM OF ALL RESPONSES TO PHQ9 QUESTIONS 1 & 2: 0
SUM OF ALL RESPONSES TO PHQ QUESTIONS 1-9: 0
SUM OF ALL RESPONSES TO PHQ QUESTIONS 1-9: 0
1. LITTLE INTEREST OR PLEASURE IN DOING THINGS: 0
SUM OF ALL RESPONSES TO PHQ QUESTIONS 1-9: 0
SUM OF ALL RESPONSES TO PHQ QUESTIONS 1-9: 0
2. FEELING DOWN, DEPRESSED OR HOPELESS: 0

## 2022-09-28 NOTE — PROGRESS NOTES
[unfilled]  [address] [place] [id number]      PROGRESS NOTE    SUBJECTIVE:   Megan Caballero is a 46 y.o. female seen for a follow up visit regarding the following chief complaint:     Chief Complaint   Patient presents with    Tinnitus     L ear    Sinus Problem       Sinus Problem  Associated symptoms include congestion and ear pain. Pertinent negatives include no chills, coughing, headaches, neck pain or shortness of breath. Patient presents with concerns of left ear ringing and sinus pressure for about 4 weeks. She tried oral antihistamine OTC for one month but symptoms worsened and have not resolved. Denies fever or sore throat. Current Outpatient Medications   Medication Sig Dispense Refill    azithromycin (ZITHROMAX) 250 MG tablet Take 1 tablet by mouth See Admin Instructions for 5 days 500mg on day 1 followed by 250mg on days 2 - 5 6 tablet 0    Multiple Vitamins-Minerals (MULTIVITAMIN ADULT EXTRA C PO) Take 1 tablet by mouth      erythromycin (ROMYCIN) 5 MG/GM ophthalmic ointment Apply thin layer to lower right eyelid BID for 7 days. 1 each 1    blood glucose test strips (ASCENSIA AUTODISC VI;ONE TOUCH ULTRA TEST VI) strip 1 each by In Vitro route daily As needed. 100 each 3    levothyroxine (SYNTHROID) 25 MCG tablet Take 1 tablet by mouth every morning (before breakfast) 90 tablet 0    metFORMIN (GLUCOPHAGE) 500 MG tablet Take 1 tablet by mouth 2 times daily (with meals) TAKE 1 TABLET BY MOUTH TWICE A  tablet 0    atenolol (TENORMIN) 25 MG tablet Take 1 tablet by mouth daily 90 tablet 0    HYDROcodone-acetaminophen (NORCO) 5-325 MG per tablet hydrocodone 5 mg-acetaminophen 325 mg tablet      metroNIDAZOLE (METROGEL) 0.75 % vaginal gel Use nightly in the vagina for 5 nights 70 g 5    Lancets MISC Check fasting BS daily.  Dx: T2DM      ascorbic acid (VITAMIN C) 500 MG tablet Take by mouth as needed       aspirin 81 MG EC tablet Take by mouth daily prn      cetirizine (ZYRTEC) 10 MG tablet TAKE 1 TABLET BY MOUTH EVERY DAY AT NIGHT      Ergocalciferol 50 MCG ( UT) TABS Take 1 capsule by mouth daily      Estradiol (VAGIFEM) 10 MCG TABS vaginal tablet Place 10 mcg vaginally Twice a Week      fluconazole (DIFLUCAN) 150 MG tablet 1 tablet now and repeat dose in 4 days      oxybutynin (DITROPAN) 5 MG tablet Take 5 mg by mouth 3 times daily      pantoprazole (PROTONIX) 40 MG tablet Take 40 mg by mouth daily      simvastatin (ZOCOR) 20 MG tablet Take 20 mg by mouth      pregabalin (LYRICA) 75 MG capsule Take 1 capsule by mouth 3 times daily for 90 days. 90 capsule 2    meloxicam (MOBIC) 7.5 MG tablet Take 7.5 mg by mouth daily (Patient not taking: No sig reported)      methocarbamol (ROBAXIN) 500 MG tablet Take 500 mg by mouth 3 times daily (Patient not taking: No sig reported)       No current facility-administered medications for this visit. Allergies   Allergen Reactions    Latex Other (See Comments)     Other reaction(s):  Other (see comments)      Other Swelling     Insect    Macadamia Nut Oil Hives     [place] Nuts    Penicillin G Nausea Only       Past Medical History:   Diagnosis Date    Anxiety     Arthritis     Bilateral carpal tunnel syndrome 2020    Chronic obstructive pulmonary disease (HCC)     Chronic pain     Contact dermatitis and other eczema, due to unspecified cause     Diabetes ([facility] [facility] [address]     Fibroids     Hypercholesterolemia     Hypertension     Trauma      Past Surgical History:   Procedure Laterality Date    CARPAL TUNNEL RELEASE Bilateral 2021     SECTION      x2    COLONOSCOPY N/A 9/3/2019    COLONOSCOPY /BMI 28 performed by Marino Roa MD at [facility] 5 Right     MYOMECTOMY      ORTHOPEDIC SURGERY Left     Broke ankle has screws in place    OTHER SURGICAL HISTORY      Cervical spine C-6-C&7     Family History   Problem Relation Age of Onset    Other Father     Diabetes Father     Hypertension Father Hypertension Mother     Cancer Paternal Grandmother     Osteoarthritis Mother     Heart Attack Paternal Grandmother     Hypertension Brother     Hypertension Sister     Other Sister         fibroids    Thyroid Disease Sister     Diabetes Paternal Grandmother         colon     Social History     Tobacco Use    Smoking status: Former    Smokeless tobacco: Never   Substance Use Topics    Alcohol use: Yes       Review of Systems   Constitutional:  Negative for activity change, appetite change, chills, fatigue and fever. HENT:  Positive for congestion, ear pain and sinus pain. Eyes:  Negative for pain, discharge, redness and itching. Respiratory:  Negative for apnea, cough and shortness of breath. Cardiovascular:  Negative for chest pain, palpitations and leg swelling. Gastrointestinal:  Negative for abdominal distention, abdominal pain, constipation, diarrhea and nausea. Endocrine: Negative for cold intolerance and heat intolerance. Genitourinary:  Negative for difficulty urinating, dysuria, enuresis and urgency. Musculoskeletal:  Negative for arthralgias, back pain, joint swelling, myalgias and neck pain. Skin:  Negative for color change and rash. Neurological:  Negative for dizziness, weakness and headaches. Psychiatric/Behavioral:  Negative for behavioral problems and sleep disturbance. The patient is not nervous/anxious. OBJECTIVE:  BP (!) 150/90 (Site: Left Upper Arm, Position: Sitting, Cuff Size: Small Adult)   Pulse 80   Temp 97.2 °F (36.2 °C) (Temporal)   Ht 5' 6\" (1.676 m)   Wt 194 lb (88 kg)   SpO2 98%   BMI 31.31 kg/m²      Physical Exam  Constitutional:       General: She is not in acute distress. Appearance: Normal appearance. She is not ill-appearing or toxic-appearing. HENT:      Right Ear: Tympanic membrane normal.      Ears:      Comments: Left TM with white fluid behind, slight retraction     Nose: Congestion present.       Mouth/Throat:      Mouth: Mucous membranes are moist.   Eyes:      Conjunctiva/sclera: Conjunctivae normal.   Cardiovascular:      Rate and Rhythm: Normal rate. Pulmonary:      Effort: Pulmonary effort is normal. No respiratory distress. Skin:     General: Skin is warm and dry. Neurological:      Mental Status: She is alert. Mental status is at baseline. Psychiatric:         Mood and Affect: Mood normal.         Behavior: Behavior normal.         Thought Content: Thought content normal.         ASSESSMENT and Antonio Coley was seen today for tinnitus and sinus problem. Diagnoses and all orders for this visit:    Acute non-recurrent maxillary sinusitis  -     azithromycin (ZITHROMAX) 250 MG tablet; Take 1 tablet by mouth See Admin Instructions for 5 days 500mg on day 1 followed by 250mg on days 2 - 5      We discussed starting on a zpack as prescribed. Continue antihistamine. Greater than 50% of this 15 min visit was spent counseling the patient about test results, prognosis, importance of compliance, education about disease process, benefits of medications, instructions for management of acute symptoms, and follow up plans. Follow-up and Dispositions    Return if symptoms worsen or fail to improve.          Meme Blanton NP, APRN - CNP

## 2022-10-05 ENCOUNTER — TELEPHONE (OUTPATIENT)
Dept: INTERNAL MEDICINE CLINIC | Facility: CLINIC | Age: 52
End: 2022-10-05

## 2022-10-05 NOTE — TELEPHONE ENCOUNTER
Pt saw Connor Grier on 9/28/22 for congestion and ear pain and was prescribed a zpack. Pt believes this is helping and would like another.

## 2022-10-11 ENCOUNTER — OFFICE VISIT (OUTPATIENT)
Dept: INTERNAL MEDICINE CLINIC | Facility: CLINIC | Age: 52
End: 2022-10-11
Payer: MEDICAID

## 2022-10-11 VITALS
HEIGHT: 66 IN | OXYGEN SATURATION: 98 % | TEMPERATURE: 97.5 F | RESPIRATION RATE: 16 BRPM | BODY MASS INDEX: 30.86 KG/M2 | WEIGHT: 192 LBS | SYSTOLIC BLOOD PRESSURE: 132 MMHG | HEART RATE: 76 BPM | DIASTOLIC BLOOD PRESSURE: 80 MMHG

## 2022-10-11 DIAGNOSIS — R82.90 URINE FINDING: Primary | ICD-10-CM

## 2022-10-11 DIAGNOSIS — J31.0 CHRONIC RHINITIS: ICD-10-CM

## 2022-10-11 DIAGNOSIS — R30.0 DYSURIA: ICD-10-CM

## 2022-10-11 DIAGNOSIS — H93.12 TINNITUS OF LEFT EAR: Primary | ICD-10-CM

## 2022-10-11 LAB
BILIRUBIN, URINE, POC: NEGATIVE
BLOOD URINE, POC: NEGATIVE
GLUCOSE URINE, POC: NEGATIVE
KETONES, URINE, POC: NEGATIVE
LEUKOCYTE ESTERASE, URINE, POC: NEGATIVE
NITRITE, URINE, POC: NEGATIVE
PH, URINE, POC: 6 (ref 4.6–8)
PROTEIN,URINE, POC: NEGATIVE
SPECIFIC GRAVITY, URINE, POC: 1.02 (ref 1–1.03)
URINALYSIS CLARITY, POC: NORMAL
URINALYSIS COLOR, POC: YELLOW
UROBILINOGEN, POC: 1

## 2022-10-11 PROCEDURE — 81003 URINALYSIS AUTO W/O SCOPE: CPT | Performed by: NURSE PRACTITIONER

## 2022-10-11 PROCEDURE — 99213 OFFICE O/P EST LOW 20 MIN: CPT | Performed by: NURSE PRACTITIONER

## 2022-10-11 ASSESSMENT — ENCOUNTER SYMPTOMS
COUGH: 0
VOMITING: 0
ABDOMINAL PAIN: 0
SINUS PAIN: 1
SHORTNESS OF BREATH: 0
WHEEZING: 0
RHINORRHEA: 1
NAUSEA: 0
SINUS PRESSURE: 1
DIARRHEA: 0
SORE THROAT: 0

## 2022-10-11 ASSESSMENT — PATIENT HEALTH QUESTIONNAIRE - PHQ9
SUM OF ALL RESPONSES TO PHQ QUESTIONS 1-9: 0
SUM OF ALL RESPONSES TO PHQ QUESTIONS 1-9: 0
1. LITTLE INTEREST OR PLEASURE IN DOING THINGS: 0
SUM OF ALL RESPONSES TO PHQ QUESTIONS 1-9: 0
SUM OF ALL RESPONSES TO PHQ9 QUESTIONS 1 & 2: 0
SUM OF ALL RESPONSES TO PHQ QUESTIONS 1-9: 0
2. FEELING DOWN, DEPRESSED OR HOPELESS: 0

## 2022-10-11 NOTE — PROGRESS NOTES
[facility]      10/11/2022    Patient Name: Rani Rincon  :  1970      Chief Complaint:  Chief Complaint   Patient presents with    Pharyngitis    Sinus Problem         HPI  Patient presents today with complaint of chronic left ear tinnitus/fullness and sinus congestion/drainage. She says that symptoms have been present 5-6 months. She was seen about 2 weeks ago and was treated for a sinus infection. Symptoms did not improve with treatment. She denies any fever, chills, SOB or wheezing. She has been taking Zyrtec and an OTC sinus medication. She is requesting further evaluation today. Patient also with complaint of dysuria x 1 week. Thinking that it may be a yeast infection, patient took a fluconazole yesterday.      Past Medical History:   Diagnosis Date    Anxiety     Arthritis     Bilateral carpal tunnel syndrome 2020    Chronic obstructive pulmonary disease (HCC)     Chronic pain     Contact dermatitis and other eczema, due to unspecified cause     Diabetes ([facility] [address]     Fibroids     Hypercholesterolemia     Hypertension     Trauma        Past Surgical History:   Procedure Laterality Date    CARPAL TUNNEL RELEASE Bilateral 2021     SECTION      x2    COLONOSCOPY N/A 9/3/2019    COLONOSCOPY /BMI 28 performed by Jeremy Springer MD at [facility] 5 Right     MYOMECTOMY      ORTHOPEDIC SURGERY Left     Broke ankle has screws in place    OTHER SURGICAL HISTORY      Cervical spine C-6-C&7       Family History   Problem Relation Age of Onset    Other Father     Diabetes Father     Hypertension Father     Hypertension Mother     Cancer Paternal Grandmother     Osteoarthritis Mother     Heart Attack Paternal Grandmother     Hypertension Brother     Hypertension Sister     Other Sister         fibroids    Thyroid Disease Sister     Diabetes Paternal Grandmother         colon       Social History     Tobacco Use    Smoking status: Former Smokeless tobacco: Never   Vaping Use    Vaping Use: Never used   Substance Use Topics    Alcohol use: Yes    Drug use: No       Current Outpatient Medications:     Multiple Vitamins-Minerals (MULTIVITAMIN ADULT EXTRA C PO), Take 1 tablet by mouth, Disp: , Rfl:     erythromycin (ROMYCIN) 5 MG/GM ophthalmic ointment, Apply thin layer to lower right eyelid BID for 7 days. , Disp: 1 each, Rfl: 1    blood glucose test strips (ASCENSIA AUTODISC VI;ONE TOUCH ULTRA TEST VI) strip, 1 each by In Vitro route daily As needed. , Disp: 100 each, Rfl: 3    levothyroxine (SYNTHROID) 25 MCG tablet, Take 1 tablet by mouth every morning (before breakfast), Disp: 90 tablet, Rfl: 0    metFORMIN (GLUCOPHAGE) 500 MG tablet, Take 1 tablet by mouth 2 times daily (with meals) TAKE 1 TABLET BY MOUTH TWICE A DAY, Disp: 180 tablet, Rfl: 0    atenolol (TENORMIN) 25 MG tablet, Take 1 tablet by mouth daily, Disp: 90 tablet, Rfl: 0    HYDROcodone-acetaminophen (NORCO) 5-325 MG per tablet, hydrocodone 5 mg-acetaminophen 325 mg tablet, Disp: , Rfl:     metroNIDAZOLE (METROGEL) 0.75 % vaginal gel, Use nightly in the vagina for 5 nights, Disp: 70 g, Rfl: 5    Lancets MISC, Check fasting BS daily.  Dx: T2DM, Disp: , Rfl:     ascorbic acid (VITAMIN C) 500 MG tablet, Take by mouth as needed , Disp: , Rfl:     aspirin 81 MG EC tablet, Take by mouth daily prn, Disp: , Rfl:     cetirizine (ZYRTEC) 10 MG tablet, TAKE 1 TABLET BY MOUTH EVERY DAY AT NIGHT, Disp: , Rfl:     Ergocalciferol 50 MCG (2000 UT) TABS, Take 1 capsule by mouth daily, Disp: , Rfl:     Estradiol (VAGIFEM) 10 MCG TABS vaginal tablet, Place 10 mcg vaginally Twice a Week, Disp: , Rfl:     oxybutynin (DITROPAN) 5 MG tablet, Take 5 mg by mouth 3 times daily, Disp: , Rfl:     pantoprazole (PROTONIX) 40 MG tablet, Take 40 mg by mouth daily, Disp: , Rfl:     simvastatin (ZOCOR) 20 MG tablet, Take 20 mg by mouth, Disp: , Rfl:     pregabalin (LYRICA) 75 MG capsule, Take 1 capsule by mouth 3 times daily for 90 days. , Disp: 90 capsule, Rfl: 2    fluconazole (DIFLUCAN) 150 MG tablet, 1 tablet now and repeat dose in 4 days (Patient not taking: Reported on 10/11/2022), Disp: , Rfl:     meloxicam (MOBIC) 7.5 MG tablet, Take 7.5 mg by mouth daily (Patient not taking: No sig reported), Disp: , Rfl:     methocarbamol (ROBAXIN) 500 MG tablet, Take 500 mg by mouth 3 times daily (Patient not taking: No sig reported), Disp: , Rfl:     Allergies   Allergen Reactions    Latex Other (See Comments)     Other reaction(s): Other (see comments)      Other Swelling     Insect    Macadamia Nut Oil Hives     [place] Nuts    Penicillin G Nausea Only       Review of Systems   Constitutional:  Negative for chills and fever. HENT:  Positive for congestion, ear pain, postnasal drip, rhinorrhea, sinus pressure, sinus pain and tinnitus. Negative for ear discharge and sore throat. Respiratory:  Negative for cough, shortness of breath and wheezing. Cardiovascular:  Negative for chest pain. Gastrointestinal:  Negative for abdominal pain, diarrhea, nausea and vomiting. Genitourinary:  Positive for dysuria. Neurological:  Positive for headaches. Negative for dizziness. Objective:  /80 (Site: Left Upper Arm, Position: Sitting, Cuff Size: Large Adult)   Pulse 76   Temp 97.5 °F (36.4 °C) (Temporal)   Resp 16   Ht 5' 6\" (1.676 m)   Wt 192 lb (87.1 kg)   SpO2 98%   BMI 30.99 kg/m²     Examination:  Physical Exam  Vitals and nursing note reviewed. Constitutional:       General: She is not in acute distress. Appearance: Normal appearance. HENT:      Right Ear: Ear canal and external ear normal. There is impacted cerumen. Left Ear: Tympanic membrane, ear canal and external ear normal.  No middle ear effusion. Tympanic membrane is not erythematous. Nose: Mucosal edema present. Mouth/Throat:      Mouth: Mucous membranes are moist.      Pharynx: Oropharynx is clear.  No oropharyngeal exudate or posterior oropharyngeal erythema. Cardiovascular:      Rate and Rhythm: Normal rate and regular rhythm. Heart sounds: Normal heart sounds. Pulmonary:      Effort: Pulmonary effort is normal. No respiratory distress. Breath sounds: Normal breath sounds. Abdominal:      General: Bowel sounds are normal.      Palpations: Abdomen is soft. Skin:     General: Skin is warm and dry. Neurological:      Mental Status: She is alert and oriented to person, place, and time. Assessment/Plan:    Theda Mcardle was seen today for pharyngitis and sinus problem. Diagnoses and all orders for this visit:    Tinnitus of left ear  -     [facility]  Referral to ENT placed for further evaluation. Chronic rhinitis  -     [facility]  Recommended nasal steroid but patient reports being intolerant to nasal sprays. Recommended trial of Mucinex. Dysuria  UA in the office unremarkable. Call if symptoms worsen or do not improve. Follow-up and Dispositions    Return if symptoms worsen or fail to improve. On this date, 10/11/22, I have spent 25 minutes reviewing previous notes, test results and face to face with the patient discussing the diagnosis and importance of compliance with the treatment plan as well as documenting on the day of the visit. An electronic signature was used to authenticate this note.   AMELIA Rosario

## 2022-10-12 ENCOUNTER — HOSPITAL ENCOUNTER (OUTPATIENT)
Dept: MAMMOGRAPHY | Age: 52
Discharge: HOME | End: 2022-10-15
Payer: MEDICAID

## 2022-10-12 DIAGNOSIS — Z12.31 VISIT FOR SCREENING MAMMOGRAM: ICD-10-CM

## 2022-10-12 PROCEDURE — 77067 SCR MAMMO BI INCL CAD: CPT

## 2022-10-30 ASSESSMENT — ENCOUNTER SYMPTOMS
CONSTIPATION: 0
COUGH: 0
BACK PAIN: 1

## 2022-10-30 NOTE — PROGRESS NOTES
[address], [address] [facility], [address] [place] [address]  Phone: [phone] Fax [phone]  Samina Felipe MD      Patient: Gunnar Tobin  Provider: Samina Felipe MD    CC:   Chief Complaint   Patient presents with    Follow-up    Neurologic Problem     Referring Provider:    History of Present Illness: Tricia Baig is a 46 y.o. RH female who presents for follow-up of tremor and neuropathic pain. She is unaccompanied for today's visit. She was last seen June 2022. She presents for follow-up and continued management of chronic neuropathic pain, followed by pain management for both chronic cervical and lumbar pain with history of previous surgeries to the cervical spine. She has a length dependent peripheral neuropathy, mostly sensory, affecting the plantar nerves of the feet. There is a reported tremor of the hands which is intermittent and fluctuates during the day. No obvious objective findings have been noted. No concern for parkinsonism. Current medications include (but not limited to): Atenolol 25 mg daily  Robaxin 500 mg as needed  Meloxicam 7.5 mg daily  Pregabalin 75 mg TID  Hydrocodone-acetaminophen 5-325 mg as needed     Previous medication trials include: Gabapentin, amitriptyline Cymbalta     Patient presents today for follow-up. Overall symptoms have been relatively unchanged. She still reports a type of \"internal\" tremor that is never been very noticeable on examination. She also continues to have rather diffuse and generalized chronic pain. She does have evidence for a length dependent peripheral neuropathy and serum lab work-up for acquired causes has been unremarkable. Started pregabalin at the last visit and this has provided some mild benefit. She does note some mild swelling in the lower extremities. With the exception of this potential adverse effect, it appears to be well-tolerated.       Review of Systems:   Review of Systems Constitutional:  Negative for fever. HENT:  Negative for congestion. Eyes:  Negative for visual disturbance. Respiratory:  Negative for cough. Cardiovascular:  Negative for chest pain. Gastrointestinal:  Negative for constipation. Genitourinary:  Negative for dysuria. Musculoskeletal:  Positive for arthralgias, back pain, myalgias and neck pain. Skin:  Negative for rash. Neurological:  Positive for tremors and numbness. Psychiatric/Behavioral:  Negative for hallucinations. Lab/Imaging Review:   I REVIEWED PERTINENT LABS, IMAGES, AND REPORTS WITH THE PATIENT PERSONALLY, DIRECTLY AND FULLY. THE MOST PERTINENT FINDINGS ARE NOTED BELOW:    EMG/NCV March 2022:  INTERPRETATION: THESE FINDINGS ARE ELECTROPHYSIOLOGIC EVIDENCE OF PLANTAR NEUROPATHY THAT IS SYMMETRIC DISTAL IN THE LOWER EXTREMITIES ONLY WITHOUT OTHER EVIDENCE FOR NEUROPATHY. THIS WOULD BE CONSISTENT WITH THE LENGTH DEPENDENT DISTAL AND GRALISE SENSORY NEUROPATHY AT THE PRESENT TIME MIXED PROBABLY MOSTLY AXONAL-DEMYELINATING. NO MYOPATHY MYOTONIA OR FASCICULATIONS. CONCLUSION: Compatible with distal length dependent polyneuropathy but mainly a sensory neuropathy of the feet. Plantar nerves. EMG/NCV November 2020: This nerve conduction study showed neurophysiologic evidence of bilateral carpal tunnel syndrome, moderate near to severe in degree on the right and moderate on the left. Other nerves tested were normal.  Needle EMG to the left upper limb and bilateral APB was normal showing no evidence of left-sided C5/6/7/8/T1 radiculopathy or brachial plexopathy. No denervation changes in bilateral APB. MRI Cervical Spine Sept 2018: IMPRESSION:  1. Postsurgical changes at C6-7.  2.  Degenerative changes elsewhere maximal at C4-5 as detailed above     MRI Thoracic Spine September 2018: IMPRESSION:  Mild scoliosis. Otherwise unremarkable exam      MRI Brain May 2018: IMPRESSION: Normal MRI of the brain. MRI Lumbar Spine 2017:   IMPRESSION: L3-4: Moderate central stenosis due to hypertrophic facet arthropathy and mild annular bulge. Thecal sac measures 10 mm in AP diameter. No focal disc herniation. Past Medical History:     Past medical history, surgical history, social history, family history, medications, and allergies were reviewed and updated as appropriate. PAST MEDICAL HISTORY:  Past Medical History:   Diagnosis Date    Anxiety     Arthritis     Bilateral carpal tunnel syndrome 2020    Chronic obstructive pulmonary disease (HCC)     Chronic pain     Contact dermatitis and other eczema, due to unspecified cause     Diabetes ([facility] [address]     Fibroids     Hypercholesterolemia     Hypertension     Trauma      PAST SURGICAL HISTORY:   Past Surgical History:   Procedure Laterality Date    CARPAL TUNNEL RELEASE Bilateral 2021     SECTION      x2    COLONOSCOPY N/A 9/3/2019    COLONOSCOPY /BMI 28 performed by Valeen Pallas, MD at [address] Right     MYOMECTOMY      ORTHOPEDIC SURGERY Left     Broke ankle has screws in place    OTHER SURGICAL HISTORY      Cervical spine C-6-C&7     FAMILY HISTORY:  Family History   Problem Relation Age of Onset    Other Father     Diabetes Father     Hypertension Father     Hypertension Mother     Cancer Paternal Grandmother     Osteoarthritis Mother     Heart Attack Paternal Grandmother     Hypertension Brother     Hypertension Sister     Other Sister         fibroids    Thyroid Disease Sister     Diabetes Paternal Grandmother         colon      SOCIAL HISTORY:  Social History     Socioeconomic History    Marital status: Single     Spouse name: None    Number of children: None    Years of education: None    Highest education level: None   Tobacco Use    Smoking status: Former    Smokeless tobacco: Never   Vaping Use    Vaping Use: Never used   Substance and Sexual Activity    Alcohol use: Yes    Drug use:  No taking: No sig reported)      meloxicam (MOBIC) 7.5 MG tablet Take 7.5 mg by mouth daily (Patient not taking: No sig reported)      methocarbamol (ROBAXIN) 500 MG tablet Take 500 mg by mouth 3 times daily (Patient not taking: No sig reported)       No facility-administered encounter medications on file as of 10/31/2022. ALLERGIES:  Allergies   Allergen Reactions    Latex Other (See Comments)     Other reaction(s): Other (see comments)      Other Swelling     Insect    Macadamia Nut Oil Hives     [place] Nuts    Penicillin G Nausea Only       Physical Exam:     BP (!) 144/77   Pulse 96   Ht 5' 6\" (1.676 m)   Wt 193 lb (87.5 kg)   BMI 31.15 kg/m²     General Exam:  General: Well developed, well nourished, in no apparent distress. HEENT: Normocephalic, atraumatic. Sclera anicteric. Oropharynx clear. Neck: Supple without masses  Cardiovascular: Regular rate and rhythm. No carotid bruits. Lungs: Non-labored breathing. Abdomen: Soft, nontender, nondistended. Extremities: Peripheral pulses intact. No edema and no rashes. Neurological Exam:      MS/Language/Speech:  Alert. Oriented x 3. Language fluent. Speech normal.      Cranial Nerves: PERRL. Eye movements full with normal pursuits. No nystagmus. Face was symmetric with good activation and normal sensation. Tongue and palate were midline. Shoulder shrug symmetric. Motor: Strength was full in all proximal and distal muscle groups. Tone was normal.  Rapid alternating movements showed no evidence of slowing or amplitude decrement. Abnormal Movements: No tremor at rest.  There is no tremor with posture or with finger-to-nose bilaterally. Sensory: Intact to light touch and vibration in the distal lower extremities. Cerebellar: No ataxia or dysmetria. Reflexes (R/L): Biceps (2+/2+), Brachioradialis (2+/2+), Patellar (2+/2+), Ankle (2+/2+). Gait: Can rise from a seated position without difficulty.  Posture normal. Romberg was normal. Gait showed normal base with normal stride length. No difficulty turning. Arm swing was normal.       Assessment and Plan:     Trenton Gutierrez is a 46 y.o. female who presents with the following issues: Cheryl Nuñez was seen today for follow-up and neurologic problem. Diagnoses and all orders for this visit:    Peripheral polyneuropathy  -     pregabalin (LYRICA) 100 MG capsule; Take 1 capsule by mouth 3 times daily for 180 days. Neuropathic pain  -     pregabalin (LYRICA) 100 MG capsule; Take 1 capsule by mouth 3 times daily for 180 days. Tremors of nervous system      Patient presents for follow-up and continued management of what is reported to be a \"internal\" tremor with no obvious abnormal movements on examination. No obvious findings for essential tremor or parkinsonism. Symptoms may be caused by some underlying anxiety and also appear to be coupled to some degree to her chronic pain. Review of recent nerve conduction studies of the lower extremities showed evidence of a mostly sensory length dependent peripheral neuropathy affecting the plantar nerves of both feet. Serum lab work-up for other acquired causes has been unremarkable. Increase pregabalin to 100 mg 3 times a day for neuropathic pain. She also reports this has been somewhat beneficial for her tremor. Continue to monitor for worsening adverse effects, such as lower extremity swelling. Recall that she has reportedly failed trials of gabapentin and amitriptyline in the past.    She will continue follow-up with her pain management providers for treatment of her chronic neck and lumbar spine pain. I have reviewed these images and agree that no surgical intervention is necessary at this time. Follow up in 6 months.        Signature: Alberto Leavitt MD      Date:  10/31/2022    [facility]   [address], [address]  Ph: [phone]  Fax: [phone]         I have personally interviewed and examined Mrs. Kori Kincaid and I have personally reviewed all relevant records including labs and imaging as noted above. I have written all aspects of this note. More than 50% of this time was used for counseling regarding my diagnosis, prognosis, and plans for management. Total visit time: 33 minutes.

## 2022-10-31 ENCOUNTER — OFFICE VISIT (OUTPATIENT)
Dept: NEUROLOGY | Age: 52
End: 2022-10-31
Payer: MEDICAID

## 2022-10-31 VITALS
BODY MASS INDEX: 31.02 KG/M2 | HEART RATE: 96 BPM | DIASTOLIC BLOOD PRESSURE: 77 MMHG | HEIGHT: 66 IN | WEIGHT: 193 LBS | SYSTOLIC BLOOD PRESSURE: 144 MMHG

## 2022-10-31 DIAGNOSIS — G62.9 PERIPHERAL POLYNEUROPATHY: Primary | ICD-10-CM

## 2022-10-31 DIAGNOSIS — M79.2 NEUROPATHIC PAIN: ICD-10-CM

## 2022-10-31 DIAGNOSIS — R25.1 TREMORS OF NERVOUS SYSTEM: ICD-10-CM

## 2022-10-31 PROCEDURE — 99214 OFFICE O/P EST MOD 30 MIN: CPT | Performed by: PSYCHIATRY & NEUROLOGY

## 2022-10-31 PROCEDURE — 3074F SYST BP LT 130 MM HG: CPT | Performed by: PSYCHIATRY & NEUROLOGY

## 2022-10-31 PROCEDURE — 3078F DIAST BP <80 MM HG: CPT | Performed by: PSYCHIATRY & NEUROLOGY

## 2022-10-31 RX ORDER — PREGABALIN 100 MG/1
100 CAPSULE ORAL 3 TIMES DAILY
Qty: 90 CAPSULE | Refills: 5 | Status: SHIPPED | OUTPATIENT
Start: 2022-10-31 | End: 2023-04-29

## 2022-11-30 DIAGNOSIS — E11.65 TYPE 2 DIABETES MELLITUS WITH HYPERGLYCEMIA, WITHOUT LONG-TERM CURRENT USE OF INSULIN (HCC): ICD-10-CM

## 2022-11-30 DIAGNOSIS — E03.9 ACQUIRED HYPOTHYROIDISM: ICD-10-CM

## 2022-11-30 DIAGNOSIS — I10 ESSENTIAL HYPERTENSION: ICD-10-CM

## 2022-11-30 RX ORDER — LEVOTHYROXINE SODIUM 0.03 MG/1
TABLET ORAL
Qty: 30 TABLET | OUTPATIENT
Start: 2022-11-30

## 2022-11-30 RX ORDER — ATENOLOL 25 MG/1
TABLET ORAL
Qty: 30 TABLET | OUTPATIENT
Start: 2022-11-30

## 2022-12-06 ENCOUNTER — OFFICE VISIT (OUTPATIENT)
Dept: ENT CLINIC | Age: 52
End: 2022-12-06
Payer: MEDICAID

## 2022-12-06 VITALS
SYSTOLIC BLOOD PRESSURE: 122 MMHG | BODY MASS INDEX: 30.53 KG/M2 | HEIGHT: 66 IN | WEIGHT: 190 LBS | DIASTOLIC BLOOD PRESSURE: 60 MMHG

## 2022-12-06 DIAGNOSIS — J30.9 ALLERGIC RHINITIS, UNSPECIFIED SEASONALITY, UNSPECIFIED TRIGGER: Primary | ICD-10-CM

## 2022-12-06 DIAGNOSIS — H93.13 TINNITUS OF BOTH EARS: ICD-10-CM

## 2022-12-06 DIAGNOSIS — H90.3 SENSORINEURAL HEARING LOSS (SNHL) OF BOTH EARS: ICD-10-CM

## 2022-12-06 DIAGNOSIS — H69.81 ETD (EUSTACHIAN TUBE DYSFUNCTION), RIGHT: ICD-10-CM

## 2022-12-06 DIAGNOSIS — K21.9 LARYNGOPHARYNGEAL REFLUX (LPR): ICD-10-CM

## 2022-12-06 DIAGNOSIS — H61.21 EXCESSIVE EAR WAX, RIGHT: ICD-10-CM

## 2022-12-06 DIAGNOSIS — H90.3 SENSORINEURAL HEARING LOSS, BILATERAL: Primary | ICD-10-CM

## 2022-12-06 PROCEDURE — 92557 COMPREHENSIVE HEARING TEST: CPT | Performed by: AUDIOLOGIST

## 2022-12-06 PROCEDURE — 99204 OFFICE O/P NEW MOD 45 MIN: CPT | Performed by: STUDENT IN AN ORGANIZED HEALTH CARE EDUCATION/TRAINING PROGRAM

## 2022-12-06 PROCEDURE — 3078F DIAST BP <80 MM HG: CPT | Performed by: STUDENT IN AN ORGANIZED HEALTH CARE EDUCATION/TRAINING PROGRAM

## 2022-12-06 PROCEDURE — 3074F SYST BP LT 130 MM HG: CPT | Performed by: STUDENT IN AN ORGANIZED HEALTH CARE EDUCATION/TRAINING PROGRAM

## 2022-12-06 PROCEDURE — 69210 REMOVE IMPACTED EAR WAX UNI: CPT | Performed by: STUDENT IN AN ORGANIZED HEALTH CARE EDUCATION/TRAINING PROGRAM

## 2022-12-06 PROCEDURE — 92567 TYMPANOMETRY: CPT | Performed by: AUDIOLOGIST

## 2022-12-06 RX ORDER — MONTELUKAST SODIUM 10 MG/1
10 TABLET ORAL DAILY
Qty: 60 TABLET | Refills: 5 | Status: SHIPPED | OUTPATIENT
Start: 2022-12-06

## 2022-12-06 ASSESSMENT — ENCOUNTER SYMPTOMS
COUGH: 0
ABDOMINAL PAIN: 0
EYE DISCHARGE: 0

## 2022-12-06 NOTE — PROGRESS NOTES
[address] ENT Office Note    Patient: Mahesh Lomeli  MRN: [id number]  : 1970  Gender:  female  Vital Signs: /60 (Site: Left Upper Arm, Position: Sitting)   Ht 5' 6\" (1.676 m)   Wt 190 lb (86.2 kg)   BMI 30.67 kg/m²   Date: 2022    CC:   Chief Complaint   Patient presents with    Tinnitus    Ear Problem     Patient here for chiming /ringing in her ears. HPI:  Mahesh Lomeli is a 45 y.o. female who endorses ringing in her ears, L>R, since May. She notes slight decrease in hearing. She endorses headache which she treats w/ Tylenol. She has loud noise exposure. She is unsure if she grinds her teeth or clenches her jaw on her sleep. She also endorses bilateral ear pressure, left greater than right. She has tried intranasal steroids in the past but did not tolerate them. Past Medical History, Past Surgical History, Family history, Social History, and Medications were all reviewed with the patient today and updated as necessary. Allergies   Allergen Reactions    Latex Other (See Comments)     Other reaction(s):  Other (see comments)      Other Swelling     Insect    Macadamia Nut Oil Hives     [place] Nuts    Penicillin G Nausea Only    Lyrica [Pregabalin] Palpitations     Patient Active Problem List   Diagnosis    Essential hypertension    Thyroid disease    DDD (degenerative disc disease), cervical    Cervical radiculopathy    Impaired glucose tolerance    Mixed hyperlipidemia    Type 2 diabetes mellitus with hyperglycemia, without long-term current use of insulin (HCC)    Bilateral carpal tunnel syndrome     Current Outpatient Medications   Medication Sig    budesonide (RINOCORT AQUA) 32 MCG/ACT nasal spray 2 sprays by Each Nostril route 2 times daily    montelukast (SINGULAIR) 10 MG tablet Take 1 tablet by mouth daily    Multiple Vitamins-Minerals (MULTIVITAMIN ADULT EXTRA C PO) Take 1 tablet by mouth    erythromycin (ROMYCIN) 5 MG/GM ophthalmic ointment Apply thin layer to lower right eyelid BID for 7 days. blood glucose test strips (ASCENSIA AUTODISC VI;ONE TOUCH ULTRA TEST VI) strip 1 each by In Vitro route daily As needed. levothyroxine (SYNTHROID) 25 MCG tablet Take 1 tablet by mouth every morning (before breakfast)    metFORMIN (GLUCOPHAGE) 500 MG tablet Take 1 tablet by mouth 2 times daily (with meals) TAKE 1 TABLET BY MOUTH TWICE A DAY    atenolol (TENORMIN) 25 MG tablet Take 1 tablet by mouth daily    HYDROcodone-acetaminophen (NORCO) 5-325 MG per tablet hydrocodone 5 mg-acetaminophen 325 mg tablet    metroNIDAZOLE (METROGEL) 0.75 % vaginal gel Use nightly in the vagina for 5 nights    Lancets MISC Check fasting BS daily. Dx: T2DM    ascorbic acid (VITAMIN C) 500 MG tablet Take by mouth as needed     aspirin 81 MG EC tablet Take by mouth daily prn    cetirizine (ZYRTEC) 10 MG tablet TAKE 1 TABLET BY MOUTH EVERY DAY AT NIGHT    Ergocalciferol 50 MCG (2000 UT) TABS Take 1 capsule by mouth daily    Estradiol (VAGIFEM) 10 MCG TABS vaginal tablet Place 10 mcg vaginally Twice a Week    fluconazole (DIFLUCAN) 150 MG tablet 1 tablet now and repeat dose in 4 days    meloxicam (MOBIC) 7.5 MG tablet Take 7.5 mg by mouth daily    methocarbamol (ROBAXIN) 500 MG tablet Take 500 mg by mouth 3 times daily    pantoprazole (PROTONIX) 40 MG tablet Take 40 mg by mouth daily    simvastatin (ZOCOR) 20 MG tablet Take 20 mg by mouth    pregabalin (LYRICA) 100 MG capsule Take 1 capsule by mouth 3 times daily for 180 days. (Patient not taking: Reported on 12/6/2022)    oxybutynin (DITROPAN) 5 MG tablet Take 5 mg by mouth 3 times daily (Patient not taking: Reported on 12/6/2022)     No current facility-administered medications for this visit.      Past Medical History:   Diagnosis Date    Anxiety     Arthritis     Bilateral carpal tunnel syndrome 11/24/2020    Chronic obstructive pulmonary disease (HCC)     Chronic pain     Contact dermatitis and other eczema, due to unspecified cause Diabetes ([address]     Fibroids     Hypercholesterolemia     Hypertension     Trauma      Social History     Tobacco Use    Smoking status: Former    Smokeless tobacco: Never   Substance Use Topics    Alcohol use: Yes     Past Surgical History:   Procedure Laterality Date    CARPAL TUNNEL RELEASE Bilateral 2021     SECTION      x2    COLONOSCOPY N/A 9/3/2019    COLONOSCOPY /BMI 28 performed by Debi Young MD at [facility] Right     MYOMECTOMY      ORTHOPEDIC SURGERY Left     Broke ankle has screws in place    OTHER SURGICAL HISTORY      Cervical spine C-6-C&7     Family History   Problem Relation Age of Onset    Other Father     Diabetes Father     Hypertension Father     Hypertension Mother     Cancer Paternal Grandmother     Osteoarthritis Mother     Heart Attack Paternal Grandmother     Hypertension Brother     Hypertension Sister     Other Sister         fibroids    Thyroid Disease Sister     Diabetes Paternal Grandmother         colon        ROS:    Review of Systems   Constitutional:  Negative for fever. HENT:  Negative for ear discharge and ear pain. Eyes:  Negative for discharge. Respiratory:  Negative for cough. Gastrointestinal:  Negative for abdominal pain. Genitourinary:  Negative for difficulty urinating. Musculoskeletal:  Negative for arthralgias and neck pain. Skin:  Negative for rash. Allergic/Immunologic: Negative for environmental allergies. Neurological:  Positive for headaches. Negative for dizziness. Hematological:  Negative for adenopathy. Psychiatric/Behavioral:  Negative for agitation. PHYSICAL EXAM:    /60 (Site: Left Upper Arm, Position: Sitting)   Ht 5' 6\" (1.676 m)   Wt 190 lb (86.2 kg)   BMI 30.67 kg/m²     General: NAD, well-appearing  Neuro: No gross neuro deficits. CN's II-XII intact. No facial weakness. Eyes: EOMI. Pupils reactive. No periorbital edema/ecchymosis.    Skin: No facial erythema, rashes or concerning lesions. Nose: No external deviations or saddling. Intranasally, septum is midline without perforations, nasal mucosa appears healthy with no erythema, mucopurulence, or polyps. Mouth: Moist mucus membranes, normal tongue/palate mobility, no concerning mucosal lesions. Oropharynx: clear with no erythema/exudate, no tonsillar hypertrophy. Ears: Normal appearing auricles, no hematomas. EACs with right-sided cerumen impaction, healthy canal skin, TM's intact with no perforations or retraction pockets. No middle ear effusions. Sensitivity to left ear when palpating TMJ  Type C tympanogram on the right, type A tympanogram on the left  Audiogram with mild bilateral high-frequency sensorineural hearing loss that is slightly worse on the right side of the low frequencies  Neck: Soft, supple, no palpable lateral neck masses. No parotid or submandibular masses. No thyromegaly or palpable thyroid nodules. No surgical scars. Lymphatics: No palpable cervical LAD. Resp/Lungs: No audible stridor or wheezing, CTAB  Heart: RRR  Extremities: No clubbing or cyanosis. PROCEDURE: Cerumen removal under binocular microscopy  INDICATIONS: Cerumen impaction  DESCRIPTION: After verbal consent was obtained and a timeout was performed, the otologic microscope was used to visualize both ears. There were normal appearing auricles bilaterally. There was cerumen impaction on the right. I cleaned out the right ear under the scope w/ a right angle hook and otologic suctions. After cleaning, the ear canal skin was healthy and both TMs were intact w/ no perforations. Both middle ear spaces were clear w/ no effusions. The patient tolerated the procedure well and there were no complications.     Lab Results   Component Value Date    WBC 6.8 08/08/2022    HGB 13.6 08/08/2022    HCT 44.1 08/08/2022    .9 (H) 08/08/2022     08/08/2022     Lab Results   Component Value Date/Time     08/08/2022 01:43 PM    K 4.5 08/08/2022 01:43 PM     08/08/2022 01:43 PM    CO2 28 08/08/2022 01:43 PM    BUN 13 08/08/2022 01:43 PM    CREATININE 1.00 08/08/2022 01:43 PM    GLUCOSE 93 08/08/2022 01:43 PM    CALCIUM 9.2 08/08/2022 01:43 PM        ASSESSMENT and PLAN  49-year-old female with bilateral sensorineural hearing loss, slightly worse on the right side in the low frequencies, type C tympanogram on the right, type A on the left. I will give her Rhinocort and Singulair to use in addition to her Zyrtec. I recommended she use auto insufflation when her ears feel full. I gave her tinnitus treatment strategies. I will see her back in 1 year with repeat audiogram.    ICD-10-CM    1. Allergic rhinitis, unspecified seasonality, unspecified trigger  J30.9       2. Laryngopharyngeal reflux (LPR)  K21.9       3. Tinnitus of both ears  H93.13       4. Excessive ear wax, right  H61.21 REMOVAL OF IMPACTED WAX MD      5. ETD (Eustachian tube dysfunction), right  H69.81       6. Sensorineural hearing loss (SNHL) of both ears  H90.3             Frank Alvarez MD  12/6/2022  Electronically signed    Note dictated using voice recognition software. Please excuse any typos.

## 2022-12-06 NOTE — PROGRESS NOTES
AUDIOLOGY EVALUATION    Albina Tobin had Tympanometry and Audiometry performed today. The patient reports tinnitus. Results as follows:    Tympanometry    Type A -  on left  Type C -  on right    Audiometry    Test Performed - Comprehensive Audiogram    Type of Loss - Right Ear: abnormal hearing: degree of loss is normal to mild sensorineural hearing loss                           Left Ear: abnormal hearing: degree of loss is normal to mild sensorineural hearing loss     SRT   Measurement Right Ear Left Ear   Value 20 15   Unit dB dB     Discrimination  Measurement Right Ear Left Ear   Value 96% 92%   Unit dB dB     Recommend  Further testing due to asymmetry    A.  [address]  Audiologist

## 2022-12-16 ENCOUNTER — TELEMEDICINE (OUTPATIENT)
Dept: INTERNAL MEDICINE CLINIC | Facility: CLINIC | Age: 52
End: 2022-12-16
Payer: MEDICAID

## 2022-12-16 DIAGNOSIS — J00 ACUTE NASOPHARYNGITIS: ICD-10-CM

## 2022-12-16 DIAGNOSIS — R50.9 FEVER, UNSPECIFIED FEVER CAUSE: Primary | ICD-10-CM

## 2022-12-16 DIAGNOSIS — J02.9 SORE THROAT: ICD-10-CM

## 2022-12-16 LAB
EXP DATE SOLUTION: NORMAL
EXP DATE SWAB: NORMAL
EXPIRATION DATE: NORMAL
GROUP A STREP ANTIGEN, POC: NEGATIVE
INFLUENZA A ANTIGEN, POC: NEGATIVE
INFLUENZA B ANTIGEN, POC: NEGATIVE
LOT NUMBER POC: NORMAL
LOT NUMBER SOLUTION: NORMAL
LOT NUMBER SWAB: NORMAL
SARS-COV-2 RNA, POC: NEGATIVE
VALID INTERNAL CONTROL, POC: YES
VALID INTERNAL CONTROL, POC: YES

## 2022-12-16 PROCEDURE — 87635 SARS-COV-2 COVID-19 AMP PRB: CPT | Performed by: INTERNAL MEDICINE

## 2022-12-16 PROCEDURE — 87804 INFLUENZA ASSAY W/OPTIC: CPT | Performed by: INTERNAL MEDICINE

## 2022-12-16 PROCEDURE — 99213 OFFICE O/P EST LOW 20 MIN: CPT | Performed by: INTERNAL MEDICINE

## 2022-12-16 PROCEDURE — 87880 STREP A ASSAY W/OPTIC: CPT | Performed by: INTERNAL MEDICINE

## 2022-12-16 RX ORDER — AZITHROMYCIN 250 MG/1
250 TABLET, FILM COATED ORAL SEE ADMIN INSTRUCTIONS
Qty: 6 TABLET | Refills: 0 | Status: SHIPPED | OUTPATIENT
Start: 2022-12-16 | End: 2022-12-21

## 2022-12-16 ASSESSMENT — ENCOUNTER SYMPTOMS
COUGH: 1
SORE THROAT: 1

## 2022-12-16 NOTE — PROGRESS NOTES
[facility]      2022    Patient Name: Hussain Tobin  :  1970    Subjective:    Chief Complaint:  Chief Complaint   Patient presents with    URI         HPI I was at home while conducting this encounter. Consent:  She and/or her healthcare decision maker is aware that this patient-initiated Telehealth encounter is a billable service, with coverage as determined by her insurance carrier. She is aware that she may receive a bill and has provided verbal consent to proceed: Yes    This virtual visit was conducted via [employer]. Pursuant to the emergency declaration under the [facility], 1135 waiver authority and the [employer] Act, this Virtual  Visit was conducted to reduce the patient's risk of exposure to COVID-19 and provide continuity of care for an established patient. Services were provided through a video synchronous discussion virtually to substitute for in-person clinic visit. Due to this being a TeleHealth evaluation, many elements of the physical examination are unable to be assessed. Total Time: minutes: 5-10 minutes.        C/o sinus congestion, burning sensation in her ears x2 days; c/o runny nose, headache; she's had fever, chills, sore throat; she works in customer service; she has not done a Covid 19 test at home;     Past Medical History:   Diagnosis Date    Anxiety     Arthritis     Bilateral carpal tunnel syndrome 2020    Chronic obstructive pulmonary disease (HCC)     Chronic pain     Contact dermatitis and other eczema, due to unspecified cause     Diabetes ([address]     Fibroids     Hypercholesterolemia     Hypertension     Trauma        Past Surgical History:   Procedure Laterality Date    CARPAL TUNNEL RELEASE Bilateral 2021     SECTION      x2    COLONOSCOPY N/A 9/3/2019    COLONOSCOPY /BMI 28 performed by Jessie Stovall MD at [facility] ENDOSCOPY FINGER TRIGGER RELEASE Right     MYOMECTOMY      ORTHOPEDIC SURGERY Left     Broke ankle has screws in place    OTHER SURGICAL HISTORY      Cervical spine C-6-C&7       Family History   Problem Relation Age of Onset    Other Father     Diabetes Father     Hypertension Father     Hypertension Mother     Cancer Paternal Grandmother     Osteoarthritis Mother     Heart Attack Paternal Grandmother     Hypertension Brother     Hypertension Sister     Other Sister         fibroids    Thyroid Disease Sister     Diabetes Paternal Grandmother         colon       Social History     Tobacco Use    Smoking status: Former    Smokeless tobacco: Never   Vaping Use    Vaping Use: Never used   Substance Use Topics    Alcohol use: Yes    Drug use: No                 Current Outpatient Medications:     budesonide (RINOCORT AQUA) 32 MCG/ACT nasal spray, 2 sprays by Each Nostril route 2 times daily, Disp: 2 each, Rfl: 5    montelukast (SINGULAIR) 10 MG tablet, Take 1 tablet by mouth daily, Disp: 60 tablet, Rfl: 5    pregabalin (LYRICA) 100 MG capsule, Take 1 capsule by mouth 3 times daily for 180 days. (Patient not taking: Reported on 12/6/2022), Disp: 90 capsule, Rfl: 5    Multiple Vitamins-Minerals (MULTIVITAMIN ADULT EXTRA C PO), Take 1 tablet by mouth, Disp: , Rfl:     erythromycin (ROMYCIN) 5 MG/GM ophthalmic ointment, Apply thin layer to lower right eyelid BID for 7 days. , Disp: 1 each, Rfl: 1    blood glucose test strips (ASCENSIA AUTODISC VI;ONE TOUCH ULTRA TEST VI) strip, 1 each by In Vitro route daily As needed. , Disp: 100 each, Rfl: 3    levothyroxine (SYNTHROID) 25 MCG tablet, Take 1 tablet by mouth every morning (before breakfast), Disp: 90 tablet, Rfl: 0    metFORMIN (GLUCOPHAGE) 500 MG tablet, Take 1 tablet by mouth 2 times daily (with meals) TAKE 1 TABLET BY MOUTH TWICE A DAY, Disp: 180 tablet, Rfl: 0    atenolol (TENORMIN) 25 MG tablet, Take 1 tablet by mouth daily, Disp: 90 tablet, Rfl: 0    HYDROcodone-acetaminophen (NORCO) 5-325 MG per tablet, hydrocodone 5 mg-acetaminophen 325 mg tablet, Disp: , Rfl:     metroNIDAZOLE (METROGEL) 0.75 % vaginal gel, Use nightly in the vagina for 5 nights, Disp: 70 g, Rfl: 5    Lancets MISC, Check fasting BS daily. Dx: T2DM, Disp: , Rfl:     ascorbic acid (VITAMIN C) 500 MG tablet, Take by mouth as needed , Disp: , Rfl:     aspirin 81 MG EC tablet, Take by mouth daily prn, Disp: , Rfl:     cetirizine (ZYRTEC) 10 MG tablet, TAKE 1 TABLET BY MOUTH EVERY DAY AT NIGHT, Disp: , Rfl:     Ergocalciferol 50 MCG (2000 UT) TABS, Take 1 capsule by mouth daily, Disp: , Rfl:     Estradiol (VAGIFEM) 10 MCG TABS vaginal tablet, Place 10 mcg vaginally Twice a Week, Disp: , Rfl:     fluconazole (DIFLUCAN) 150 MG tablet, 1 tablet now and repeat dose in 4 days, Disp: , Rfl:     meloxicam (MOBIC) 7.5 MG tablet, Take 7.5 mg by mouth daily, Disp: , Rfl:     methocarbamol (ROBAXIN) 500 MG tablet, Take 500 mg by mouth 3 times daily, Disp: , Rfl:     oxybutynin (DITROPAN) 5 MG tablet, Take 5 mg by mouth 3 times daily (Patient not taking: Reported on 12/6/2022), Disp: , Rfl:     pantoprazole (PROTONIX) 40 MG tablet, Take 40 mg by mouth daily, Disp: , Rfl:     simvastatin (ZOCOR) 20 MG tablet, Take 20 mg by mouth, Disp: , Rfl:     Allergies   Allergen Reactions    Latex Other (See Comments)     Other reaction(s): Other (see comments)      Other Swelling     Insect    Macadamia Nut Oil Hives     [place] Nuts    Penicillin G Nausea Only    Lyrica [Pregabalin] Palpitations       Review of Systems   Constitutional:  Positive for chills and fever. HENT:  Positive for congestion, postnasal drip and sore throat. Respiratory:  Positive for cough. Cardiovascular: Negative. Objective: There were no vitals taken for this visit. Examination:  Physical Exam  Neurological:      Mental Status: She is alert. Psychiatric:         Mood and Affect: Mood normal.         Thought Content:  Thought content normal. Judgment: Judgment normal.         Assessment/Plan:    John Almeida was seen today for uri. Diagnoses and all orders for this visit:    Fever, unspecified fever cause  -     AMB POC RAPID INFLUENZA TEST  -     AMB POC RAPID STREP A  -     AMB POC COVID-19 COV    Sore throat  -     AMB POC RAPID INFLUENZA TEST  -     AMB POC RAPID STREP A  -     AMB POC COVID-19 COV        Follow-up and Dispositions    Return if symptoms worsen or fail to improve, for lab only in our office today for covid, flu, strep test; . Medication Reconciliation:  Current Medications Verified: Current medications/ immunizations were reviewed, including purpose, with patient. Family History, Social History, Current and Past Medical History was reviewed with patient and updated at today's office visit. Medication Reconciliation list was given to patient/ family. Patient was advised to discard old medication lists and provide all providers with current list at each visit and carry list with them in case of emergency.     Completed By:   Sunil Perez MD    [facility]  [address] [place] [address] 2050, [address], [address]  [phone]  [phone] fax  1:32 PM

## 2022-12-16 NOTE — RESULT ENCOUNTER NOTE
She was negative for Covid 19, flu and strep, WNL; I will send rx to pharmacy for zpack, is to call if no improvement; drink plenty of fluids and get plenty of rest;

## 2022-12-21 NOTE — PROGRESS NOTES
WALTER Bueno is a 45 y.o. female seen for pelvic pain and vaginal irritation. She does have a known history of fibroids. Past Medical History, Past Surgical History, Family history, Social History, and Medications were all reviewed with the patient today and updated as necessary. Current Outpatient Medications   Medication Sig    terconazole (TERAZOL 7) 0.4 % vaginal cream Place vaginally nightly. fluconazole (DIFLUCAN) 150 MG tablet Take 1 tablet now and repeat dose in 4 days    montelukast (SINGULAIR) 10 MG tablet Take 1 tablet by mouth daily    pregabalin (LYRICA) 100 MG capsule Take 1 capsule by mouth 3 times daily for 180 days. Multiple Vitamins-Minerals (MULTIVITAMIN ADULT EXTRA C PO) Take 1 tablet by mouth    blood glucose test strips (ASCENSIA AUTODISC VI;ONE TOUCH ULTRA TEST VI) strip 1 each by In Vitro route daily As needed. levothyroxine (SYNTHROID) 25 MCG tablet Take 1 tablet by mouth every morning (before breakfast)    metFORMIN (GLUCOPHAGE) 500 MG tablet Take 1 tablet by mouth 2 times daily (with meals) TAKE 1 TABLET BY MOUTH TWICE A DAY    atenolol (TENORMIN) 25 MG tablet Take 1 tablet by mouth daily    HYDROcodone-acetaminophen (NORCO) 5-325 MG per tablet hydrocodone 5 mg-acetaminophen 325 mg tablet    metroNIDAZOLE (METROGEL) 0.75 % vaginal gel Use nightly in the vagina for 5 nights    Lancets MISC Check fasting BS daily.  Dx: T2DM    ascorbic acid (VITAMIN C) 500 MG tablet Take by mouth as needed     aspirin 81 MG EC tablet Take by mouth daily prn    cetirizine (ZYRTEC) 10 MG tablet TAKE 1 TABLET BY MOUTH EVERY DAY AT NIGHT    Ergocalciferol 50 MCG (2000 UT) TABS Take 1 capsule by mouth daily    Estradiol (VAGIFEM) 10 MCG TABS vaginal tablet Place 10 mcg vaginally Twice a Week    fluconazole (DIFLUCAN) 150 MG tablet 1 tablet now and repeat dose in 4 days    meloxicam (MOBIC) 7.5 MG tablet Take 7.5 mg by mouth daily    oxybutynin (DITROPAN) 5 MG tablet Take 5 mg by mouth 3 times daily    pantoprazole (PROTONIX) 40 MG tablet Take 40 mg by mouth daily    simvastatin (ZOCOR) 20 MG tablet Take 20 mg by mouth     No current facility-administered medications for this visit. Allergies   Allergen Reactions    Latex Other (See Comments)     Other reaction(s): Other (see comments)      Other Swelling     Insect    Macadamia Nut Oil Hives     [place] Nuts    Penicillin G Nausea Only    Lyrica [Pregabalin] Palpitations     Past Medical History:   Diagnosis Date    Anxiety     Arthritis     Bilateral carpal tunnel syndrome 2020    Chronic obstructive pulmonary disease (HCC)     Chronic pain     Contact dermatitis and other eczema, due to unspecified cause     Diabetes ([facility] [address]     Fibroids     Hypercholesterolemia     Hypertension     Trauma      Past Surgical History:   Procedure Laterality Date    CARPAL TUNNEL RELEASE Bilateral 2021     SECTION      x2    COLONOSCOPY N/A 9/3/2019    COLONOSCOPY /BMI 28 performed by Regina Turpin MD at [facility] 5 Right     MYOMECTOMY      ORTHOPEDIC SURGERY Left     Broke ankle has screws in place    OTHER SURGICAL HISTORY      Cervical spine C-6-C&7     Family History   Problem Relation Age of Onset    Other Father     Diabetes Father     Hypertension Father     Hypertension Mother     Cancer Paternal Grandmother     Osteoarthritis Mother     Heart Attack Paternal Grandmother     Hypertension Brother     Hypertension Sister     Other Sister         fibroids    Thyroid Disease Sister     Diabetes Paternal Grandmother         colon      Social History     Tobacco Use    Smoking status: Former    Smokeless tobacco: Never   Substance Use Topics    Alcohol use: Yes       Social History     Substance and Sexual Activity   Sexual Activity Not Currently     OB History   No obstetric history on file.        Health Maintenance  Mammogram: 10/12/22  Colonoscopy: 2019  Bone Density:Neg    ROS:    Review of Systems  General: Not Present- Chills, Fever, Fatigue, Insomnia, Hot flashes/Night sweats, Weight gain  Skin: Not Present- Bruising, Change in Wart/Mole, Excessive Sweating, Itching, Nail Changes, New Lesions, Rash, Skin Color Changes and Ulcer. HEENT: Not Present- Headache, Blurred Vision, Double Vision, Glaucoma, Visual Disturbances, Hearing Loss, Ringing in the Ears, Vertigo, Nose Bleed, Bleeding Gums, Hoarseness and Sore Throat. Neck: Not Present- Neck Pain and Neck Swelling. Respiratory: Not Present- Cough, Difficulty Breathing and Difficulty Breathing on Exertion. Breast: Not Present- Breast Mass, Breast Pain, Breast Swelling, Nipple Discharge, Nipple Pain, Recent Breast Size Changes and Skin Changes. Cardiovascular: Not Present- Abnormal Blood Pressure, Chest Pain, Edema, Fainting / Blacking Out, Palpitations, Shortness of Breath and Swelling of Extremities. Gastrointestinal: Not Present- Abdominal Pain, Abdominal Swelling, Bloating, Change in Bowel Habits, Constipation, Diarrhea, Difficulty Swallowing, Gets full quickly at meals, Nausea, Rectal Bleeding and Vomiting. Female Genitourinary: Not Present- Dysmenorrhea, Dyspareunia, Decreased libido, Excessive Menstrual Bleeding, Menstrual Irregularities, Pelvic Pain, Urinary Complaints, Vaginal Discharge, Vaginal itching/burning, Vaginal odor  Musculoskeletal: Not Present- Joint Pain and Muscle Pain. Neurological: Not Present- Dizziness, Fainting, Headaches and Seizures. Psychiatric: Not Present- Anxiety, Depression, Mood changes and Panic Attacks. Endocrine: Not Present- Appetite Changes, Cold Intolerance, Excessive Thirst, Excessive Urination and Heat Intolerance. Hematology: Not Present- Abnormal Bleeding, Easy Bruising and Enlarged Lymph Nodes. PHYSICAL EXAM:    /78 (Position: Sitting)   Ht 5' 6\" (1.676 m)   Wt 187 lb (84.8 kg)   BMI 30.18 kg/m²     Physical Exam   General   Mental Status - Alert. General Appearance - Cooperative. Abdomen   Inspection: - Inspection Normal.   Palpation/Percussion: Palpation and Percussion of the abdomen reveal - Non Tender, No Rebound tenderness, No Rigidity (guarding), No hepatosplenomegaly, No Palpable abdominal masses and Soft. Auscultation: Auscultation of the abdomen reveals - Bowel sounds normal.     Female Genitourinary     External Genitalia   Vulva: - Normal. Perineum - Normal. Bartholin's Gland - Bilateral - Normal. Clitoris - Normal.   Introitus: Characteristics - Normal.   Urethra: Characteristics - Normal.     Speculum & Bimanual   Vagina: Vaginal Mucosa - Normal.   Vaginal Wall: - Normal.   Vaginal Lesions - None. Cervix: Characteristics - Normal.   Uterus: Characteristics - Normal.   Adnexa: - Normal.   Bladder - Normal.     Lymphatics  No cervical, axillary or groin adenopathy          Medical problems and test results were reviewed with the patient today. ASSESSMENT and PLAN    1. Vaginal discharge  -     Smear, Wet [place], Saline ([id number])  -     Nuswab Vaginitis Plus (VG+)  2. Pelvic pain  -     US NON OB TRANSVAGINAL  3. Yeast infection  -     terconazole (TERAZOL 7) 0.4 % vaginal cream; Place vaginally nightly., Disp-45 g, R-1Normal  -     fluconazole (DIFLUCAN) 150 MG tablet; Take 1 tablet now and repeat dose in 4 days, Disp-2 tablet, R-1Normal       Comment   [id number]-----fesdbe pain   HISTORY: History of myomectomy around 2003. Had biopsy done 9/2020 that was benign. No bleeding issues   just sharp pain every now and then. Vaginal irration. COMPARISON: Ultrasound 9/28/20----multiple fibroid with the largest = 5cm and endo = 5mm.   ---------------------------------------------------------------------------------------------------------------   Enlarged uterus = 11wks with multiple fibroids throughout the uterus. The largest measures 3.9/3.7/4.0cm and   positioned midline. Multiple smaller fibroids noted.    Endometrium = 2.4mm and appears normal   Rt ovary is normal.   Lt ovary is normal.   No free fluid noted in the pelvis. Gyn Report [facility] Page [facility]   Name: Caleb Mcgowan Patient ID: [id number]   Date: 12/22/2022 Perf. Physician: Dr. Marlene Gonzalez MD Sonographer: Musa merrill      I reviewed the [place] done in my office and discussed with patient. Wet prep done and reviewed by alex pierre.        Time:  I spent  30 minutes in preparing to see patient (including chart review and preparation), obtaining and/or reviewing additional medical history, performing a physical exam and evaluation, documenting clinical information in the electronic health record, independently interpreting results, communicating results to patient, family or caregiver, and/or coordinating care. No follow-ups on file.        Juan Link MD

## 2022-12-22 ENCOUNTER — OFFICE VISIT (OUTPATIENT)
Dept: GYNECOLOGY | Age: 52
End: 2022-12-22
Payer: MEDICAID

## 2022-12-22 VITALS
DIASTOLIC BLOOD PRESSURE: 78 MMHG | SYSTOLIC BLOOD PRESSURE: 130 MMHG | HEIGHT: 66 IN | WEIGHT: 187 LBS | BODY MASS INDEX: 30.05 KG/M2

## 2022-12-22 DIAGNOSIS — B37.9 YEAST INFECTION: ICD-10-CM

## 2022-12-22 DIAGNOSIS — R10.2 PELVIC PAIN: ICD-10-CM

## 2022-12-22 DIAGNOSIS — N89.8 VAGINAL DISCHARGE: Primary | ICD-10-CM

## 2022-12-22 LAB — WET PREP (POC): ABNORMAL

## 2022-12-22 PROCEDURE — 3074F SYST BP LT 130 MM HG: CPT | Performed by: OBSTETRICS & GYNECOLOGY

## 2022-12-22 PROCEDURE — 99214 OFFICE O/P EST MOD 30 MIN: CPT | Performed by: OBSTETRICS & GYNECOLOGY

## 2022-12-22 PROCEDURE — 87210 SMEAR WET MOUNT SALINE/INK: CPT | Performed by: OBSTETRICS & GYNECOLOGY

## 2022-12-22 PROCEDURE — 76830 TRANSVAGINAL US NON-OB: CPT | Performed by: OBSTETRICS & GYNECOLOGY

## 2022-12-22 RX ORDER — FLUCONAZOLE 150 MG/1
TABLET ORAL
Qty: 2 TABLET | Refills: 1 | Status: SHIPPED | OUTPATIENT
Start: 2022-12-22

## 2022-12-25 LAB
A VAGINAE DNA VAG QL NAA+PROBE: NORMAL SCORE
BVAB2 DNA VAG QL NAA+PROBE: NORMAL SCORE
C ALBICANS DNA VAG QL NAA+PROBE: NEGATIVE
C GLABRATA DNA VAG QL NAA+PROBE: NEGATIVE
MEGA1 DNA VAG QL NAA+PROBE: NORMAL SCORE
SPECIMEN SOURCE: NORMAL

## 2023-01-30 ENCOUNTER — TELEPHONE (OUTPATIENT)
Dept: NEUROLOGY | Age: 53
End: 2023-01-30

## 2023-01-30 ENCOUNTER — TELEPHONE (OUTPATIENT)
Dept: INTERNAL MEDICINE CLINIC | Facility: CLINIC | Age: 53
End: 2023-01-30

## 2023-01-30 DIAGNOSIS — M79.2 NEUROPATHIC PAIN: ICD-10-CM

## 2023-01-30 DIAGNOSIS — G62.9 PERIPHERAL POLYNEUROPATHY: Primary | ICD-10-CM

## 2023-01-30 RX ORDER — PREGABALIN 75 MG/1
75 CAPSULE ORAL 3 TIMES DAILY
Qty: 90 CAPSULE | Refills: 5 | Status: SHIPPED | OUTPATIENT
Start: 2023-01-30 | End: 2023-07-29

## 2023-01-30 NOTE — TELEPHONE ENCOUNTER
----- Message from Mitzi Moeller sent at 1/30/2023 10:15 AM EST -----  Subject: Message to Provider    QUESTIONS  Information for Provider? pt is looking to get a lab appointement  ---------------------------------------------------------------------------  --------------  [address] [facility]  [phone]; OK to leave message on voicemail  ---------------------------------------------------------------------------  --------------  SCRIPT ANSWERS  Relationship to Patient?  Self

## 2023-02-03 DIAGNOSIS — I10 ESSENTIAL HYPERTENSION: ICD-10-CM

## 2023-02-03 DIAGNOSIS — E07.9 THYROID DISEASE: ICD-10-CM

## 2023-02-03 DIAGNOSIS — E11.40 CONTROLLED TYPE 2 DIABETES WITH NEUROPATHY (HCC): ICD-10-CM

## 2023-02-03 LAB
APPEARANCE UR: ABNORMAL
BACTERIA URNS QL MICRO: ABNORMAL /HPF
BASOPHILS # BLD: 0.1 K/UL (ref 0–0.2)
BASOPHILS NFR BLD: 1 % (ref 0–2)
BILIRUB UR QL: NEGATIVE
COLOR UR: ABNORMAL
DIFFERENTIAL METHOD BLD: NORMAL
EOSINOPHIL # BLD: 0.4 K/UL (ref 0–0.8)
EOSINOPHIL NFR BLD: 6 % (ref 0.5–7.8)
EPI CELLS #/AREA URNS HPF: ABNORMAL /HPF
ERYTHROCYTE [DISTWIDTH] IN BLOOD BY AUTOMATED COUNT: 13.5 % (ref 11.9–14.6)
EST. AVERAGE GLUCOSE BLD GHB EST-MCNC: 137 MG/DL
GLUCOSE UR STRIP.AUTO-MCNC: NEGATIVE MG/DL
HBA1C MFR BLD: 6.4 % (ref 4.8–5.6)
HCT VFR BLD AUTO: 42.3 % (ref 35.8–46.3)
HGB BLD-MCNC: 13.7 G/DL (ref 11.7–15.4)
HGB UR QL STRIP: NEGATIVE
IMM GRANULOCYTES # BLD AUTO: 0 K/UL (ref 0–0.5)
IMM GRANULOCYTES NFR BLD AUTO: 0 % (ref 0–5)
KETONES UR QL STRIP.AUTO: ABNORMAL MG/DL
LEUKOCYTE ESTERASE UR QL STRIP.AUTO: NEGATIVE
LYMPHOCYTES # BLD: 2.8 K/UL (ref 0.5–4.6)
LYMPHOCYTES NFR BLD: 37 % (ref 13–44)
MCH RBC QN AUTO: 31.3 PG (ref 26.1–32.9)
MCHC RBC AUTO-ENTMCNC: 32.4 G/DL (ref 31.4–35)
MCV RBC AUTO: 96.6 FL (ref 82–102)
MONOCYTES # BLD: 0.4 K/UL (ref 0.1–1.3)
MONOCYTES NFR BLD: 6 % (ref 4–12)
NEUTS SEG # BLD: 3.7 K/UL (ref 1.7–8.2)
NEUTS SEG NFR BLD: 50 % (ref 43–78)
NITRITE UR QL STRIP.AUTO: NEGATIVE
NRBC # BLD: 0 K/UL (ref 0–0.2)
PH UR STRIP: 5.5 (ref 5–9)
PLATELET # BLD AUTO: 315 K/UL (ref 150–450)
PMV BLD AUTO: 11 FL (ref 9.4–12.3)
PROT UR STRIP-MCNC: ABNORMAL MG/DL
RBC # BLD AUTO: 4.38 M/UL (ref 4.05–5.2)
RBC #/AREA URNS HPF: ABNORMAL /HPF
SP GR UR REFRACTOMETRY: 1.03 (ref 1–1.02)
UROBILINOGEN UR QL STRIP.AUTO: 0.2 EU/DL (ref 0.2–1)
WBC # BLD AUTO: 7.4 K/UL (ref 4.3–11.1)
WBC URNS QL MICRO: ABNORMAL /HPF

## 2023-02-04 LAB
ALBUMIN SERPL-MCNC: 4.4 G/DL (ref 3.5–5)
ALBUMIN/GLOB SERPL: 1.3 (ref 0.4–1.6)
ALP SERPL-CCNC: 55 U/L (ref 50–136)
ALT SERPL-CCNC: 21 U/L (ref 12–65)
ANION GAP SERPL CALC-SCNC: 6 MMOL/L (ref 2–11)
AST SERPL-CCNC: 12 U/L (ref 15–37)
BILIRUB SERPL-MCNC: 0.6 MG/DL (ref 0.2–1.1)
BUN SERPL-MCNC: 15 MG/DL (ref 6–23)
CALCIUM SERPL-MCNC: 9.2 MG/DL (ref 8.3–10.4)
CHLORIDE SERPL-SCNC: 107 MMOL/L (ref 101–110)
CHOLEST SERPL-MCNC: 193 MG/DL
CO2 SERPL-SCNC: 30 MMOL/L (ref 21–32)
CREAT SERPL-MCNC: 1 MG/DL (ref 0.6–1)
GLOBULIN SER CALC-MCNC: 3.4 G/DL (ref 2.8–4.5)
GLUCOSE SERPL-MCNC: 115 MG/DL (ref 65–100)
HDLC SERPL-MCNC: 57 MG/DL (ref 40–60)
HDLC SERPL: 3.4
LDLC SERPL CALC-MCNC: 106 MG/DL
POTASSIUM SERPL-SCNC: 3.9 MMOL/L (ref 3.5–5.1)
PROT SERPL-MCNC: 7.8 G/DL (ref 6.3–8.2)
SODIUM SERPL-SCNC: 143 MMOL/L (ref 133–143)
TRIGL SERPL-MCNC: 150 MG/DL (ref 35–150)
TSH, 3RD GENERATION: 1.03 UIU/ML (ref 0.36–3.74)
VLDLC SERPL CALC-MCNC: 30 MG/DL (ref 6–23)

## 2023-02-08 ENCOUNTER — TELEPHONE (OUTPATIENT)
Dept: NEUROLOGY | Age: 53
End: 2023-02-08

## 2023-02-14 ENCOUNTER — OFFICE VISIT (OUTPATIENT)
Dept: INTERNAL MEDICINE CLINIC | Facility: CLINIC | Age: 53
End: 2023-02-14
Payer: MEDICAID

## 2023-02-14 VITALS
RESPIRATION RATE: 16 BRPM | OXYGEN SATURATION: 99 % | SYSTOLIC BLOOD PRESSURE: 138 MMHG | WEIGHT: 193.8 LBS | TEMPERATURE: 98 F | DIASTOLIC BLOOD PRESSURE: 68 MMHG | BODY MASS INDEX: 31.15 KG/M2 | HEART RATE: 71 BPM | HEIGHT: 66 IN

## 2023-02-14 DIAGNOSIS — D22.30 CHANGE IN FACIAL MOLE: ICD-10-CM

## 2023-02-14 DIAGNOSIS — R21 RASH AND NONSPECIFIC SKIN ERUPTION: ICD-10-CM

## 2023-02-14 DIAGNOSIS — E11.65 TYPE 2 DIABETES MELLITUS WITH HYPERGLYCEMIA, WITHOUT LONG-TERM CURRENT USE OF INSULIN (HCC): ICD-10-CM

## 2023-02-14 DIAGNOSIS — E07.9 THYROID DISEASE: ICD-10-CM

## 2023-02-14 DIAGNOSIS — Z11.4 ENCOUNTER FOR SCREENING FOR HIV: ICD-10-CM

## 2023-02-14 DIAGNOSIS — Z11.59 ENCOUNTER FOR HEPATITIS C SCREENING TEST FOR LOW RISK PATIENT: ICD-10-CM

## 2023-02-14 DIAGNOSIS — Z00.00 ENCOUNTER FOR ANNUAL HEALTH EXAMINATION: Primary | ICD-10-CM

## 2023-02-14 DIAGNOSIS — I10 ESSENTIAL HYPERTENSION: ICD-10-CM

## 2023-02-14 DIAGNOSIS — E78.2 MIXED HYPERLIPIDEMIA: ICD-10-CM

## 2023-02-14 PROCEDURE — 3075F SYST BP GE 130 - 139MM HG: CPT | Performed by: INTERNAL MEDICINE

## 2023-02-14 PROCEDURE — 3078F DIAST BP <80 MM HG: CPT | Performed by: INTERNAL MEDICINE

## 2023-02-14 PROCEDURE — 99396 PREV VISIT EST AGE 40-64: CPT | Performed by: INTERNAL MEDICINE

## 2023-02-14 RX ORDER — TERBINAFINE HYDROCHLORIDE 250 MG/1
TABLET ORAL
COMMUNITY
Start: 2023-01-30

## 2023-02-14 RX ORDER — CLINDAMYCIN PHOSPHATE 11.9 MG/ML
SOLUTION TOPICAL
Qty: 60 ML | Refills: 1 | Status: SHIPPED | OUTPATIENT
Start: 2023-02-14 | End: 2023-03-16

## 2023-02-14 RX ORDER — MELOXICAM 15 MG/1
TABLET ORAL
COMMUNITY
Start: 2023-01-30

## 2023-02-14 SDOH — ECONOMIC STABILITY: FOOD INSECURITY: WITHIN THE PAST 12 MONTHS, YOU WORRIED THAT YOUR FOOD WOULD RUN OUT BEFORE YOU GOT MONEY TO BUY MORE.: OFTEN TRUE

## 2023-02-14 SDOH — ECONOMIC STABILITY: FOOD INSECURITY: WITHIN THE PAST 12 MONTHS, THE FOOD YOU BOUGHT JUST DIDN'T LAST AND YOU DIDN'T HAVE MONEY TO GET MORE.: SOMETIMES TRUE

## 2023-02-14 SDOH — ECONOMIC STABILITY: INCOME INSECURITY: HOW HARD IS IT FOR YOU TO PAY FOR THE VERY BASICS LIKE FOOD, HOUSING, MEDICAL CARE, AND HEATING?: NOT HARD AT ALL

## 2023-02-14 SDOH — ECONOMIC STABILITY: HOUSING INSECURITY
IN THE LAST 12 MONTHS, WAS THERE A TIME WHEN YOU DID NOT HAVE A STEADY PLACE TO SLEEP OR SLEPT IN A SHELTER (INCLUDING NOW)?: NO

## 2023-02-14 ASSESSMENT — ENCOUNTER SYMPTOMS
DIARRHEA: 0
SINUS PAIN: 0
SINUS PRESSURE: 0
BLOOD IN STOOL: 0
GASTROINTESTINAL NEGATIVE: 1
NAUSEA: 0
VOMITING: 0
ALLERGIC/IMMUNOLOGIC NEGATIVE: 1
SORE THROAT: 0
COUGH: 0
RHINORRHEA: 0
SHORTNESS OF BREATH: 0
TROUBLE SWALLOWING: 0
CHEST TIGHTNESS: 0
RESPIRATORY NEGATIVE: 1
EYES NEGATIVE: 1
ABDOMINAL PAIN: 0
FACIAL SWELLING: 0
VOICE CHANGE: 0

## 2023-02-14 ASSESSMENT — PATIENT HEALTH QUESTIONNAIRE - PHQ9
2. FEELING DOWN, DEPRESSED OR HOPELESS: 0
SUM OF ALL RESPONSES TO PHQ QUESTIONS 1-9: 0
SUM OF ALL RESPONSES TO PHQ9 QUESTIONS 1 & 2: 0
SUM OF ALL RESPONSES TO PHQ QUESTIONS 1-9: 0
1. LITTLE INTEREST OR PLEASURE IN DOING THINGS: 0
SUM OF ALL RESPONSES TO PHQ QUESTIONS 1-9: 0
SUM OF ALL RESPONSES TO PHQ QUESTIONS 1-9: 0

## 2023-02-14 NOTE — PATIENT INSTRUCTIONS
FOOD RESOURCES    Meals on Wheels:  What they offer: Meals on Wheels is a program that delivers meals to individuals who have no reliable means for maintaining a healthy diet. [address] [place] [address] Phone: [phone]  www. [facility]. [address]:  What they offer:  Anyone is eligible to order, but Jose Ariza is specifically designed for customers who could benefit from accessible, low-cost fresh food. Fresh Food Boxes are $15* with credit/debit card and are ALWAYS $5 with SNAP/EBT   Boxes are distributed every other week and you must preorder your box through their website  Drive-thru box pickup is every other Wednesday from 11 am-6 pm at: [address] [facility]) [facility], [address] [place] [address], [address] [facility]  Website:  www.[email or web address]     Food Pantries:   Darinel Hartman   Phone: [phone]  Located in [place] [address], [facility].  Open Thursdays 8a-12p  Website: www.[employer]: 811.878.3425  Located at [address] 8am-12pm Fridays  Website: https://[employer] [facility]. org/   [address] Pantry  Phone: [phone]  Located at [address]  Call for Pantry hours and availability  Website: [employer].  [place] of [address] [place] [address] Pantry  Phone: [phone]  Located at [phone]  Call for Pantry hours and availability    Website: [employer]. php  [address]  Phone: [phone]  Located at [facility]. Emergency Food Pantry Hours: Mon, Wed, and Fri 9am-1pm  Website: http://www.[employer]/  [address] Pantry  Phone: [phone]  Located at [address] [facility].   Call for hours and availability   Website: https://AwarenessHub/  [address] Pantry  Phone: [phone]  Located at [address] [place] [address] Gerry.  Call for hours and availability; serves up to 76 families a week, based on donations  Website: https://[employer]. [employer]/      Need additional resources? Call 211 or Find Help: https://www. findhelp.org/    Annual Exam: Goals for good health were addressed at today's visit:  -Reach and stay at a healthy weight. This can lower your risk of obesity, diabetes, heart disease and high blood pressure. -Get at least 30 minutes of physical activity daily. Walking, running, swimming, cycling or playing sports are good options.  -Do not smoke or allow others to smoke around you. -Use birth control if you do not want to have children at this time and barrier contraception to prevent or decrease risk of exposure to sexually transmitted diseases. -Use sunscreen daily, SPF of 15 or higher are best.  -See a dentist at least once yearly for checkups. -Have vision checked every 1-2 years.  -Wear a seat belt in the car.  -Avoid drinking in excess of 2 alcoholic drinks per day for men and 1 drink a day for women, or avoid drinking altogether.   -Watch closely for changes in our health and contact your doctor with any concerning problems or symptoms  -Age appropriate screening tests were updated and discussed at today's visit. -Immunization history was reviewed and updated at today's visit.

## 2023-02-14 NOTE — PROGRESS NOTES
[facility]      2023    Patient Name: Sarah Tobin  :  1970    Subjective:    Chief Complaint:  Chief Complaint   Patient presents with    Annual Exam     Pt is here for CPX and to review labs. HPI Here for CPX visit; patient had fasting labs done recently and results were reviewed in detail today;   C/o rash that comes and goes on her forearms; she has darkly pigmented lesion on her forehead; denies itching;   Mammogram: 10/2022  Pap smear: 2022  Bone density:  Colonoscopy: 2019, due for repeat   Eye exam:   Dental exam: plans to schedule  Pneumovax:   Prevnar 13: Shingrix:   Tdap:   Fluvax:  HTN:  Patient compliant with taking blood pressure medications: Yes  Discussed importance of following low sodium DASH diet, increasing physical activity, taking medications as ordered, decreasing alcohol intake, keeping f/u visits to recheck blood pressure, monitoring blood pressure at home and keeping a log, with goal blood pressure <140/90. Home bp readings are: better at home  Diabetes:  Blood sugar readings: does not monitor  Patient compliant with low carbohydrate diet, with occasional dietary indiscretions. Patient is sedentary and does not exercise. Regular exercise recommended.   Patient advised on foot care and regular foot exam.  Last eye exam:   Last HgbA1c:   Lab Results   Component Value Date    LABA1C 6.4 (H) 2023     Lab Results   Component Value Date     2023          Past Medical History:   Diagnosis Date    Anxiety     Arthritis     Bilateral carpal tunnel syndrome 2020    Chronic obstructive pulmonary disease (HCC)     Chronic pain     Contact dermatitis and other eczema, due to unspecified cause     Diabetes ([address]     Fibroids     History of mammogram 10/2022    History of Papanicolaou smear of cervix 2022    Hypercholesterolemia     Hypertension     Trauma        Past Surgical History:   Procedure Laterality Date    CARPAL TUNNEL RELEASE Bilateral 2021     SECTION      x2    COLONOSCOPY N/A 9/3/2019    COLONOSCOPY /BMI 28 performed by Shaylee Guidry MD at [facility] 5 Right     MYOMECTOMY      ORTHOPEDIC SURGERY Left     Broke ankle has screws in place    OTHER SURGICAL HISTORY      Cervical spine C-6-C&7       Family History   Problem Relation Age of Onset    Other Father     Diabetes Father     Hypertension Father     Hypertension Mother     Cancer Paternal Grandmother     Osteoarthritis Mother     Heart Attack Paternal Grandmother     Hypertension Brother     Hypertension Sister     Other Sister         fibroids    Thyroid Disease Sister     Diabetes Paternal Grandmother         colon       Social History     Tobacco Use    Smoking status: Former    Smokeless tobacco: Never   Vaping Use    Vaping Use: Never used   Substance Use Topics    Alcohol use: Yes    Drug use: No                 Current Outpatient Medications:     meloxicam (MOBIC) 15 MG tablet, , Disp: , Rfl:     terbinafine (LAMISIL) 250 MG tablet, TAKE 1 TABLET BY MOUTH EVERY DAY FOR 90 DAYS, Disp: , Rfl:     clindamycin (CLEOCIN-T) 1 % external solution, Apply topically 2 times daily. , Disp: 60 mL, Rfl: 1    pregabalin (LYRICA) 75 MG capsule, Take 1 capsule by mouth 3 times daily for 180 days. Max Daily Amount: 225 mg, Disp: 90 capsule, Rfl: 5    fluconazole (DIFLUCAN) 150 MG tablet, Take 1 tablet now and repeat dose in 4 days, Disp: 2 tablet, Rfl: 1    montelukast (SINGULAIR) 10 MG tablet, Take 1 tablet by mouth daily, Disp: 60 tablet, Rfl: 5    Multiple Vitamins-Minerals (MULTIVITAMIN ADULT EXTRA C PO), Take 1 tablet by mouth, Disp: , Rfl:     blood glucose test strips (ASCENSIA AUTODISC VI;ONE TOUCH ULTRA TEST VI) strip, 1 each by In Vitro route daily As needed. , Disp: 100 each, Rfl: 3    levothyroxine (SYNTHROID) 25 MCG tablet, Take 1 tablet by mouth every morning (before breakfast), Disp: 90 tablet, Rfl: 0    metFORMIN (GLUCOPHAGE) 500 MG tablet, Take 1 tablet by mouth 2 times daily (with meals) TAKE 1 TABLET BY MOUTH TWICE A DAY, Disp: 180 tablet, Rfl: 0    atenolol (TENORMIN) 25 MG tablet, Take 1 tablet by mouth daily, Disp: 90 tablet, Rfl: 0    HYDROcodone-acetaminophen (NORCO) 5-325 MG per tablet, hydrocodone 5 mg-acetaminophen 325 mg tablet, Disp: , Rfl:     metroNIDAZOLE (METROGEL) 0.75 % vaginal gel, Use nightly in the vagina for 5 nights, Disp: 70 g, Rfl: 5    Lancets MISC, Check fasting BS daily. Dx: T2DM, Disp: , Rfl:     ascorbic acid (VITAMIN C) 500 MG tablet, Take by mouth as needed , Disp: , Rfl:     aspirin 81 MG EC tablet, Take by mouth daily prn, Disp: , Rfl:     Ergocalciferol 50 MCG (2000 UT) TABS, Take 1 capsule by mouth daily, Disp: , Rfl:     Estradiol (VAGIFEM) 10 MCG TABS vaginal tablet, Place 10 mcg vaginally Twice a Week, Disp: , Rfl:     oxybutynin (DITROPAN) 5 MG tablet, Take 5 mg by mouth 3 times daily, Disp: , Rfl:     pantoprazole (PROTONIX) 40 MG tablet, Take 40 mg by mouth daily, Disp: , Rfl:     simvastatin (ZOCOR) 20 MG tablet, Take 20 mg by mouth, Disp: , Rfl:     Allergies   Allergen Reactions    Latex Other (See Comments)     Other reaction(s): Other (see comments)      Other Swelling     Insect    Macadamia Nut Oil Hives     Brazil Nuts    Penicillin G Nausea Only    Lyrica [Pregabalin] Palpitations       Review of Systems   Constitutional:  Negative for activity change, appetite change, chills, diaphoresis, fatigue, fever and unexpected weight change.   HENT:  Negative for congestion, dental problem, drooling, ear discharge, ear pain, facial swelling, hearing loss, mouth sores, nosebleeds, postnasal drip, rhinorrhea, sinus pressure, sinus pain, sneezing, sore throat, tinnitus, trouble swallowing and voice change.    Eyes: Negative.  Negative for visual disturbance.   Respiratory: Negative.  Negative for cough, chest tightness and shortness of breath.   Cardiovascular: Negative. Negative for chest pain and palpitations. Gastrointestinal: Negative. Negative for abdominal pain, blood in stool, diarrhea, nausea and vomiting. Endocrine: Negative. Genitourinary: Negative. Negative for dysuria, frequency and urgency. Musculoskeletal: Negative. Negative for arthralgias and myalgias. Skin:  Positive for rash. Allergic/Immunologic: Negative. Neurological: Negative. Negative for numbness and headaches. Hematological: Negative. Psychiatric/Behavioral: Negative. Negative for dysphoric mood and sleep disturbance. The patient is not nervous/anxious. All other systems reviewed and are negative. Objective:  /68   Pulse 71   Temp 98 °F (36.7 °C) (Temporal)   Resp 16   Ht 5' 6\" (1.676 m)   Wt 193 lb 12.8 oz (87.9 kg)   SpO2 99%   BMI 31.28 kg/m²     Examination:  Physical Exam  Constitutional:       Appearance: Normal appearance. HENT:      Head: Normocephalic and atraumatic. Right Ear: Tympanic membrane and ear canal normal.      Left Ear: Tympanic membrane and ear canal normal.      Nose: Nose normal.      Mouth/Throat:      Mouth: Mucous membranes are moist.   Eyes:      Extraocular Movements: Extraocular movements intact. Conjunctiva/sclera: Conjunctivae normal.      Pupils: Pupils are equal, round, and reactive to light. Cardiovascular:      Rate and Rhythm: Normal rate and regular rhythm. Pulses: Normal pulses. Heart sounds: Normal heart sounds. Pulmonary:      Effort: Pulmonary effort is normal.      Breath sounds: Normal breath sounds. Abdominal:      General: Abdomen is flat. Bowel sounds are normal.      Palpations: Abdomen is soft. Musculoskeletal:      Cervical back: Normal range of motion and neck supple. Skin:     General: Skin is warm and dry. Findings: Rash present. Neurological:      General: No focal deficit present. Mental Status: She is alert. Mental status is at baseline. Psychiatric:         Mood and Affect: Mood normal.         Thought Content: Thought content normal.     Diabetic foot exam:   Left Foot:   Visual Exam: normal   Pulse DP: 2+ (normal)   Filament test: normal sensation     Right Foot:   Visual Exam: normal   Pulse DP: 2+ (normal)   Filament test: normal sensation         Assessment/Plan:    Houston Lua was seen today for annual exam.    Diagnoses and all orders for this visit:    Encounter for annual health examination  Annual Exam: Goals for good health were addressed at today's visit:  -Reach and stay at a healthy weight. This can lower your risk of obesity, diabetes, heart disease and high blood pressure. -Get at least 30 minutes of physical activity daily. Walking, running, swimming, cycling or playing sports are good options.  -Do not smoke or allow others to smoke around you. -Use birth control if you do not want to have children at this time and barrier contraception to prevent or decrease risk of exposure to sexually transmitted diseases. -Use sunscreen daily, SPF of 15 or higher are best.  -See a dentist at least once yearly for checkups. -Have vision checked every 1-2 years.  -Wear a seat belt in the car.  -Avoid drinking in excess of 2 alcoholic drinks per day for men and 1 drink a day for women, or avoid drinking altogether.   -Watch closely for changes in our health and contact your doctor with any concerning problems or symptoms  -Age appropriate screening tests were updated and discussed at today's visit. -Immunization history was reviewed and updated at today's visit. Mixed hyperlipidemia  Stable, continue medication, diet. -     Comprehensive Metabolic Panel; Future  -     CBC with Auto Differential; Future  -     Lipid Panel; Future    Type 2 diabetes mellitus with hyperglycemia, without long-term current use of insulin (HCC)  Recommended low carbohydrate diet;  Recommended yearly eye exam, dental exam, daily foot exam; discussed increased risk of retinopathy, nephropathy, neuropathy, and  cardiovascular events if diabetes is not well controlled; Goal: take medications as prescribed, monitor blood sugars daily and call with readings. -     Microalbumin / Creatinine Urine Ratio; Future  -     Hemoglobin A1C; Future    Thyroid disease  Stable on present medications, continue as prescribed. -     TSH; Future  -     T4, Free; Future    Essential hypertension  Recommended low sodium diet; Goal: take meds as prescribed, monitor blood pressure at home and call with readings. -     Urinalysis; Future    Encounter for screening for HIV  -     HIV 1/2 Ag/Ab, 4TH Generation,W Rflx Confirm; Future    Encounter for hepatitis C screening test for low risk patient  -     Hepatitis C Antibody; Future    Change in facial mole  -     [facility]    Rash and nonspecific skin eruption  Instructed to take medications as prescribed, and to call if no improvement in symptoms. -     clindamycin (CLEOCIN-T) 1 % external solution; Apply topically 2 times daily. Follow-up and Dispositions    Return in about 6 months (around 8/14/2023), or if symptoms worsen or fail to improve, for f/u w/ labs. Medication Reconciliation:  Current Medications Verified: Current medications/ immunizations were reviewed, including purpose, with patient. Family History, Social History, Current and Past Medical History was reviewed with patient and updated at today's office visit. Medication Reconciliation list was given to patient/ family. Patient was advised to discard old medication lists and provide all providers with current list at each visit and carry list with them in case of emergency.     Completed By:   Letty Orozco MD    [facility]  [address] [place] [address],  Rula Dawkins, [address]  [phone]  [phone] fax  3:08 PM

## 2023-03-16 DIAGNOSIS — E03.9 ACQUIRED HYPOTHYROIDISM: ICD-10-CM

## 2023-03-16 DIAGNOSIS — E78.2 MIXED HYPERLIPIDEMIA: ICD-10-CM

## 2023-03-16 DIAGNOSIS — I10 ESSENTIAL HYPERTENSION: ICD-10-CM

## 2023-03-16 RX ORDER — PANTOPRAZOLE SODIUM 40 MG/1
TABLET, DELAYED RELEASE ORAL
Qty: 30 TABLET | Refills: 11 | OUTPATIENT
Start: 2023-03-16

## 2023-03-16 RX ORDER — LEVOTHYROXINE SODIUM 0.03 MG/1
TABLET ORAL
Qty: 30 TABLET | OUTPATIENT
Start: 2023-03-16

## 2023-03-16 RX ORDER — ATENOLOL 25 MG/1
TABLET ORAL
Qty: 30 TABLET | OUTPATIENT
Start: 2023-03-16

## 2023-03-16 RX ORDER — SIMVASTATIN 20 MG
TABLET ORAL
Qty: 30 TABLET | Refills: 11 | OUTPATIENT
Start: 2023-03-16

## 2023-03-21 ENCOUNTER — TELEMEDICINE (OUTPATIENT)
Dept: INTERNAL MEDICINE CLINIC | Facility: CLINIC | Age: 53
End: 2023-03-21
Payer: MEDICAID

## 2023-03-21 DIAGNOSIS — J20.9 ACUTE BRONCHITIS, UNSPECIFIED ORGANISM: Primary | ICD-10-CM

## 2023-03-21 PROCEDURE — 99213 OFFICE O/P EST LOW 20 MIN: CPT | Performed by: NURSE PRACTITIONER

## 2023-03-21 RX ORDER — BENZONATATE 200 MG/1
200 CAPSULE ORAL 3 TIMES DAILY PRN
Qty: 20 CAPSULE | Refills: 0 | Status: SHIPPED | OUTPATIENT
Start: 2023-03-21 | End: 2023-03-28

## 2023-03-21 RX ORDER — METHYLPREDNISOLONE 4 MG/1
TABLET ORAL
Qty: 21 TABLET | Refills: 0 | Status: SHIPPED | OUTPATIENT
Start: 2023-03-21 | End: 2023-03-27

## 2023-03-21 RX ORDER — ALBUTEROL SULFATE 90 UG/1
2 AEROSOL, METERED RESPIRATORY (INHALATION) 4 TIMES DAILY PRN
Qty: 18 G | Refills: 0 | Status: SHIPPED | OUTPATIENT
Start: 2023-03-21

## 2023-03-21 RX ORDER — AZITHROMYCIN 250 MG/1
250 TABLET, FILM COATED ORAL SEE ADMIN INSTRUCTIONS
Qty: 6 TABLET | Refills: 0 | Status: SHIPPED | OUTPATIENT
Start: 2023-03-21 | End: 2023-03-26

## 2023-03-21 ASSESSMENT — ENCOUNTER SYMPTOMS
SINUS PRESSURE: 0
DIARRHEA: 0
ABDOMINAL PAIN: 0
SHORTNESS OF BREATH: 0
VOMITING: 0
WHEEZING: 1
RHINORRHEA: 1
SINUS PAIN: 0
SORE THROAT: 0
NAUSEA: 0
COUGH: 1

## 2023-03-21 NOTE — PROGRESS NOTES
Kirt Tobin (:  1970) is a Established patient, here for evaluation of the following:    Assessment & Plan   Below is the assessment and plan developed based on review of pertinent history, physical exam, labs, studies, and medications. 1. Acute bronchitis, unspecified organism  -     albuterol sulfate HFA (VENTOLIN HFA) 108 (90 Base) MCG/ACT inhaler; Inhale 2 puffs into the lungs 4 times daily as needed for Wheezing, Disp-18 g, R-0Normal  -     methylPREDNISolone (MEDROL DOSEPACK) 4 MG tablet; Take by mouth., Disp-21 tablet, R-0Normal  -     azithromycin (ZITHROMAX) 250 MG tablet; Take 1 tablet by mouth See Admin Instructions for 5 days 500mg on day 1 followed by 250mg on days 2 - 5, Disp-6 tablet, R-0Normal  -     benzonatate (TESSALON) 200 MG capsule; Take 1 capsule by mouth 3 times daily as needed for Cough, Disp-20 capsule, R-0Normal    Take medication as prescribed. Continue Mucinex. Call if symptoms worsen or fail to improve. Return if symptoms worsen or fail to improve. Subjective   HPI  Patient seen virtually today with complaint of 5-day history of cough and congestion. Cough is productive. Associated symptoms include low-grade fever, rhinorrhea, postnasal drainage, ear \"burning\" and wheezing. She denies any shortness of breath. Oxygen saturation had home has been \"normal\". She has been taking Mucinex but denies any improvement in symptoms. Review of Systems   Constitutional:  Positive for chills and fever. HENT:  Positive for congestion, ear pain, postnasal drip and rhinorrhea. Negative for ear discharge, sinus pressure, sinus pain and sore throat. Respiratory:  Positive for cough and wheezing. Negative for shortness of breath. Cardiovascular:  Negative for chest pain. Gastrointestinal:  Negative for abdominal pain, diarrhea, nausea and vomiting. Neurological:  Positive for headaches. Negative for dizziness and light-headedness.         Objective

## 2023-04-28 ENCOUNTER — TELEPHONE (OUTPATIENT)
Dept: INTERNAL MEDICINE CLINIC | Facility: CLINIC | Age: 53
End: 2023-04-28

## 2023-04-28 ENCOUNTER — TELEMEDICINE (OUTPATIENT)
Dept: INTERNAL MEDICINE CLINIC | Facility: CLINIC | Age: 53
End: 2023-04-28
Payer: MEDICAID

## 2023-04-28 DIAGNOSIS — R42 LIGHTHEADED: Primary | ICD-10-CM

## 2023-04-28 DIAGNOSIS — R68.83 CHILLS: ICD-10-CM

## 2023-04-28 PROCEDURE — 99213 OFFICE O/P EST LOW 20 MIN: CPT | Performed by: INTERNAL MEDICINE

## 2023-04-28 RX ORDER — CLINDAMYCIN PHOSPHATE 11.9 MG/ML
SOLUTION TOPICAL
COMMUNITY
Start: 2023-04-18

## 2023-04-28 ASSESSMENT — ENCOUNTER SYMPTOMS: RESPIRATORY NEGATIVE: 1

## 2023-04-28 NOTE — TELEPHONE ENCOUNTER
Return page and confirmed patient's identity. Patient states for the past several days she has had vague nondescript symptoms including lightheadedness, left-sided headache and body aches. Does see pain management and received an injection for pain 1 week ago today but does not know the name of the medication. Denies worst headache of her life. Does have some left eye pain feeling like a foreign body sensation is in it with some redness but denies obvious trauma. 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 Denies cough, nasal congestion, chest pain, shortness of breath, rigors, nausea, vomiting, abdominal pain, sore throat, ear pain. Checked her blood sugar today with value of 113. Did at home COVID test today which was negative. With regards to lightheadedness denies room spinning but does appear to be positional.  Has had some tingling in the left upper arm but of uncertain duration. No vision changes, slurred speech or facial droop. No wheezing. Explained to patient that because her symptoms are nonspecific I am not able to come to an accurate conclusion over the phone as to what could be causing her symptoms and thus prescribe a specific treatment. Recommended evaluation in urgent care or ER especially if she develops worst headache of her life, persistent numbness, tingling, chest pain or shortness of breath. She verbalized understanding and agreement.

## 2023-04-28 NOTE — PROGRESS NOTES
[facility]      2023    Patient Name: Hussain Tobin  :  1970    Subjective:    Chief Complaint:  Chief Complaint   Patient presents with    Sinus Problem         HPI I was at home while conducting this encounter. Consent:  She and/or her healthcare decision maker is aware that this patient-initiated Telehealth encounter is a billable service, with coverage as determined by her insurance carrier. She is aware that she may receive a bill and has provided verbal consent to proceed: Yes    This virtual visit was conducted via [employer]. Pursuant to the emergency declaration under the [facility] waiver authority and the [employer] and [employer] Act, this Virtual  Visit was conducted to reduce the patient's risk of exposure to COVID-19 and provide continuity of care for an established patient. Services were provided through a video synchronous discussion virtually to substitute for in-person clinic visit. Due to this being a TeleHealth evaluation, many elements of the physical examination are unable to be assessed. Total Time: minutes: 5-10 minutes. 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

## 2023-05-03 DIAGNOSIS — E78.2 MIXED HYPERLIPIDEMIA: Primary | ICD-10-CM

## 2023-05-03 RX ORDER — SIMVASTATIN 20 MG
TABLET ORAL
Qty: 30 TABLET | Refills: 11 | OUTPATIENT
Start: 2023-05-03

## 2023-05-03 RX ORDER — PANTOPRAZOLE SODIUM 40 MG/1
TABLET, DELAYED RELEASE ORAL
Qty: 30 TABLET | Refills: 11 | OUTPATIENT
Start: 2023-05-03

## 2023-05-05 ENCOUNTER — TELEMEDICINE (OUTPATIENT)
Dept: INTERNAL MEDICINE CLINIC | Facility: CLINIC | Age: 53
End: 2023-05-05
Payer: MEDICAID

## 2023-05-05 DIAGNOSIS — E11.65 TYPE 2 DIABETES MELLITUS WITH HYPERGLYCEMIA, WITHOUT LONG-TERM CURRENT USE OF INSULIN (HCC): Primary | ICD-10-CM

## 2023-05-05 PROCEDURE — 99213 OFFICE O/P EST LOW 20 MIN: CPT | Performed by: INTERNAL MEDICINE

## 2023-05-05 PROCEDURE — 3044F HG A1C LEVEL LT 7.0%: CPT | Performed by: INTERNAL MEDICINE

## 2023-05-05 ASSESSMENT — ENCOUNTER SYMPTOMS: RESPIRATORY NEGATIVE: 1

## 2023-05-05 NOTE — PROGRESS NOTES
[facility]      2023    Patient Name: Sadie Tobin  :  1970    Subjective:    Chief Complaint:  Chief Complaint   Patient presents with    URI         HPI I was at home while conducting this encounter. Consent:  She and/or her healthcare decision maker is aware that this patient-initiated Telehealth encounter is a billable service, with coverage as determined by her insurance carrier. She is aware that she may receive a bill and has provided verbal consent to proceed: Yes    This virtual visit was conducted via [address] Pursuant to the emergency declaration under the [facility] waiver authority and the [employer] and [employer] Act, this Virtual  Visit was conducted to reduce the patient's risk of exposure to COVID-19 and provide continuity of care for an established patient. Services were provided through a video synchronous discussion virtually to substitute for in-person clinic visit. Due to this being a TeleHealth evaluation, many elements of the physical examination are unable to be assessed. Total Time: minutes: 5-10 minutes. Patient evaluated for f/u, went to Urgent Care for URI, was prescribed Doxycycline and Diflucan; she is feeling a lot better; she denies body aches, dizziness; She would like to see a different Podiatrist; she has diabetes, toe nail fungus; she was given injections at Dr. Tonja Yip office, but would like to see someone else that is closer to where she lives;      Past Medical History:   Diagnosis Date    Anxiety     Arthritis     Bilateral carpal tunnel syndrome 2020    Chronic obstructive pulmonary disease (HCC)     Chronic pain     Contact dermatitis and other eczema, due to unspecified cause     Di[address] [address]     Fibroids     History of mammogram 10/2022    History of Papanicolaou smear of cervix 2022    Hypercholesterolemia     Hypertension

## 2023-05-08 RX ORDER — PANTOPRAZOLE SODIUM 40 MG/1
40 TABLET, DELAYED RELEASE ORAL DAILY
Qty: 90 TABLET | Refills: 1 | Status: SHIPPED | OUTPATIENT
Start: 2023-05-08

## 2023-05-08 RX ORDER — SIMVASTATIN 20 MG
20 TABLET ORAL NIGHTLY
Qty: 90 TABLET | Refills: 1 | Status: SHIPPED | OUTPATIENT
Start: 2023-05-08

## 2023-05-10 ENCOUNTER — OFFICE VISIT (OUTPATIENT)
Dept: NEUROLOGY | Age: 53
End: 2023-05-10
Payer: MEDICAID

## 2023-05-10 VITALS
DIASTOLIC BLOOD PRESSURE: 82 MMHG | HEART RATE: 76 BPM | SYSTOLIC BLOOD PRESSURE: 130 MMHG | BODY MASS INDEX: 30.51 KG/M2 | WEIGHT: 189 LBS | OXYGEN SATURATION: 98 %

## 2023-05-10 DIAGNOSIS — G62.9 PERIPHERAL POLYNEUROPATHY: ICD-10-CM

## 2023-05-10 DIAGNOSIS — G25.0 ESSENTIAL TREMOR: Primary | ICD-10-CM

## 2023-05-10 DIAGNOSIS — M79.2 NEUROPATHIC PAIN: ICD-10-CM

## 2023-05-10 PROCEDURE — 3075F SYST BP GE 130 - 139MM HG: CPT | Performed by: PSYCHIATRY & NEUROLOGY

## 2023-05-10 PROCEDURE — 99214 OFFICE O/P EST MOD 30 MIN: CPT | Performed by: PSYCHIATRY & NEUROLOGY

## 2023-05-10 PROCEDURE — 3079F DIAST BP 80-89 MM HG: CPT | Performed by: PSYCHIATRY & NEUROLOGY

## 2023-05-10 RX ORDER — PROPRANOLOL HCL 60 MG
60 CAPSULE, EXTENDED RELEASE 24HR ORAL DAILY
Qty: 30 CAPSULE | Refills: 5 | Status: SHIPPED | OUTPATIENT
Start: 2023-05-10

## 2023-05-10 ASSESSMENT — ENCOUNTER SYMPTOMS
COUGH: 0
BACK PAIN: 1
CONSTIPATION: 0

## 2023-05-10 NOTE — PROGRESS NOTES
[address], [address] [place] [address], [address] [place] [address]  Phone: [phone] Fax [phone]  Claudio Mccarthy MD      Patient: Amelia Tobin  Provider: Claudio Mccarthy MD    CC:   Chief Complaint   Patient presents with    Follow-up     Peripheral polyneuropathy     Referring Provider:    History of Present Illness: Tam Ruiz is a 45 y.o. RH female who presents for follow-up of tremor and neuropathic pain. She is unaccompanied for today's visit. She was last seen October 2022. She presents for follow-up and continued management of chronic neuropathic pain, followed by pain management for both chronic cervical and lumbar pain with history of previous surgeries to the cervical spine. She has a length dependent peripheral neuropathy, mostly sensory, affecting the plantar nerves of the feet. There is a reported tremor of the hands which is intermittent and fluctuates during the day. No obvious objective findings have been noted. No concern for parkinsonism. Current medications include (but not limited to): Atenolol 25 mg daily  Robaxin 500 mg as needed  Meloxicam 7.5 mg daily  Pregabalin 75 mg TID  Hydrocodone-acetaminophen 5-325 mg as needed     Previous medication trials include: Gabapentin, amitriptyline Cymbalta     Patient presents today for follow-up. Overall symptoms have been relatively unchanged. She still continues to report a tremor that is described as more of a \"internal\" tremor throughout her body which is more prominent when she is resting and seems to attenuate when she is active throughout the day. She also has a syndrome of diffuse and generalized chronic pain. There has been some evidence for a length-dependent peripheral neuropathy, mainly a sensory neuropathy of the feet which likely would not explain the rest of her chronic pain unless there is a more diffuse small fiber neuropathy which does remain a possibility.   Work-up for acquired

## 2023-05-15 DIAGNOSIS — E03.9 ACQUIRED HYPOTHYROIDISM: ICD-10-CM

## 2023-05-15 RX ORDER — LEVOTHYROXINE SODIUM 0.03 MG/1
TABLET ORAL
Qty: 30 TABLET | OUTPATIENT
Start: 2023-05-15

## 2023-05-16 DIAGNOSIS — E11.65 TYPE 2 DIABETES MELLITUS WITH HYPERGLYCEMIA, WITHOUT LONG-TERM CURRENT USE OF INSULIN (HCC): ICD-10-CM

## 2023-06-18 DIAGNOSIS — R21 RASH AND OTHER NONSPECIFIC SKIN ERUPTION: ICD-10-CM

## 2023-06-19 RX ORDER — CLINDAMYCIN PHOSPHATE 11.9 MG/ML
SOLUTION TOPICAL
Qty: 60 ML | Refills: 1 | OUTPATIENT
Start: 2023-06-19

## 2023-07-13 ENCOUNTER — HOSPITAL ENCOUNTER (EMERGENCY)
Age: 53
Discharge: HOME | End: 2023-07-13
Attending: EMERGENCY MEDICINE

## 2023-07-13 VITALS
OXYGEN SATURATION: 100 % | WEIGHT: 189 LBS | TEMPERATURE: 97.7 F | DIASTOLIC BLOOD PRESSURE: 83 MMHG | RESPIRATION RATE: 16 BRPM | SYSTOLIC BLOOD PRESSURE: 152 MMHG | HEART RATE: 70 BPM | BODY MASS INDEX: 30.37 KG/M2 | HEIGHT: 66 IN

## 2023-07-13 DIAGNOSIS — M79.10 MYALGIA: Primary | ICD-10-CM

## 2023-07-13 LAB
ALBUMIN SERPL-MCNC: 4.3 G/DL (ref 3.5–5)
ALBUMIN/GLOB SERPL: 1.2 (ref 0.4–1.6)
ALP SERPL-CCNC: 58 U/L (ref 50–136)
ALT SERPL-CCNC: 25 U/L (ref 12–65)
ANION GAP SERPL CALC-SCNC: 2 MMOL/L (ref 2–11)
APPEARANCE UR: CLEAR
AST SERPL-CCNC: 11 U/L (ref 15–37)
BASOPHILS # BLD: 0.1 K/UL (ref 0–0.2)
BASOPHILS NFR BLD: 1 % (ref 0–2)
BILIRUB SERPL-MCNC: 0.3 MG/DL (ref 0.2–1.1)
BILIRUB UR QL: NEGATIVE
BUN SERPL-MCNC: 16 MG/DL (ref 6–23)
CALCIUM SERPL-MCNC: 9.3 MG/DL (ref 8.3–10.4)
CHLORIDE SERPL-SCNC: 110 MMOL/L (ref 101–110)
CO2 SERPL-SCNC: 30 MMOL/L (ref 21–32)
COLOR UR: NORMAL
CREAT SERPL-MCNC: 0.94 MG/DL (ref 0.6–1)
DIFFERENTIAL METHOD BLD: NORMAL
EOSINOPHIL # BLD: 0.2 K/UL (ref 0–0.8)
EOSINOPHIL NFR BLD: 3 % (ref 0.5–7.8)
ERYTHROCYTE [DISTWIDTH] IN BLOOD BY AUTOMATED COUNT: 13.2 % (ref 11.9–14.6)
GLOBULIN SER CALC-MCNC: 3.6 G/DL (ref 2.8–4.5)
GLUCOSE SERPL-MCNC: 93 MG/DL (ref 65–100)
GLUCOSE UR STRIP.AUTO-MCNC: NEGATIVE MG/DL
HCT VFR BLD AUTO: 40.7 % (ref 35.8–46.3)
HGB BLD-MCNC: 12.9 G/DL (ref 11.7–15.4)
HGB UR QL STRIP: NEGATIVE
IMM GRANULOCYTES # BLD AUTO: 0 K/UL (ref 0–0.5)
IMM GRANULOCYTES NFR BLD AUTO: 0 % (ref 0–5)
KETONES UR QL STRIP.AUTO: NEGATIVE MG/DL
LEUKOCYTE ESTERASE UR QL STRIP.AUTO: NEGATIVE
LYMPHOCYTES # BLD: 2.9 K/UL (ref 0.5–4.6)
LYMPHOCYTES NFR BLD: 36 % (ref 13–44)
MAGNESIUM SERPL-MCNC: 2.3 MG/DL (ref 1.8–2.4)
MCH RBC QN AUTO: 30.7 PG (ref 26.1–32.9)
MCHC RBC AUTO-ENTMCNC: 31.7 G/DL (ref 31.4–35)
MCV RBC AUTO: 96.9 FL (ref 82–102)
MONOCYTES # BLD: 0.6 K/UL (ref 0.1–1.3)
MONOCYTES NFR BLD: 7 % (ref 4–12)
NEUTS SEG # BLD: 4.3 K/UL (ref 1.7–8.2)
NEUTS SEG NFR BLD: 54 % (ref 43–78)
NITRITE UR QL STRIP.AUTO: NEGATIVE
NRBC # BLD: 0 K/UL (ref 0–0.2)
PH UR STRIP: 5.5 (ref 5–9)
PLATELET # BLD AUTO: 290 K/UL (ref 150–450)
PMV BLD AUTO: 10 FL (ref 9.4–12.3)
POTASSIUM SERPL-SCNC: 3.8 MMOL/L (ref 3.5–5.1)
PROT SERPL-MCNC: 7.9 G/DL (ref 6.3–8.2)
PROT UR STRIP-MCNC: NEGATIVE MG/DL
RBC # BLD AUTO: 4.2 M/UL (ref 4.05–5.2)
SODIUM SERPL-SCNC: 142 MMOL/L (ref 133–143)
SP GR UR REFRACTOMETRY: 1.02 (ref 1–1.02)
UROBILINOGEN UR QL STRIP.AUTO: 0.2 EU/DL (ref 0.2–1)
WBC # BLD AUTO: 8 K/UL (ref 4.3–11.1)

## 2023-07-13 PROCEDURE — 83735 ASSAY OF MAGNESIUM: CPT

## 2023-07-13 PROCEDURE — 85025 COMPLETE CBC W/AUTO DIFF WBC: CPT

## 2023-07-13 PROCEDURE — 81003 URINALYSIS AUTO W/O SCOPE: CPT

## 2023-07-13 PROCEDURE — 80053 COMPREHEN METABOLIC PANEL: CPT

## 2023-07-13 PROCEDURE — 99283 EMERGENCY DEPT VISIT LOW MDM: CPT

## 2023-07-13 RX ORDER — ACETAMINOPHEN 500 MG
1000 TABLET ORAL
Status: DISCONTINUED | OUTPATIENT
Start: 2023-07-13 | End: 2023-07-13 | Stop reason: HOSPADM

## 2023-07-13 ASSESSMENT — ENCOUNTER SYMPTOMS
VOMITING: 0
NAUSEA: 0
COLOR CHANGE: 0
SHORTNESS OF BREATH: 0
ABDOMINAL PAIN: 0
DIARRHEA: 0

## 2023-07-13 ASSESSMENT — LIFESTYLE VARIABLES
HOW OFTEN DO YOU HAVE A DRINK CONTAINING ALCOHOL: MONTHLY OR LESS
HOW MANY STANDARD DRINKS CONTAINING ALCOHOL DO YOU HAVE ON A TYPICAL DAY: 1 OR 2

## 2023-07-13 NOTE — ED PROVIDER NOTES
oxybutynin (DITROPAN) 5 MG tablet Take 1 tablet by mouth 3 times dailyHistorical Med              Results for orders placed or performed during the hospital encounter of 07/13/23   Urinalysis w rflx microscopic   Result Value Ref Range    Color, UA YELLOW/STRAW      Appearance CLEAR      Specific Gravity, UA 1.023 1.001 - 1.023      pH, Urine 5.5 5.0 - 9.0      Protein, UA Negative NEG mg/dL    Glucose, UA Negative mg/dL    Ketones, Urine Negative NEG mg/dL    Bilirubin Urine Negative NEG      Blood, Urine Negative NEG      Urobilinogen, Urine 0.2 0.2 - 1.0 EU/dL    Nitrite, Urine Negative NEG      Leukocyte Esterase, Urine Negative NEG     CBC with Auto Differential   Result Value Ref Range    WBC 8.0 4.3 - 11.1 K/uL    RBC 4.20 4.05 - 5.2 M/uL    Hemoglobin 12.9 11.7 - 15.4 g/dL    Hematocrit 40.7 35.8 - 46.3 %    MCV 96.9 82.0 - 102.0 FL    MCH 30.7 26.1 - 32.9 PG    MCHC 31.7 31.4 - 35.0 g/dL    RDW 13.2 11.9 - 14.6 %    Platelets 918 359 - 013 K/uL    MPV 10.0 9.4 - 12.3 FL    nRBC 0.00 0.0 - 0.2 K/uL    Differential Type AUTOMATED      Neutrophils % 54 43 - 78 %    Lymphocytes % 36 13 - 44 %    Monocytes % 7 4.0 - 12.0 %    Eosinophils % 3 0.5 - 7.8 %    Basophils % 1 0.0 - 2.0 %    Immature Granulocytes 0 0.0 - 5.0 %    Neutrophils Absolute 4.3 1.7 - 8.2 K/UL    Lymphocytes Absolute 2.9 0.5 - 4.6 K/UL    Monocytes Absolute 0.6 0.1 - 1.3 K/UL    Eosinophils Absolute 0.2 0.0 - 0.8 K/UL    Basophils Absolute 0.1 0.0 - 0.2 K/UL    Absolute Immature Granulocyte 0.0 0.0 - 0.5 K/UL   Comprehensive Metabolic Panel   Result Value Ref Range    Sodium 142 133 - 143 mmol/L    Potassium 3.8 3.5 - 5.1 mmol/L    Chloride 110 101 - 110 mmol/L    CO2 30 21 - 32 mmol/L    Anion Gap 2 2 - 11 mmol/L    Glucose 93 65 - 100 mg/dL    BUN 16 6 - 23 MG/DL    Creatinine 0.94 0.6 - 1.0 MG/DL    Est, Glom Filt Rate >60 >60 ml/min/1.73m2    Calcium 9.3 8.3 - 10.4 MG/DL    Total Bilirubin 0.3 0.2 - 1.1 MG/DL    ALT 25 12 - 65 U/L    AST

## 2023-07-13 NOTE — DISCHARGE INSTRUCTIONS
You were evaluated in the emergency department today for generalized body aches for over 1 month.   Lab work today is very reassuring    Physical exam today is also very reassuring    No indication for imaging today    Contact your primary care provider tomorrow to schedule an ER follow-up within the next week or 2 return to the emergency department for chest pain, shortness of breath, signs and symptoms of stroke as listed in your discharge paperwork

## 2024-04-23 ENCOUNTER — TELEPHONE (OUTPATIENT)
Dept: INTERNAL MEDICINE CLINIC | Facility: CLINIC | Age: 54
End: 2024-04-23

## 2024-04-23 NOTE — TELEPHONE ENCOUNTER
Please get mammo report done at [facility] for my review, as I do not see results in patient's chart;

## 2024-05-17 ENCOUNTER — TELEPHONE (OUTPATIENT)
Dept: INTERNAL MEDICINE CLINIC | Facility: CLINIC | Age: 54
End: 2024-05-17

## 2024-05-17 NOTE — TELEPHONE ENCOUNTER
Talked to pt and informed her, per Dr. Wilcox, mammogram stable, recheck in 1 year; call sooner if occurrence of breast pain, masses or other changes.  Pt voiced understanding.

## 2024-05-17 NOTE — TELEPHONE ENCOUNTER
Mammo stable, recheck in 1 year; call sooner if occurrence of breast pain, masses or other changes;   Study done at [facility];

## (undated) DEVICE — KENDALL RADIOLUCENT FOAM MONITORING ELECTRODE RECTANGULAR SHAPE: Brand: KENDALL

## (undated) DEVICE — CONNECTOR TBNG OD5-7MM O2 END DISP

## (undated) DEVICE — CANNULA NSL ORAL AD FOR CAPNOFLEX CO2 O2 AIRLFE

## (undated) DEVICE — SYR 5ML 1/5 GRAD LL NSAF LF --

## (undated) DEVICE — SYR 3ML LL TIP 1/10ML GRAD --

## (undated) DEVICE — NDL PRT INJ NSAF BLNT 18GX1.5 --